# Patient Record
Sex: FEMALE | Race: WHITE | NOT HISPANIC OR LATINO | Employment: OTHER | URBAN - METROPOLITAN AREA
[De-identification: names, ages, dates, MRNs, and addresses within clinical notes are randomized per-mention and may not be internally consistent; named-entity substitution may affect disease eponyms.]

---

## 2023-06-04 DIAGNOSIS — M51.34 OTHER INTERVERTEBRAL DISC DEGENERATION, THORACIC REGION: ICD-10-CM

## 2023-06-06 RX ORDER — PREGABALIN 50 MG/1
CAPSULE ORAL
Qty: 60 CAPSULE | Refills: 0 | Status: SHIPPED | OUTPATIENT
Start: 2023-06-06 | End: 2023-07-10

## 2023-07-09 DIAGNOSIS — M51.34 OTHER INTERVERTEBRAL DISC DEGENERATION, THORACIC REGION: ICD-10-CM

## 2023-07-10 RX ORDER — PREGABALIN 50 MG/1
CAPSULE ORAL
Qty: 60 CAPSULE | Refills: 0 | Status: SHIPPED | OUTPATIENT
Start: 2023-07-10 | End: 2023-08-22 | Stop reason: ALTCHOICE

## 2023-08-14 ENCOUNTER — TELEPHONE (OUTPATIENT)
Dept: PRIMARY CARE | Facility: CLINIC | Age: 88
End: 2023-08-14

## 2023-08-14 DIAGNOSIS — M51.34 OTHER INTERVERTEBRAL DISC DEGENERATION, THORACIC REGION: ICD-10-CM

## 2023-08-15 RX ORDER — PREGABALIN 50 MG/1
CAPSULE ORAL
Qty: 60 CAPSULE | Refills: 0 | OUTPATIENT
Start: 2023-08-15

## 2023-08-22 ENCOUNTER — LAB (OUTPATIENT)
Dept: LAB | Facility: LAB | Age: 88
End: 2023-08-22
Payer: MEDICARE

## 2023-08-22 ENCOUNTER — OFFICE VISIT (OUTPATIENT)
Dept: PRIMARY CARE | Facility: CLINIC | Age: 88
End: 2023-08-22
Payer: MEDICARE

## 2023-08-22 VITALS
HEIGHT: 61 IN | WEIGHT: 107.4 LBS | DIASTOLIC BLOOD PRESSURE: 78 MMHG | TEMPERATURE: 96.7 F | BODY MASS INDEX: 20.28 KG/M2 | SYSTOLIC BLOOD PRESSURE: 150 MMHG

## 2023-08-22 DIAGNOSIS — E78.5 DYSLIPIDEMIA: ICD-10-CM

## 2023-08-22 DIAGNOSIS — H90.0 CONDUCTIVE HEARING LOSS, BILATERAL: ICD-10-CM

## 2023-08-22 DIAGNOSIS — I10 BENIGN ESSENTIAL HYPERTENSION: ICD-10-CM

## 2023-08-22 DIAGNOSIS — G20.A1 PARKINSONS DISEASE (MULTI): ICD-10-CM

## 2023-08-22 DIAGNOSIS — E11.51 TYPE 2 DIABETES MELLITUS WITH DIABETIC PERIPHERAL ANGIOPATHY WITHOUT GANGRENE, WITHOUT LONG-TERM CURRENT USE OF INSULIN (MULTI): ICD-10-CM

## 2023-08-22 DIAGNOSIS — E46 PROTEIN-CALORIE MALNUTRITION, UNSPECIFIED SEVERITY (MULTI): ICD-10-CM

## 2023-08-22 DIAGNOSIS — M46.07: Primary | ICD-10-CM

## 2023-08-22 DIAGNOSIS — F33.0 MAJOR DEPRESSIVE DISORDER, RECURRENT, MILD (CMS-HCC): ICD-10-CM

## 2023-08-22 DIAGNOSIS — Z00.00 MEDICARE ANNUAL WELLNESS VISIT, SUBSEQUENT: ICD-10-CM

## 2023-08-22 PROBLEM — M54.6 THORACIC SPINE PAIN: Status: ACTIVE | Noted: 2023-08-22

## 2023-08-22 PROBLEM — K21.9 GASTROESOPHAGEAL REFLUX DISEASE: Status: ACTIVE | Noted: 2023-08-22

## 2023-08-22 PROBLEM — M15.9 GENERALIZED OSTEOARTHRITIS OF MULTIPLE SITES: Status: ACTIVE | Noted: 2023-08-22

## 2023-08-22 PROBLEM — M51.34 DEGENERATIVE DISC DISEASE, THORACIC: Status: ACTIVE | Noted: 2023-08-22

## 2023-08-22 PROBLEM — E11.9 DIABETES MELLITUS (MULTI): Status: ACTIVE | Noted: 2023-08-22

## 2023-08-22 PROBLEM — M54.9 CHRONIC BACK PAIN: Status: ACTIVE | Noted: 2023-08-22

## 2023-08-22 PROBLEM — Z96.651 STATUS POST RIGHT KNEE REPLACEMENT: Status: ACTIVE | Noted: 2023-08-22

## 2023-08-22 PROBLEM — H52.10 MYOPIA: Status: ACTIVE | Noted: 2023-08-22

## 2023-08-22 PROBLEM — M54.16 LUMBAR RADICULOPATHY: Status: ACTIVE | Noted: 2023-08-22

## 2023-08-22 PROBLEM — M17.10 OSTEOARTHRITIS, LOWER LEG, LOCALIZED: Status: ACTIVE | Noted: 2023-08-22

## 2023-08-22 PROBLEM — M81.0 POST-MENOPAUSAL OSTEOPOROSIS: Status: ACTIVE | Noted: 2023-08-22

## 2023-08-22 PROBLEM — M62.830 LUMBAR PARASPINAL MUSCLE SPASM: Status: ACTIVE | Noted: 2023-08-22

## 2023-08-22 PROBLEM — H40.1190 CHRONIC OPEN ANGLE GLAUCOMA: Status: ACTIVE | Noted: 2023-08-22

## 2023-08-22 PROBLEM — H90.3 BILATERAL SENSORINEURAL HEARING LOSS: Status: ACTIVE | Noted: 2023-08-22

## 2023-08-22 PROBLEM — G89.29 CHRONIC BACK PAIN: Status: ACTIVE | Noted: 2023-08-22

## 2023-08-22 PROBLEM — K22.70 BARRETT'S ESOPHAGUS WITHOUT DYSPLASIA: Status: ACTIVE | Noted: 2023-08-22

## 2023-08-22 PROBLEM — M62.81 MUSCLE WEAKNESS: Status: ACTIVE | Noted: 2023-08-22

## 2023-08-22 PROBLEM — H52.209 ASTIGMATISM: Status: ACTIVE | Noted: 2023-08-22

## 2023-08-22 PROBLEM — E11.9 DIABETES MELLITUS (MULTI): Status: RESOLVED | Noted: 2023-08-22 | Resolved: 2023-08-22

## 2023-08-22 PROBLEM — E03.9 HYPOTHYROID: Status: ACTIVE | Noted: 2023-08-22

## 2023-08-22 LAB
ERYTHROCYTE DISTRIBUTION WIDTH (RATIO) BY AUTOMATED COUNT: 12 % (ref 11.5–14.5)
ERYTHROCYTE MEAN CORPUSCULAR HEMOGLOBIN CONCENTRATION (G/DL) BY AUTOMATED: 31.6 G/DL (ref 32–36)
ERYTHROCYTE MEAN CORPUSCULAR VOLUME (FL) BY AUTOMATED COUNT: 97 FL (ref 80–100)
ERYTHROCYTES (10*6/UL) IN BLOOD BY AUTOMATED COUNT: 4.62 X10E12/L (ref 4–5.2)
HEMATOCRIT (%) IN BLOOD BY AUTOMATED COUNT: 44.6 % (ref 36–46)
HEMOGLOBIN (G/DL) IN BLOOD: 14.1 G/DL (ref 12–16)
LEUKOCYTES (10*3/UL) IN BLOOD BY AUTOMATED COUNT: 6 X10E9/L (ref 4.4–11.3)
NRBC (PER 100 WBCS) BY AUTOMATED COUNT: 0 /100 WBC (ref 0–0)
PLATELETS (10*3/UL) IN BLOOD AUTOMATED COUNT: 316 X10E9/L (ref 150–450)

## 2023-08-22 PROCEDURE — 99213 OFFICE O/P EST LOW 20 MIN: CPT | Performed by: INTERNAL MEDICINE

## 2023-08-22 PROCEDURE — 1159F MED LIST DOCD IN RCRD: CPT | Performed by: INTERNAL MEDICINE

## 2023-08-22 PROCEDURE — 1170F FXNL STATUS ASSESSED: CPT | Performed by: INTERNAL MEDICINE

## 2023-08-22 PROCEDURE — 80053 COMPREHEN METABOLIC PANEL: CPT

## 2023-08-22 PROCEDURE — 84443 ASSAY THYROID STIM HORMONE: CPT

## 2023-08-22 PROCEDURE — 1125F AMNT PAIN NOTED PAIN PRSNT: CPT | Performed by: INTERNAL MEDICINE

## 2023-08-22 PROCEDURE — 36415 COLL VENOUS BLD VENIPUNCTURE: CPT

## 2023-08-22 PROCEDURE — 85027 COMPLETE CBC AUTOMATED: CPT

## 2023-08-22 PROCEDURE — 1036F TOBACCO NON-USER: CPT | Performed by: INTERNAL MEDICINE

## 2023-08-22 PROCEDURE — 1160F RVW MEDS BY RX/DR IN RCRD: CPT | Performed by: INTERNAL MEDICINE

## 2023-08-22 PROCEDURE — G0439 PPPS, SUBSEQ VISIT: HCPCS | Performed by: INTERNAL MEDICINE

## 2023-08-22 PROCEDURE — 3077F SYST BP >= 140 MM HG: CPT | Performed by: INTERNAL MEDICINE

## 2023-08-22 PROCEDURE — 3078F DIAST BP <80 MM HG: CPT | Performed by: INTERNAL MEDICINE

## 2023-08-22 RX ORDER — PREGABALIN 150 MG/1
1 CAPSULE ORAL 2 TIMES DAILY
COMMUNITY
Start: 2018-03-07 | End: 2023-08-22 | Stop reason: ALTCHOICE

## 2023-08-22 RX ORDER — OMEPRAZOLE 20 MG/1
1 TABLET, DELAYED RELEASE ORAL DAILY
COMMUNITY
End: 2023-12-01 | Stop reason: WASHOUT

## 2023-08-22 RX ORDER — BRIMONIDINE TARTRATE 2 MG/ML
SOLUTION/ DROPS OPHTHALMIC
COMMUNITY
End: 2023-10-21 | Stop reason: SDUPTHER

## 2023-08-22 RX ORDER — TRIAMTERENE AND HYDROCHLOROTHIAZIDE 37.5; 25 MG/1; MG/1
CAPSULE ORAL
COMMUNITY
End: 2023-08-22 | Stop reason: ALTCHOICE

## 2023-08-22 RX ORDER — VALSARTAN 80 MG/1
80 TABLET ORAL DAILY
Qty: 30 TABLET | Refills: 5 | Status: SHIPPED | OUTPATIENT
Start: 2023-08-22 | End: 2023-09-18

## 2023-08-22 RX ORDER — CARBIDOPA AND LEVODOPA 25; 100 MG/1; MG/1
1 TABLET ORAL 3 TIMES DAILY
COMMUNITY

## 2023-08-22 RX ORDER — LEVOTHYROXINE SODIUM 75 UG/1
TABLET ORAL
COMMUNITY
End: 2023-10-18 | Stop reason: SDUPTHER

## 2023-08-22 RX ORDER — DULOXETIN HYDROCHLORIDE 30 MG/1
1 CAPSULE, DELAYED RELEASE ORAL DAILY
COMMUNITY
Start: 2022-12-27 | End: 2023-08-29 | Stop reason: SDUPTHER

## 2023-08-22 RX ORDER — ATORVASTATIN CALCIUM 40 MG/1
TABLET, FILM COATED ORAL
COMMUNITY
End: 2023-08-22 | Stop reason: ALTCHOICE

## 2023-08-22 RX ORDER — ROSUVASTATIN CALCIUM 40 MG/1
TABLET, COATED ORAL
COMMUNITY
Start: 2021-07-30 | End: 2023-12-01 | Stop reason: WASHOUT

## 2023-08-22 RX ORDER — CHOLECALCIFEROL (VITAMIN D3) 125 MCG
CAPSULE ORAL
COMMUNITY
End: 2023-12-01 | Stop reason: WASHOUT

## 2023-08-22 RX ORDER — DULOXETIN HYDROCHLORIDE 60 MG/1
60 CAPSULE, DELAYED RELEASE ORAL DAILY
COMMUNITY
End: 2023-08-22 | Stop reason: ALTCHOICE

## 2023-08-22 RX ORDER — PREGABALIN 50 MG/1
1 CAPSULE ORAL 2 TIMES DAILY
COMMUNITY
Start: 2022-12-27 | End: 2023-12-11 | Stop reason: ALTCHOICE

## 2023-08-22 RX ORDER — MAGNESIUM HYDROXIDE 400 MG/5ML
SUSPENSION, ORAL (FINAL DOSE FORM) ORAL
COMMUNITY
End: 2023-12-01 | Stop reason: WASHOUT

## 2023-08-22 RX ORDER — GABAPENTIN 100 MG/1
CAPSULE ORAL
COMMUNITY
Start: 2015-11-06 | End: 2023-08-22 | Stop reason: ALTCHOICE

## 2023-08-22 RX ORDER — TRIAMTERENE/HYDROCHLOROTHIAZID 37.5-25 MG
1 TABLET ORAL DAILY
COMMUNITY
End: 2023-08-22 | Stop reason: ALTCHOICE

## 2023-08-22 RX ORDER — HYDROCHLOROTHIAZIDE 25 MG/1
25 TABLET ORAL DAILY
Qty: 30 TABLET | Refills: 5 | Status: SHIPPED | OUTPATIENT
Start: 2023-08-22 | End: 2023-09-18

## 2023-08-22 ASSESSMENT — ENCOUNTER SYMPTOMS
LOSS OF SENSATION IN FEET: 0
SHORTNESS OF BREATH: 0
CHILLS: 0
BLOOD IN STOOL: 0
LIGHT-HEADEDNESS: 0
WEAKNESS: 0
ARTHRALGIAS: 1
FLANK PAIN: 0
NERVOUS/ANXIOUS: 0
APPETITE CHANGE: 0
ABDOMINAL PAIN: 0
DYSURIA: 0
HEADACHES: 0
DECREASED CONCENTRATION: 0
PALPITATIONS: 0
WHEEZING: 0
ACTIVITY CHANGE: 0
DIZZINESS: 0
BACK PAIN: 1
FREQUENCY: 0
SLEEP DISTURBANCE: 0
COUGH: 0
DEPRESSION: 0
UNEXPECTED WEIGHT CHANGE: 0
DIFFICULTY URINATING: 0
CHEST TIGHTNESS: 0
SORE THROAT: 0
OCCASIONAL FEELINGS OF UNSTEADINESS: 0
NECK PAIN: 0

## 2023-08-22 ASSESSMENT — ACTIVITIES OF DAILY LIVING (ADL)
GROCERY_SHOPPING: INDEPENDENT
BATHING: INDEPENDENT
DOING_HOUSEWORK: INDEPENDENT
TAKING_MEDICATION: INDEPENDENT
MANAGING_FINANCES: INDEPENDENT
DRESSING: INDEPENDENT

## 2023-08-22 ASSESSMENT — PATIENT HEALTH QUESTIONNAIRE - PHQ9
SUM OF ALL RESPONSES TO PHQ9 QUESTIONS 1 AND 2: 0
1. LITTLE INTEREST OR PLEASURE IN DOING THINGS: NOT AT ALL
2. FEELING DOWN, DEPRESSED OR HOPELESS: NOT AT ALL

## 2023-08-22 NOTE — PROGRESS NOTES
"Subjective   Patient ID: Lewis Rose is a 88 y.o. female.    HPI patient seen for Medicare wellness visit and follow-up.  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  She is currently seeing pain management and taking tramadol, Lyrica, duloxetine for pain.  She denies she would like to wean off of Lyrica as it is not helping with the pain.  She lives alone in independent living.  She does not use any assistive devices.  She denies any injuries or falls.  Review of Systems   Constitutional:  Negative for activity change, appetite change, chills and unexpected weight change.   HENT:  Negative for congestion, postnasal drip and sore throat.    Eyes:  Negative for visual disturbance.   Respiratory:  Negative for cough, chest tightness, shortness of breath and wheezing.    Cardiovascular:  Negative for chest pain, palpitations and leg swelling.   Gastrointestinal:  Negative for abdominal pain and blood in stool.   Endocrine: Negative for cold intolerance and heat intolerance.   Genitourinary:  Negative for difficulty urinating, dysuria, flank pain and frequency.   Musculoskeletal:  Positive for arthralgias and back pain. Negative for gait problem and neck pain.   Skin:  Negative for rash.   Allergic/Immunologic: Negative for environmental allergies and food allergies.   Neurological:  Negative for dizziness, weakness, light-headedness and headaches.   Psychiatric/Behavioral:  Negative for decreased concentration and sleep disturbance. The patient is not nervous/anxious.        Objective   Visit Vitals  /78 (BP Location: Right arm, Patient Position: Sitting)   Temp 35.9 °C (96.7 °F) (Temporal)   Ht 1.552 m (5' 1.1\")   Wt 48.7 kg (107 lb 6.4 oz)   BMI 20.23 kg/m²   Smoking Status Never   BSA 1.45 m²      Physical Exam  Vitals reviewed.   Constitutional:       General: She is not in acute distress.     Appearance: Normal " appearance.   HENT:      Head: Normocephalic and atraumatic.      Mouth/Throat:      Mouth: Mucous membranes are moist.   Cardiovascular:      Rate and Rhythm: Normal rate and regular rhythm.      Pulses: Normal pulses.   Pulmonary:      Effort: Pulmonary effort is normal. No respiratory distress.      Breath sounds: Normal breath sounds.   Abdominal:      General: Bowel sounds are normal. There is no distension.      Tenderness: There is no abdominal tenderness.   Musculoskeletal:         General: No swelling or tenderness. Normal range of motion.      Cervical back: Normal range of motion.   Skin:     General: Skin is warm.   Neurological:      General: No focal deficit present.      Mental Status: She is alert.      Coordination: Coordination normal.      Gait: Gait normal.   Psychiatric:         Mood and Affect: Mood normal.         Behavior: Behavior normal.         Assessment/Plan   Diagnoses and all orders for this visit:  Spinal enthesopathy, lumbosacral region (CMS/HCC)  Comments:  Chronic low back pain  Continue with tramadol as prescribed by pain management  Continue with duloxetine  Major depressive disorder, recurrent, mild (CMS/HCC)  Comments:  Stable with Cymbalta  Type 2 diabetes mellitus with diabetic peripheral angiopathy without gangrene, without long-term current use of insulin (CMS/[facility])  Comments:  Stable without medications  Benign essential hypertension  Comments:  Blood pressure is elevated  Discontinue triamterene/HCTZ  Start HCTZ and valsartan daily  Orders:  -     CBC; Future  -     Comprehensive Metabolic Panel; Future  -     TSH with reflex to Free T4 if abnormal; Future  -     hydroCHLOROthiazide (HYDRODiuril) 25 mg tablet; Take 1 tablet (25 mg) by mouth once daily.  -     valsartan (Diovan) 80 mg tablet; Take 1 tablet (80 mg) by mouth once daily.  Dyslipidemia  Comments:  Restart rosuvastatin 20 mg once a day  Conductive hearing loss, bilateral  Comments:  Referral to audiology  provided  Orders:  -     Referral to Audiology; Future  Medicare annual wellness visit, subsequent  Comments:  Wellness visit completed  No care gaps found  Parkinsons disease (CMS/[facility])  Comments:  Recently seen by neurology  Protein-calorie malnutrition, unspecified severity (CMS/[facility])  Comments:  Monitor BMI  Follow-up in 2 months for blood pressure check

## 2023-08-22 NOTE — PATIENT INSTRUCTIONS
Wean lyrica off slowly  Blood pressure is high today  STOP taking triamterene/HCTZ  Start hydrochlorothiazide and valsartan 1 tablet each daily  Follow-up in 2 months for blood pressure check

## 2023-08-23 LAB
ALANINE AMINOTRANSFERASE (SGPT) (U/L) IN SER/PLAS: 9 U/L (ref 7–45)
ALBUMIN (G/DL) IN SER/PLAS: 4.4 G/DL (ref 3.4–5)
ALKALINE PHOSPHATASE (U/L) IN SER/PLAS: 95 U/L (ref 33–136)
ANION GAP IN SER/PLAS: 14 MMOL/L (ref 10–20)
ASPARTATE AMINOTRANSFERASE (SGOT) (U/L) IN SER/PLAS: 16 U/L (ref 9–39)
BILIRUBIN TOTAL (MG/DL) IN SER/PLAS: 0.5 MG/DL (ref 0–1.2)
CALCIUM (MG/DL) IN SER/PLAS: 10 MG/DL (ref 8.6–10.6)
CARBON DIOXIDE, TOTAL (MMOL/L) IN SER/PLAS: 28 MMOL/L (ref 21–32)
CHLORIDE (MMOL/L) IN SER/PLAS: 97 MMOL/L (ref 98–107)
CREATININE (MG/DL) IN SER/PLAS: 0.73 MG/DL (ref 0.5–1.05)
GFR FEMALE: 80 ML/MIN/1.73M2
GLUCOSE (MG/DL) IN SER/PLAS: 150 MG/DL (ref 74–99)
POTASSIUM (MMOL/L) IN SER/PLAS: 4.2 MMOL/L (ref 3.5–5.3)
PROTEIN TOTAL: 7.1 G/DL (ref 6.4–8.2)
SODIUM (MMOL/L) IN SER/PLAS: 135 MMOL/L (ref 136–145)
THYROTROPIN (MIU/L) IN SER/PLAS BY DETECTION LIMIT <= 0.05 MIU/L: 1.35 MIU/L (ref 0.44–3.98)
UREA NITROGEN (MG/DL) IN SER/PLAS: 18 MG/DL (ref 6–23)

## 2023-08-29 DIAGNOSIS — F33.0 MAJOR DEPRESSIVE DISORDER, RECURRENT, MILD (CMS-HCC): Primary | ICD-10-CM

## 2023-08-29 RX ORDER — DULOXETIN HYDROCHLORIDE 30 MG/1
30 CAPSULE, DELAYED RELEASE ORAL DAILY
Qty: 90 CAPSULE | Refills: 1 | Status: SHIPPED | OUTPATIENT
Start: 2023-08-29 | End: 2023-10-26 | Stop reason: ALTCHOICE

## 2023-09-07 LAB
6-ACETYLMORPHINE: <25 NG/ML
7-AMINOCLONAZEPAM: <25 NG/ML
ALPHA-HYDROXYALPRAZOLAM: <25 NG/ML
ALPHA-HYDROXYMIDAZOLAM: <25 NG/ML
ALPRAZOLAM: <25 NG/ML
AMPHETAMINE (PRESENCE) IN URINE BY SCREEN METHOD: ABNORMAL
BARBITURATES PRESENCE IN URINE BY SCREEN METHOD: ABNORMAL
CANNABINOIDS IN URINE BY SCREEN METHOD: ABNORMAL
CHLORDIAZEPOXIDE: <25 NG/ML
CLONAZEPAM: <25 NG/ML
COCAINE (PRESENCE) IN URINE BY SCREEN METHOD: ABNORMAL
CODEINE: <50 NG/ML
CREATINE, URINE FOR DRUG: 143.3 MG/DL
DIAZEPAM: <25 NG/ML
DRUG SCREEN COMMENT URINE: ABNORMAL
EDDP: <25 NG/ML
FENTANYL CONFIRMATION, URINE: <2.5 NG/ML
HYDROCODONE: <25 NG/ML
HYDROMORPHONE: <25 NG/ML
LORAZEPAM: <25 NG/ML
METHADONE CONFIRMATION,URINE: <25 NG/ML
MIDAZOLAM: <25 NG/ML
MORPHINE URINE: <50 NG/ML
NORDIAZEPAM: <25 NG/ML
NORFENTANYL: <2.5 NG/ML
NORHYDROCODONE: <25 NG/ML
NOROXYCODONE: <25 NG/ML
O-DESMETHYLTRAMADOL: >1000 NG/ML
OXAZEPAM: <25 NG/ML
OXYCODONE: <25 NG/ML
OXYMORPHONE: <25 NG/ML
PHENCYCLIDINE (PRESENCE) IN URINE BY SCREEN METHOD: ABNORMAL
TEMAZEPAM: <25 NG/ML
TRAMADOL: >1000 NG/ML
ZOLPIDEM METABOLITE (ZCA): <25 NG/ML
ZOLPIDEM: <25 NG/ML

## 2023-09-17 DIAGNOSIS — I10 BENIGN ESSENTIAL HYPERTENSION: ICD-10-CM

## 2023-09-18 RX ORDER — VALSARTAN 80 MG/1
80 TABLET ORAL DAILY
Qty: 30 TABLET | Refills: 5 | Status: SHIPPED | OUTPATIENT
Start: 2023-09-18 | End: 2023-12-22

## 2023-09-18 RX ORDER — HYDROCHLOROTHIAZIDE 25 MG/1
25 TABLET ORAL DAILY
Qty: 90 TABLET | Refills: 1 | Status: SHIPPED | OUTPATIENT
Start: 2023-09-18 | End: 2023-12-22

## 2023-10-18 DIAGNOSIS — E03.8 OTHER SPECIFIED HYPOTHYROIDISM: Primary | ICD-10-CM

## 2023-10-19 PROBLEM — E55.9 VITAMIN D DEFICIENCY, UNSPECIFIED: Status: ACTIVE | Noted: 2018-05-30

## 2023-10-19 PROBLEM — F41.9 ANXIETY DISORDER, UNSPECIFIED: Status: ACTIVE | Noted: 2018-05-30

## 2023-10-19 PROBLEM — Z78.9 DIFFICULTY TAKING MEDICATION: Status: ACTIVE | Noted: 2020-11-12

## 2023-10-19 PROBLEM — Z86.010 HX OF ADENOMATOUS COLONIC POLYPS: Status: ACTIVE | Noted: 2023-10-19

## 2023-10-19 PROBLEM — Z96.652 PRESENCE OF LEFT ARTIFICIAL KNEE JOINT: Status: ACTIVE | Noted: 2018-06-28

## 2023-10-19 PROBLEM — M25.572 ARTHRALGIA OF LEFT FOOT: Status: ACTIVE | Noted: 2022-02-03

## 2023-10-19 PROBLEM — H11.442 CONJUNCTIVAL CYSTS, LEFT EYE: Status: ACTIVE | Noted: 2020-10-07

## 2023-10-19 PROBLEM — H02.883 MEIBOMIAN GLAND DYSFUNCTION (MGD) OF BOTH EYES: Status: ACTIVE | Noted: 2020-10-07

## 2023-10-19 PROBLEM — H04.123 DRY EYES: Status: ACTIVE | Noted: 2020-04-04

## 2023-10-19 PROBLEM — F41.0 PANIC DISORDER WITHOUT AGORAPHOBIA: Status: ACTIVE | Noted: 2019-05-06

## 2023-10-19 PROBLEM — R19.4 CHANGE IN BOWEL HABIT: Status: ACTIVE | Noted: 2018-11-06

## 2023-10-19 PROBLEM — E78.2 MIXED HYPERLIPIDEMIA: Status: ACTIVE | Noted: 2018-06-08

## 2023-10-19 PROBLEM — M71.9 DISORDER OF BURSAE OF SHOULDER REGION: Status: ACTIVE | Noted: 2019-05-06

## 2023-10-19 PROBLEM — G47.30 SLEEP APNEA: Status: ACTIVE | Noted: 2018-05-30

## 2023-10-19 PROBLEM — Z91.148 DIFFICULTY TAKING MEDICATION: Status: ACTIVE | Noted: 2020-11-12

## 2023-10-19 PROBLEM — M54.2 CERVICALGIA: Status: ACTIVE | Noted: 2018-05-31

## 2023-10-19 PROBLEM — R63.4 WEIGHT LOSS, UNINTENTIONAL: Status: ACTIVE | Noted: 2020-11-12

## 2023-10-19 PROBLEM — F32.2 AGITATED DEPRESSION (MULTI): Status: ACTIVE | Noted: 2020-11-05

## 2023-10-19 PROBLEM — Z86.0101 HX OF ADENOMATOUS COLONIC POLYPS: Status: ACTIVE | Noted: 2023-10-19

## 2023-10-19 PROBLEM — E11.9 TYPE 2 DIABETES MELLITUS WITHOUT COMPLICATIONS (MULTI): Status: ACTIVE | Noted: 2018-06-18

## 2023-10-19 PROBLEM — M20.42 ACQUIRED HAMMER TOE OF LEFT FOOT: Status: ACTIVE | Noted: 2022-02-03

## 2023-10-19 PROBLEM — H40.1121 PRIMARY OPEN ANGLE GLAUCOMA OF LEFT EYE, MILD STAGE: Status: ACTIVE | Noted: 2023-10-19

## 2023-10-19 PROBLEM — H02.889 MGD (MEIBOMIAN GLAND DISEASE): Status: ACTIVE | Noted: 2018-05-30

## 2023-10-19 PROBLEM — M47.812 CERVICAL SPONDYLOSIS: Status: ACTIVE | Noted: 2018-05-30

## 2023-10-19 PROBLEM — M54.30 SCIATICA: Status: ACTIVE | Noted: 2018-06-28

## 2023-10-19 PROBLEM — I25.10 CORONARY ARTERY DISEASE: Status: ACTIVE | Noted: 2018-06-28

## 2023-10-19 PROBLEM — H02.886 MEIBOMIAN GLAND DYSFUNCTION (MGD) OF BOTH EYES: Status: ACTIVE | Noted: 2020-10-07

## 2023-10-19 PROBLEM — F32.9 MAJOR DEPRESSIVE DISORDER, SINGLE EPISODE, UNSPECIFIED: Status: ACTIVE | Noted: 2018-06-28

## 2023-10-19 PROBLEM — E74.10 DIETARY FRUCTOSE INTOLERANCE: Status: ACTIVE | Noted: 2018-11-01

## 2023-10-19 PROBLEM — T84.9XXA: Status: ACTIVE | Noted: 2022-02-03

## 2023-10-19 PROBLEM — K62.5 HEMORRHAGE OF RECTUM AND ANUS: Status: ACTIVE | Noted: 2019-05-06

## 2023-10-19 PROBLEM — M17.11 UNILATERAL PRIMARY OSTEOARTHRITIS, RIGHT KNEE: Status: ACTIVE | Noted: 2018-06-18

## 2023-10-19 PROBLEM — K21.00 GASTROESOPHAGEAL REFLUX DISEASE WITH ESOPHAGITIS: Status: ACTIVE | Noted: 2018-10-19

## 2023-10-19 PROBLEM — C43.9 MALIGNANT MELANOMA OF SKIN, UNSPECIFIED (MULTI): Status: ACTIVE | Noted: 2018-06-18

## 2023-10-19 PROBLEM — N18.9 CHRONIC KIDNEY DISEASE: Status: ACTIVE | Noted: 2018-06-18

## 2023-10-19 RX ORDER — UREA 40 %
CREAM (GRAM) TOPICAL 2 TIMES DAILY
COMMUNITY
Start: 2020-06-04 | End: 2023-12-01 | Stop reason: WASHOUT

## 2023-10-19 RX ORDER — TRIAMTERENE/HYDROCHLOROTHIAZID 37.5-25 MG
1 TABLET ORAL DAILY
COMMUNITY
End: 2023-10-26 | Stop reason: ALTCHOICE

## 2023-10-19 RX ORDER — ASPIRIN 81 MG/1
81 TABLET ORAL DAILY
COMMUNITY
Start: 2017-05-03

## 2023-10-19 RX ORDER — LEVOTHYROXINE SODIUM 75 UG/1
TABLET ORAL
Qty: 90 TABLET | Refills: 1 | Status: SHIPPED | OUTPATIENT
Start: 2023-10-19 | End: 2024-03-07 | Stop reason: SDUPTHER

## 2023-10-19 RX ORDER — TRIAMCINOLONE ACETONIDE 1 MG/G
CREAM TOPICAL NIGHTLY
COMMUNITY
Start: 2023-10-02 | End: 2023-12-01 | Stop reason: WASHOUT

## 2023-10-19 RX ORDER — PREGABALIN 150 MG/1
150 CAPSULE ORAL 2 TIMES DAILY
COMMUNITY
End: 2023-12-11 | Stop reason: ALTCHOICE

## 2023-10-19 RX ORDER — TRAMADOL HYDROCHLORIDE 50 MG/1
50 TABLET ORAL 3 TIMES DAILY PRN
COMMUNITY
Start: 2023-10-11 | End: 2023-12-11 | Stop reason: SDUPTHER

## 2023-10-19 RX ORDER — TIMOLOL MALEATE 2.5 MG/ML
1 SOLUTION/ DROPS OPHTHALMIC 2 TIMES DAILY
COMMUNITY
Start: 2023-09-30 | End: 2023-10-26 | Stop reason: ALTCHOICE

## 2023-10-19 RX ORDER — METFORMIN HYDROCHLORIDE 500 MG/1
500 TABLET ORAL
COMMUNITY
Start: 2020-12-06 | End: 2023-10-26 | Stop reason: ALTCHOICE

## 2023-10-19 RX ORDER — CEPHALEXIN 500 MG/1
500 CAPSULE ORAL EVERY 6 HOURS
COMMUNITY
End: 2023-10-26 | Stop reason: ALTCHOICE

## 2023-10-19 RX ORDER — LANCETS
EACH MISCELLANEOUS DAILY
COMMUNITY
End: 2023-12-01 | Stop reason: WASHOUT

## 2023-10-19 RX ORDER — AMMONIUM LACTATE 12 G/100G
CREAM TOPICAL AS NEEDED
COMMUNITY

## 2023-10-20 ENCOUNTER — CLINICAL SUPPORT (OUTPATIENT)
Dept: AUDIOLOGY | Facility: CLINIC | Age: 88
End: 2023-10-20
Payer: MEDICARE

## 2023-10-20 DIAGNOSIS — H90.3 SENSORINEURAL HEARING LOSS, BILATERAL: Primary | ICD-10-CM

## 2023-10-20 PROCEDURE — 92557 COMPREHENSIVE HEARING TEST: CPT

## 2023-10-20 PROCEDURE — 92550 TYMPANOMETRY & REFLEX THRESH: CPT

## 2023-10-20 NOTE — PROGRESS NOTES
ADULT AUDIOLOGY AUDIOMETRIC EVALUATION    Name:  Lewis Rose  :  1935  Age:  88 y.o.  Date of Evaluation:  10/20/2023    HISTORY  Lewis Sarmiento, age 88 years old, was seen today for a hearing assessment and hearing aid check. She arrives today reporting her hearing aids will not stay charged for more than 5 hours.     RECALL  At her appointment on 8/3/2021, she reported that the left hearing aid would not charge. She was encouraged to put the left device in the right device's  slot when she got home and see if it will charge. Today, she reported that she completed this troubleshooting step at home but the left device still would not charge. She elected to have the left device sent in for an in-warranty repair. A listening check on the right device revealed that it was in good working condition. The wax trap was changed on the right hearing aid. The patient's  and right hearing aid were returned to her. Once the left device is back from repair, we will call the patient for her to pick it up at the front window.    Right ear: Audéo M70-RSN: [id number]  Left ear: Audéo M70-RSN: [id number]  M receivers Size 1 with medium vented domes.   Warranty expires 2022      AUDIOLOGIC EVALUATION    OTOSCOPY  Otoscopic inspection revealed clear canals and visualization of the eardrum bilaterally.    IMMITTANCE  Normal tympanograms were obtained bilaterally, consistent with a normal moving eardrums and the likely absence of fluid.    Ipsilateral acoustic reflexes were tested and absent at 500Hz, 1000Hz, 2000Hz, and 4000Hz bilaterally.    AUDIOMETRIC TESTING  Pure tone audiometry conducted via insert headphones from 125 Hz - 8000 Hz with good reliability was consistent with:  Right ear: Mild sloping to moderately severe sensorineural hearing loss   Left ear: normal sloping to moderately-severe sensorineural hearing loss    *Stable since last hearing assessment     SPEECH RECOGNITION TESTING (SRT)  SRT was in  agreement with pure tone averages bilaterally ( Right: 45  dB HL, Left: 55 dB HL).    WORD RECOGNITION SCORE (WRS)  WRS was (96%) in the right ear and (100%) in the left ear using recorded ordered by difficulty NU6 word list.    IMPRESSIONS:  The results of today's assessment after consistent with normal tympanograms, absent acoustic reflexes, and stable hearing bilaterally. The patient was counseled with regard to the findings. She would like to purchase new [employer] hearing aids at a level 50. An order has been started in the blonde color.     RECOMMENDATIONS:  Treatment Plan:.  Follow up with ENT/PCP as recommended.  Annual hearing assessments as recommended. Follow up sooner if patient feels hearing or symptoms have changed.  Contact the clinic with questions or concerns at [phone].     PATIENT EDUCATION:   Discussed results and recommendations with patient.  Questions were addressed and the patient was encouraged to contact our department should concerns arise.      Saul Rojas, CCC-A, [facility]  Licensed Audiologist

## 2023-10-21 DIAGNOSIS — H40.1121 PRIMARY OPEN ANGLE GLAUCOMA OF LEFT EYE, MILD STAGE: ICD-10-CM

## 2023-10-21 DIAGNOSIS — H40.1112 PRIMARY OPEN ANGLE GLAUCOMA OF RIGHT EYE, MODERATE STAGE: ICD-10-CM

## 2023-10-21 DIAGNOSIS — H40.1112 PRIMARY OPEN ANGLE GLAUCOMA OF RIGHT EYE, MODERATE STAGE: Primary | ICD-10-CM

## 2023-10-21 RX ORDER — BRIMONIDINE TARTRATE 2 MG/ML
1 SOLUTION/ DROPS OPHTHALMIC 2 TIMES DAILY
Qty: 30 ML | Refills: 0 | Status: SHIPPED | OUTPATIENT
Start: 2023-10-21 | End: 2024-03-13 | Stop reason: SDUPTHER

## 2023-10-21 RX ORDER — BRIMONIDINE TARTRATE 2 MG/ML
SOLUTION/ DROPS OPHTHALMIC
Qty: 20 ML | Refills: 2 | OUTPATIENT
Start: 2023-10-21

## 2023-10-26 ENCOUNTER — OFFICE VISIT (OUTPATIENT)
Dept: PRIMARY CARE | Facility: CLINIC | Age: 88
End: 2023-10-26
Payer: MEDICARE

## 2023-10-26 VITALS
BODY MASS INDEX: 19.47 KG/M2 | WEIGHT: 103.4 LBS | HEART RATE: 82 BPM | DIASTOLIC BLOOD PRESSURE: 88 MMHG | SYSTOLIC BLOOD PRESSURE: 147 MMHG

## 2023-10-26 DIAGNOSIS — F01.B0 VASCULAR DEMENTIA, MODERATE, WITHOUT BEHAVIORAL DISTURBANCE, PSYCHOTIC DISTURBANCE, MOOD DISTURBANCE, AND ANXIETY (MULTI): Primary | ICD-10-CM

## 2023-10-26 DIAGNOSIS — E78.5 DYSLIPIDEMIA: ICD-10-CM

## 2023-10-26 DIAGNOSIS — E11.51 TYPE 2 DIABETES MELLITUS WITH DIABETIC PERIPHERAL ANGIOPATHY WITHOUT GANGRENE, WITHOUT LONG-TERM CURRENT USE OF INSULIN (MULTI): ICD-10-CM

## 2023-10-26 DIAGNOSIS — M54.40 CHRONIC LOW BACK PAIN WITH SCIATICA, SCIATICA LATERALITY UNSPECIFIED, UNSPECIFIED BACK PAIN LATERALITY: ICD-10-CM

## 2023-10-26 DIAGNOSIS — I10 BENIGN ESSENTIAL HYPERTENSION: ICD-10-CM

## 2023-10-26 DIAGNOSIS — C43.9 MALIGNANT MELANOMA OF SKIN, UNSPECIFIED (MULTI): ICD-10-CM

## 2023-10-26 DIAGNOSIS — G89.29 CHRONIC LOW BACK PAIN WITH SCIATICA, SCIATICA LATERALITY UNSPECIFIED, UNSPECIFIED BACK PAIN LATERALITY: ICD-10-CM

## 2023-10-26 PROCEDURE — 99213 OFFICE O/P EST LOW 20 MIN: CPT | Performed by: INTERNAL MEDICINE

## 2023-10-26 PROCEDURE — 1159F MED LIST DOCD IN RCRD: CPT | Performed by: INTERNAL MEDICINE

## 2023-10-26 PROCEDURE — 3077F SYST BP >= 140 MM HG: CPT | Performed by: INTERNAL MEDICINE

## 2023-10-26 PROCEDURE — 3079F DIAST BP 80-89 MM HG: CPT | Performed by: INTERNAL MEDICINE

## 2023-10-26 PROCEDURE — 1160F RVW MEDS BY RX/DR IN RCRD: CPT | Performed by: INTERNAL MEDICINE

## 2023-10-26 PROCEDURE — 1036F TOBACCO NON-USER: CPT | Performed by: INTERNAL MEDICINE

## 2023-10-26 PROCEDURE — 1126F AMNT PAIN NOTED NONE PRSNT: CPT | Performed by: INTERNAL MEDICINE

## 2023-10-26 ASSESSMENT — ENCOUNTER SYMPTOMS
BACK PAIN: 1
WEAKNESS: 1
FATIGUE: 0
SHORTNESS OF BREATH: 0
CONSTIPATION: 0
CHILLS: 0
ABDOMINAL PAIN: 0
NECK PAIN: 0
FEVER: 0
BLOOD IN STOOL: 0
HEADACHES: 0
MYALGIAS: 0
PALPITATIONS: 0
DIARRHEA: 0
ARTHRALGIAS: 1
COUGH: 0
DIZZINESS: 0

## 2023-10-26 ASSESSMENT — PAIN SCALES - GENERAL: PAINLEVEL: 0-NO PAIN

## 2023-10-26 NOTE — PROGRESS NOTES
Subjective   Patient ID: Lewis Rose is a 88 y.o. female.    HPI patient is seen today for routine follow-up.  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  She is currently seeing pain management and is taking tramadol.  She exercises regularly-walks her dog 3-4 times a day.  She denies any health concerns.  No falls or injury.    Review of Systems   Constitutional:  Negative for chills, fatigue and fever.   Respiratory:  Negative for cough and shortness of breath.    Cardiovascular:  Negative for chest pain, palpitations and leg swelling.   Gastrointestinal:  Negative for abdominal pain, blood in stool, constipation and diarrhea.   Endocrine: Negative for cold intolerance and heat intolerance.   Musculoskeletal:  Positive for arthralgias and back pain. Negative for myalgias and neck pain.   Neurological:  Positive for weakness. Negative for dizziness and headaches.       Objective   Physical Exam  Vitals reviewed.   Constitutional:       General: She is not in acute distress.     Appearance: Normal appearance.   HENT:      Head: Normocephalic and atraumatic.   Cardiovascular:      Rate and Rhythm: Normal rate and regular rhythm.      Pulses: Normal pulses.   Pulmonary:      Effort: Pulmonary effort is normal. No respiratory distress.      Breath sounds: Normal breath sounds.   Abdominal:      General: Bowel sounds are normal. There is no distension.      Tenderness: There is no abdominal tenderness.   Musculoskeletal:         General: No swelling or tenderness. Normal range of motion.      Cervical back: Normal range of motion.   Skin:     General: Skin is warm.   Neurological:      General: No focal deficit present.      Mental Status: She is alert.      Coordination: Coordination normal.      Gait: Gait normal.   Psychiatric:         Mood and Affect: Mood normal.         Behavior: Behavior normal.         Assessment/Plan    Diagnoses and all orders for this visit:  Vascular dementia, moderate, without behavioral disturbance, psychotic disturbance, mood disturbance, and anxiety (CMS/[facility])  Comments:  Stable  Malignant melanoma of skin, unspecified (CMS/[facility])  Comments:  Followed by dermatology  Type 2 diabetes mellitus with diabetic peripheral angiopathy without gangrene, without long-term current use of insulin (CMS/[facility])  Comments:  Stable without medication  Benign essential hypertension  Comments:  Stable  Continue valsartan and hydrochlorothiazide daily  Dyslipidemia  Comments:  Continue with rosuvastatin  Chronic low back pain with sciatica, sciatica laterality unspecified, unspecified back pain laterality  Comments:  Follow-up with pain management  Continue with tramadol as prescribed    Influenza vaccine, pneumonia vaccine and COVID-vaccine-up-to-date  Follow-up in 6  months

## 2023-12-01 ENCOUNTER — APPOINTMENT (OUTPATIENT)
Dept: GERIATRIC MEDICINE | Facility: CLINIC | Age: 88
End: 2023-12-01
Payer: MEDICARE

## 2023-12-01 ENCOUNTER — CLINICAL SUPPORT (OUTPATIENT)
Dept: GERIATRIC MEDICINE | Facility: CLINIC | Age: 88
End: 2023-12-01
Payer: MEDICARE

## 2023-12-01 ENCOUNTER — OFFICE VISIT (OUTPATIENT)
Dept: GERIATRIC MEDICINE | Facility: CLINIC | Age: 88
End: 2023-12-01
Payer: MEDICARE

## 2023-12-01 VITALS
WEIGHT: 104.2 LBS | DIASTOLIC BLOOD PRESSURE: 90 MMHG | SYSTOLIC BLOOD PRESSURE: 170 MMHG | BODY MASS INDEX: 19.62 KG/M2 | HEART RATE: 74 BPM

## 2023-12-01 DIAGNOSIS — I10 PRIMARY HYPERTENSION: Primary | ICD-10-CM

## 2023-12-01 DIAGNOSIS — Z63.6 CAREGIVER BURDEN: ICD-10-CM

## 2023-12-01 DIAGNOSIS — G20.A2 PARKINSON'S DISEASE WITH FLUCTUATING MANIFESTATIONS, UNSPECIFIED WHETHER DYSKINESIA PRESENT (MULTI): ICD-10-CM

## 2023-12-01 DIAGNOSIS — F01.B0 VASCULAR DEMENTIA, MODERATE, WITHOUT BEHAVIORAL DISTURBANCE, PSYCHOTIC DISTURBANCE, MOOD DISTURBANCE, AND ANXIETY (MULTI): ICD-10-CM

## 2023-12-01 PROCEDURE — 1126F AMNT PAIN NOTED NONE PRSNT: CPT | Performed by: NURSE PRACTITIONER

## 2023-12-01 PROCEDURE — 1170F FXNL STATUS ASSESSED: CPT | Performed by: NURSE PRACTITIONER

## 2023-12-01 PROCEDURE — 99205 OFFICE O/P NEW HI 60 MIN: CPT | Performed by: NURSE PRACTITIONER

## 2023-12-01 PROCEDURE — 1159F MED LIST DOCD IN RCRD: CPT | Performed by: NURSE PRACTITIONER

## 2023-12-01 PROCEDURE — G2212 PROLONG OUTPT/OFFICE VIS: HCPCS | Performed by: NURSE PRACTITIONER

## 2023-12-01 PROCEDURE — 3080F DIAST BP >= 90 MM HG: CPT | Performed by: NURSE PRACTITIONER

## 2023-12-01 PROCEDURE — 1160F RVW MEDS BY RX/DR IN RCRD: CPT | Performed by: NURSE PRACTITIONER

## 2023-12-01 PROCEDURE — 1036F TOBACCO NON-USER: CPT | Performed by: NURSE PRACTITIONER

## 2023-12-01 PROCEDURE — 99215 OFFICE O/P EST HI 40 MIN: CPT | Mod: PO | Performed by: NURSE PRACTITIONER

## 2023-12-01 PROCEDURE — 3077F SYST BP >= 140 MM HG: CPT | Performed by: NURSE PRACTITIONER

## 2023-12-01 RX ORDER — ACETAMINOPHEN 500 MG
500 TABLET ORAL EVERY 6 HOURS PRN
COMMUNITY

## 2023-12-01 RX ORDER — DULOXETIN HYDROCHLORIDE 30 MG/1
30 CAPSULE, DELAYED RELEASE ORAL DAILY
COMMUNITY
End: 2023-12-22 | Stop reason: SDUPTHER

## 2023-12-01 RX ORDER — OMEPRAZOLE 20 MG/1
20 TABLET, DELAYED RELEASE ORAL
COMMUNITY

## 2023-12-01 SDOH — SOCIAL STABILITY - SOCIAL INSECURITY: DEPENDENT RELATIVE NEEDING CARE AT HOME: Z63.6

## 2023-12-01 ASSESSMENT — ACTIVITIES OF DAILY LIVING (ADL)
DOING_HOUSEWORK: NEEDS ASSISTANCE
NEEDS_ASSISTANCE_WITH_FOOD: INDEPENDENT
HEARING - LEFT EAR: HEARING AID
FEEDING YOURSELF: INDEPENDENT
PILL_BOX_USED: YES
STILL_DRIVING: YES
TAKING_MEDICATION: NEEDS ASSISTANCE
USING_TRANSPORTATION: INDEPENDENT
PREPARING_MEALS: INDEPENDENT
PATIENT'S MEMORY ADEQUATE TO SAFELY COMPLETE DAILY ACTIVITIES?: YES
TOILETING: INDEPENDENT
USING_TELEPHONE: INDEPENDENT
HEARING - RIGHT EAR: HEARING AID
MANAGING_FINANCES: NEEDS ASSISTANCE
ADEQUATE_TO_COMPLETE_ADL: YES
EATING: INDEPENDENT
GROOMING: INDEPENDENT
GROCERY_SHOPPING: INDEPENDENT
JUDGMENT_ADEQUATE_SAFELY_COMPLETE_DAILY_ACTIVITIES: YES
BATHING: INDEPENDENT
DRESSING YOURSELF: INDEPENDENT
WALKS IN HOME: INDEPENDENT

## 2023-12-01 ASSESSMENT — GERIATRIC DEPRESSION SCALE SHORT VERSION (GDS-SV)
ARE YOU AFRAID THAT SOMETHING BAD IS GOING TO HAPPEN TO YOU: NO
DO YOU OFTEN GET BORED: YES
HAVE YOU DROPPED MANY OF YOUR ACTIVITIES AND INTERESTS?: YES
DO YOU PREFER TO STAY AT HOME, RATHER THAN GOING OUT AND DOING NEW THINGS: NO
DO YOU FEEL THAT YOUR LIFE IS EMPTY: NO
DO YOU FEEL FULL OF ENERGY: NO
DO YOU THINK IT IS WONDERFUL TO BE ALIVE NOW: YES
ARE YOU IN GOOD SPIRITS MOST OF THE TIME: YES
DO YOU FEEL PRETTY WORTHLESS THE WAY YOU ARE NOW: NO
ARE YOU BASICALLY SATISFIED WITH YOUR LIFE: YES
DO YOU THINK THAT MOST PEOPLE ARE BETTER OFF THAN YOU ARE: NO
GDS TOTAL SCORE: 4
DO YOU FEEL HAPPY MOST OF THE TIME: YES
DO YOU FEEL THAT YOUR SITUATION IS HOPELESS: NO
DO YOU OFTEN FEEL HELPLESS: YES
DO YOU FEEL YOU HAVE MORE PROBLEMS WITH MEMORY THAN MOST: NO

## 2023-12-01 ASSESSMENT — MONTREAL COGNITIVE ASSESSMENT (MOCA)
9. REPEAT EACH SENTENCE: 1
4. NAME EACH OF THE THREE ANIMALS SHOWN: 2
5. MEMORY TRIALS: 0
12. MEMORY INDEX SCORE: 3
10. [FLUENCY] NAME WORDS STARTING WITH DESIGNATED LETTER: 0
13. ORIENTATION SUBSCORE: 5
VISUOSPATIAL/EXECUTIVE SUBSCORE: 1
7. [VIGILENCE] TAP WHEN HEARING DESIGNATED LETTER: 1
6. READ LIST OF DIGITS [FORWARD/BACKWARD]: 1
WHAT IS THE TOTAL SCORE (OUT OF 30): 15
8. SERIAL SUBTRACTION OF 7S: 1
11. FOR EACH PAIR OF WORDS, WHAT CATEGORY DO THEY BELONG TO (OUT OF 2): 0
WHAT LEVEL OF EDUCATION WAS ATTAINED: 0

## 2023-12-01 ASSESSMENT — ENCOUNTER SYMPTOMS
BRUISES/BLEEDS EASILY: 1
DEPRESSION: 1
FREQUENCY: 1
DYSPHORIC MOOD: 1
OCCASIONAL FEELINGS OF UNSTEADINESS: 0
MYALGIAS: 1
SLEEP DISTURBANCE: 1
WEAKNESS: 1
APPETITE CHANGE: 1
ARTHRALGIAS: 1
NERVOUS/ANXIOUS: 1
LOSS OF SENSATION IN FEET: 0
TROUBLE SWALLOWING: 1

## 2023-12-01 ASSESSMENT — PAIN SCALES - GENERAL: PAINLEVEL: 0-NO PAIN

## 2023-12-01 NOTE — PROGRESS NOTES
Subjective   Patient ID: Lewis Rose is a 88 y.o. female who presents for .    Identifying Information                              : 1935           Assessment date: 2023    Objective       Social History                           Social History     Socioeconomic History    Marital status:      Spouse name: None    Number of children: None    Years of education: None    Highest education level: None   Occupational History    None   Tobacco Use    Smoking status: Never    Smokeless tobacco: Never   Substance and Sexual Activity    Alcohol use: Not Currently    Drug use: Never    Sexual activity: None   Other Topics Concern    None   Social History Narrative    None     Social Determinants of Health     Financial Resource Strain: Not on file   Food Insecurity: Not on file   Transportation Needs: Not on file   Physical Activity: Not on file   Stress: Not on file   Social Connections: Not on file   Intimate Partner Violence: Not on file   Housing Stability: Not on file        ENCOUNTER SCREENING RESULTS  MOCA Total Score: 15  Geriatric Depression Scale (Short Version) Total: 4    NURSING ASSESSMENT    ADL Screening  Patient's Vision Adequate to Safely Complete Daily Activities: Yes  Patient's Judgment Adequate to Safely Complete Daily Activities: Yes  Patient's Memory Adequate to Safely Complete Daily Activities: Yes  Patient Able to Express Needs/Desires: Yes  Which is your dominant hand?: Right  Dressing: Independent  Grooming: Independent  Feeding: Independent  Bathing: Independent  Toileting: Independent  In/Out Bed: Independent  Walks in Home: Independent  Weakness of Legs: None (hx if bilater knee replacement)  Weakness of Arms/Hands: Right  Hearing - Right Ear: Hearing aid  Hearing - Left Ear: Hearing aid     IADL's  Using Telephone: Independent  Grocery Shopping: Independent  Preparing Meals: Independent  Doing Housework: Needs assistance  Laundry: Independent  Taking Medication: Needs  assistance  Pill Box Used: Yes (but doesn't always use)  Managing Finances: Needs assistance  Using Transportation: Independent  Still Driving: Yes  Eating: Independent  Needs Assistance With Food: Independent (but more difficult to cut food)  Difficulty Chewing or Swallowing: No                 SOCIAL WORK ASSESSMENT    Advance Directives/Legal/Financial     DPOA for healthcare:  DPOA for finances:   Legal guardian:       Living Will:      On any form of disability?  If so, explain:     ?   If so, explain:     Significant financial stressors:     Family History      Parent or sibling with neurodegenerative diagnosis:      Extended family members with relevant health history:     Living Situation      Type of residence:  #floors:     People living in the home:     Does patient feel safe living in their home?      Supportive Relationships (Informal Support)     Spouse/partner information:     Children Information:       Other social supports:    Formal Supports     Engaged community services (Emergency Alert, HHA, MOW, Case Management, Etc.):     Mental Health     Sleep: how many hours at night , sleep aides:       Energy:      Mood:      Affect:      Notable Loss/Grief:      Self-harm/suicidal thoughts, plan:      Interests/Hobbies/Activities/Daily Routine:      History of outpatient psychiatric services:      History of inpatient psychiatric services (hospitalization):      Medications for mental health past or present:      History of addiction services:     Assessment/Plan   Impression:      Plan:

## 2023-12-01 NOTE — PATIENT INSTRUCTIONS
Thank you for allowing me to care for you today. Below is what we discussed and additional information you should be aware of before our next visit. If you have any questions, please don't hesitate to call the office.     High Blood Pressure  Omron arm blood pressure cuff  Order BP cuff through [employer]  Take BP every morning after using the bathroom and before eating and taking medications. Nurse will call a week from today for readings.    Parkinson's Disease  - Follow-up with Dr. Ken Florian, neurologist  - Discuss side effects of taking carbidopa/levodopa and your not being able to take the full dose prescribed to treat your condition.    Recommendation:  Driving evaluation for safety   - Order submitted for Occupational Therapy Driving Evaluation, order included with summary  - Not covered by Medicare  - Cost vary by facility, we suggest you call to ascertain the current fee.     Rehabilitation- Occupational therapy Locations    [facility]  [address] [place], [place] [id number] [phone]    [facility]  [address] [place], [place] [id number] [phone]    [facility]  [address] [place] [address]   [phone]    [facility]  P: [phone], F: [phone]  [address] [place] [address]  Hermann Vazquez, OTR/L, CDRS, Certified  Rehabilitation Specialist    [facility]  [phone] for appointments  Locations:  - [facility] [place] [facility]     [address] [address], [place] [address]  - [facility]     [address] [place] [address]  - [facility]      [address], [place] [address]  - [facility], [place]    [address] [place], [place] [id number] [place] [address] Urgent & Outpatient Care,  [facility]  [address] [place] [address]      General brain health guidelines:  - make sure your medical conditions are well controlled (e.g., high blood pressure, high cholesterol, diabetes, sleep apnea etc)  - do not smoke or chew tobacco  - limit alcohol use to no more than 1 alcoholic beverage per day   - address any sensory deficits (e.g., proper glasses for poor eyesight, hearing aids for hearing loss)  - use a weekly pill box  - eat a heart healthy diet (fruits, vegetables, lean meats, fatty fish, whole grains. Limit processed foods)  - exercise or walk. Gradually increase to the goal of 5 days per week, 30 minutes at a time  - try to get at least 7 hours of quality sleep per night  - keep yourself mentally active daily by reading, playing cards, doing word searches, puzzles, etc.  - challenge your brain with new cognitive tasks (new hobby, crafts, take a class, learn a language)  - stay socially active by being part of a group or organization     brain exercise apps and websites:  - [email or web address]  - [email or web address]  - [email or web address]  - [email or web address]  -[email or web address]

## 2023-12-01 NOTE — PROGRESS NOTES
Subjective   Ms. Rose 88 y.o. year old female is accompanied by: DTR, Karishma Juarez  Consult Requested By:  Patient and family  Reason for consultation or primary concern: New Patient Visit, Establish Care, and Hypertension    OARRS Report: Reviewed: The patient's OARRS report was reviewed and is consistent with the reported medication use.  Kori JOAQUIN Cuba, APRN-CNP  12/01/23 11:30 PM      Lewis Rose is a 88-yo White female who presents today to establish care with daughter, Karishma, present. On intake, pt with elevated BP, 170/90. BP taken again by this provider, 200/94. Patient reports taking medications as prescribed, Valsartan and hydrochlorothiazide. [place] lives [place] at [facility], where she has lived 1 yr this month. She was dx w/ Parkinson's Disease 2/2023, prescribed Sinemet at that time, but reports N/V when taking whole tablet. Reports taking 1/2 tablet BID with food. Patient has noticeable tremor of BUE. Witnessed shuffling gait when she went to restroom. However, she does not use any assistive device. Reports she does not regularly eat foods high in salt, but eats a liberal diet. Does not monitor BP at home. DTR completed intake forms and indicated patient with repetitive dialog, sometimes seems confused, no longer able to use iphone or ipad, now needs help paying bills, definitely having trouble with memory and word retrieval. Scored 7/8 on AD8 Dementia Screening. MoCA score was 15/30.      Hypertension  This is a chronic problem. Episode onset: Unsure. Progression since onset: Unsure. The problem is uncontrolled. Agents associated with hypertension include thyroid hormones. Risk factors for coronary artery disease include dyslipidemia, post-menopausal state and sedentary lifestyle. Past treatments include angiotensin blockers and diuretics. The current treatment provides mild improvement. Compliance problems include psychosocial issues.  Identifiable causes of hypertension include a  thyroid problem.        What matters most: Establishing care with geriatric focused provider. Chronic back pain, Parkinson's diagnosis  Health Goals:    Goals        Blood Pressure < 140/90      Record your blood pressure once per day             Assistive Devices  Visual: glasses Yes   Hearing: hearing Yes Last exam: 10/2023  Dental: Yes Last exam: 9/2023    Sleep: how many hours at night 5 hours before waking to use bathroom at 2:30 am and again at 6 am, sleep aides: no. Usually up for the day at 9 am.    Advanced Directives on file: <no information>    Review of Systems   Constitutional:  Positive for appetite change.   HENT:  Positive for trouble swallowing.    Genitourinary:  Positive for frequency.   Musculoskeletal:  Positive for arthralgias and myalgias.   Neurological:  Positive for weakness.   Hematological:  Bruises/bleeds easily.   Psychiatric/Behavioral:  Positive for dysphoric mood and sleep disturbance. The patient is nervous/anxious.        Objective   /90   Pulse 74 Comment: 97 OX  Wt 47.3 kg (104 lb 3.2 oz)   BMI 19.62 kg/m²     Physical Exam      MoCA: 15/30  GDS:  4 /15    AD8 Dementia Screening Interview (8 points): 7/8  Problems with judgement (e.g, problems making decisions, bad financial decisions, problems with thinking)  Yes, a change- Sometimes seems confused, maybe due to hearing loss  Less interest in hobbies/activities  Yes, a change - Does not participate in any activities @ [facility]  Repeats the same things over and over (questions, stories, or statements)  Yes, a change - Does repeat stories/statements  Trouble learning how to use a tool, appliance, or gadget (e.g., VCR, computer, microwave, remote control)  Yes, a change - Has trouble with using iPhone and iPad  Forgets correct month or year  No, no change  Trouble handling complicate financial affairs (e.g., balancing checkbook, income taxes, paying bills)  Yes, a change - Needs assistance with paying bills, has  taxes done by a professional  Trouble remembering appointments  Yes, a change - Often gets dates wrong or times for appointments  Daily problems with thinking and/or memory  Yes, a change - Definitely has trouble with memory and word retrieval      Shortened Seven-Item Screen for Caregiver [place] (28 points) 12/28  Did not occur=0, Occurred but did not cause distress=1, Mild distress=2, Moderate distress=3, Severe distress=4  I feel so alone - as if I have the world on my shoulders. 0  I have little control over my relative's behavior. 3  I have to do too many jobs/chores (feeding, shopping) that my relative used to do. 2  I am upset that I cannot communicate with my relative. 3  My relative is constantly asking the same questions over and over. 2  I have little control over my relative's illness. 2  I am totally responsible for keeping our household in order. 0    TSH   Date/Time Value Ref Range Status   08/22/2023 04:23 PM 1.35 0.44 - 3.98 mIU/L Final     Comment:      TSH testing is performed using different testing    methodology at [facility] than at other    [facility]. Direct result comparisons should    only be made within the same method.     Vitamin D, 25-Hydroxy   Date/Time Value Ref Range Status   07/01/2022 09:51 AM 36 ng/mL Final     Comment:     .  DEFICIENCY:         < 20   NG/ML  INSUFFICIENCY:      20-29  NG/ML  SUFFICIENCY:         NG/ML    THIS ASSAY ACCURATELY QUANTIFIES THE SUM OF  VITAMIN D3, 25-HYDROXY AND VIT D2,25-HYDROXY.         Chemistry    Lab Results   Component Value Date/Time     (L) 08/22/2023 1623    K 4.2 08/22/2023 1623    CL 97 (L) 08/22/2023 1623    CO2 28 08/22/2023 1623    BUN 18 08/22/2023 1623    CREATININE 0.73 08/22/2023 1623    Lab Results   Component Value Date/Time    CALCIUM 10.0 08/22/2023 1623    ALKPHOS 95 08/22/2023 1623    AST 16 08/22/2023 1623    ALT 9 08/22/2023 1623    BILITOT 0.5 08/22/2023 1623         Lab Results   Component  Value Date    WBC 6.0 08/22/2023    HGB 14.1 08/22/2023    HCT 44.6 08/22/2023    MCV 97 08/22/2023     08/22/2023        Assessment/Plan      Problem List Items Addressed This Visit             ICD-10-CM    Primary hypertension - Primary I10     Omron arm blood pressure cuff  Order BP cuff through [employer]  Take BP every morning after using the bathroom and before eating and taking medications. Nurse will call a week from today for readings.         Parkinsons disease G20.A1     Patient reporting N/V when taking whole tablet. Only taking 1/2 tablet twice a day rather than prescribed 1 tablet TID.  Reiterated the importance of taking medication as prescribed. Patient will not benefit from treatment if she is not receiving the full dose.   Reviewed neurology visit notes with patient and DTR, Karishma  Followed by Ken Florian MD  Last seen 2/1/2023, dx w/ Parkinson's Disease at that time, prescribed carbidopa/levodopa and given directions on how to ramp up dosing.   At that time, instructed to follow-up in 4-6 months. DTR stated family will make sure patient is scheduled a follow-up           Relevant Orders    Referral to Occupational Therapy    Vascular dementia, moderate, without behavioral disturbance, psychotic disturbance, mood disturbance, and anxiety (CMS/HCC) F01.B0     Discussed with DTR having patient switched to a higher acuity level of care at [facility]. [place] individuals must be able to enter facility [place] before being raised in acuity. DTR believes individuals must be residents at least 6 months prior to changing acuity. Patient will be there 1 yr this month.         Relevant Orders    Follow Up In Geriatrics    Referral to Occupational Therapy    Caregiver burden Z63.6     DTR scored 12/28 on Shortened Seven-Item Screen for Caregiver [place].   - Will continue to share care responsibilities with 2 other sisters who all live within 30 minutes of  patient.

## 2023-12-01 NOTE — PROGRESS NOTES
Subjective   Patient ID: Lewis Rose is a 88 y.o. female who presents for geriatric assessment.    Identifying Information                              : 1935           Assessment date: 2023    Objective     Patient accompanied by:  daughterKarishma         History provided by: patient and daughter    CONCERNS IDENTIFIED BY NURSING AND SOCIAL WORK     1. dementia     2. Hx depression/anxiety. Now with some apathy and paranoia     3. Medication complaince      Social History                           Social History     Socioeconomic History    Marital status:      Spouse name: None    Number of children: 4    Years of education: None    Highest education level: Bachelor's degree (e.g., BA, AB, BS)   Occupational History    Occupation:    Tobacco Use    Smoking status: Never    Smokeless tobacco: Never   Substance and Sexual Activity    Alcohol use: Not Currently    Drug use: Never    Sexual activity: None   Other Topics Concern    None   Social History Narrative    None     Social Determinants of Health     Financial Resource Strain: Not on file   Food Insecurity: Not on file   Transportation Needs: Not on file   Physical Activity: Not on file   Stress: Not on file   Social Connections: Not on file   Intimate Partner Violence: Not on file   Housing Stability: Not on file        ENCOUNTER SCREENING RESULTS  MOCA Total Score: 15  Geriatric Depression Scale (Short Version) Total: 4    NURSING ASSESSMENT    ADL Screening  Patient's Vision Adequate to Safely Complete Daily Activities: Yes  Patient's Judgment Adequate to Safely Complete Daily Activities: Yes  Patient's Memory Adequate to Safely Complete Daily Activities: Yes  Patient Able to Express Needs/Desires: Yes  Which is your dominant hand?: Right  Dressing: Independent  Grooming: Independent  Feeding: Independent  Bathing: Independent  Toileting: Independent  In/Out Bed: Independent  Walks in Home: Independent  Weakness of Legs:  None (hx if bilater knee replacement)  Weakness of Arms/Hands: Right  Hearing - Right Ear: Hearing aid  Hearing - Left Ear: Hearing aid     IADL's  Using Telephone: Independent  Grocery Shopping: Independent  Preparing Meals: Independent  Doing Housework: Needs assistance  Laundry: Independent  Taking Medication: Needs assistance  Pill Box Used: Yes (but doesn't always use)  Managing Finances: Needs assistance  Using Transportation: Independent  Still Driving: Yes  Eating: Independent  Needs Assistance With Food: Independent (but more difficult to cut food)  Difficulty Chewing or Swallowing: No                 SOCIAL WORK ASSESSMENT    Advance Directives/Legal/Financial     DPOA for healthcare:  Karishma ellsworth  DPOA for finances:  daughterAllison, is assisting with writing checks.   Legal guardian:  ROSA     Living Will: yes     On any form of disability? no If so, explain:     ? no  If so, explain:     Significant financial stressors: none    Family History      Parent or sibling with neurodegenerative diagnosis: NA     Extended family members with relevant health history:  lost son at age 31, to Hodgkin's disease    Living Situation      Type of residence: Assisted Living #floors: 1     People living in the home: alone with her little dog     Does patient feel safe living in their home? yes     Supportive Relationships (Informal Support)     Spouse/partner information:   of lung CA 20 yrs ago     Children Information:  Lost son to Hodgkin's disease. Has 3 remaining children: Karishma in [place], Allison in [place] and Amber in [place].     Other social supports: 4 grandchildren    Formal Supports     Engaged community services (Emergency Alert, HHA, MOW, Case Management, Etc.):  Moved into [facility] almost a year ago. Gets lunches and dinners and they offer cleaning and activities. Pt still driving, but does not go far and doesn't drive at night. Does not really participate much in  the offered activities and cites her back issues. Also has her own cleaning woman who comes in.    Mental Health     Sleep: how many hours at : about 9 hrs/night. sleep aides: unknown      Energy: rather diminished due to back pain     Mood: within normal limits, but acknowledges some depression around past losses of her , son, father. Daughter cites some suspiciousness and also, rather apathetic.     Affect: depressed     Notable Loss/Grief:  earlier loss of son due to Hodgkin's     Self-harm/suicidal thoughts, plan: None Reported     Interests/Hobbies/Activities/Daily Routine: likes to walk in the halls. Fixes her own breakfast. Reads magazines. Doesn't really participate much in offered activities.     History of outpatient psychiatric services:  Says she and her  had counseling after their son .     History of inpatient psychiatric services (hospitalization): NA     Medications for mental health past or present:  does have meds for mood     History of addiction services: NA    Assessment/Plan   Impression:  Review of testing and offer recommendations and impressions. Pt has significant impairment and daughter indicated that she may not be taking her meds correctly. Is still driving. Likes living at [facility], but is in independent living at present. Daughters are all attentive and involved.    Plan: Review of testing. Recommend 's evaluation, due to glaucoma and cognitive decline. Perhaps pre-packaged meds could improve compliance or increased help at home, to ensure that medications are being taken consistently. May need more assisted living/memory care at some point, to make sure she is doing alright.

## 2023-12-01 NOTE — ASSESSMENT & PLAN NOTE
Omron arm blood pressure cuff  Order BP cuff through [employer]  Take BP every morning after using the bathroom and before eating and taking medications. Nurse will call a week from today for readings.

## 2023-12-02 ASSESSMENT — ENCOUNTER SYMPTOMS: HYPERTENSION: 1

## 2023-12-02 NOTE — ASSESSMENT & PLAN NOTE
Discussed with DTR having patient switched to a higher acuity level of care at [facility]. [place] DTR [place] individuals must be able to enter facility at [place] level before being raised in acuity. DTR believes individuals must be residents at least 6 months prior to changing acuity. Patient will be there 1 yr this month.

## 2023-12-02 NOTE — ASSESSMENT & PLAN NOTE
DTR scored 12/28 on Shortened Seven-Item Screen for Caregiver [place].   - Will continue to share care responsibilities with 2 other sisters who all live within 30 minutes of patient.

## 2023-12-02 NOTE — ASSESSMENT & PLAN NOTE
Patient reporting N/V when taking whole tablet. Only taking 1/2 tablet twice a day rather than prescribed 1 tablet TID.  Reiterated the importance of taking medication as prescribed. Patient will not benefit from treatment if she is not receiving the full dose.   Reviewed neurology visit notes with patient and DTRKarishma  Followed by Ken Florian MD  Last seen 2/1/2023, dx w/ Parkinson's Disease at that time, prescribed carbidopa/levodopa and given directions on how to ramp up dosing.   At that time, instructed to follow-up in 4-6 months. DTR stated family will make sure patient is scheduled a follow-up

## 2023-12-06 DIAGNOSIS — H40.1121 PRIMARY OPEN ANGLE GLAUCOMA OF LEFT EYE, MILD STAGE: Primary | ICD-10-CM

## 2023-12-06 RX ORDER — TIMOLOL MALEATE 2.5 MG/ML
SOLUTION/ DROPS OPHTHALMIC
Qty: 10 ML | Refills: 0 | Status: SHIPPED | OUTPATIENT
Start: 2023-12-06 | End: 2023-12-15

## 2023-12-11 ENCOUNTER — OFFICE VISIT (OUTPATIENT)
Dept: RHEUMATOLOGY | Facility: CLINIC | Age: 88
End: 2023-12-11
Payer: MEDICARE

## 2023-12-11 ENCOUNTER — APPOINTMENT (OUTPATIENT)
Dept: OCCUPATIONAL THERAPY | Facility: CLINIC | Age: 88
End: 2023-12-11
Payer: MEDICARE

## 2023-12-11 VITALS
BODY MASS INDEX: 19.4 KG/M2 | WEIGHT: 103 LBS | DIASTOLIC BLOOD PRESSURE: 86 MMHG | SYSTOLIC BLOOD PRESSURE: 150 MMHG | HEART RATE: 97 BPM

## 2023-12-11 DIAGNOSIS — M15.9 GENERALIZED OSTEOARTHRITIS OF MULTIPLE SITES: Primary | ICD-10-CM

## 2023-12-11 DIAGNOSIS — M25.50 ARTHRALGIA, UNSPECIFIED JOINT: ICD-10-CM

## 2023-12-11 PROCEDURE — 3079F DIAST BP 80-89 MM HG: CPT | Performed by: INTERNAL MEDICINE

## 2023-12-11 PROCEDURE — 1036F TOBACCO NON-USER: CPT | Performed by: INTERNAL MEDICINE

## 2023-12-11 PROCEDURE — 99214 OFFICE O/P EST MOD 30 MIN: CPT | Performed by: INTERNAL MEDICINE

## 2023-12-11 PROCEDURE — 1157F ADVNC CARE PLAN IN RCRD: CPT | Performed by: INTERNAL MEDICINE

## 2023-12-11 PROCEDURE — 1160F RVW MEDS BY RX/DR IN RCRD: CPT | Performed by: INTERNAL MEDICINE

## 2023-12-11 PROCEDURE — 3077F SYST BP >= 140 MM HG: CPT | Performed by: INTERNAL MEDICINE

## 2023-12-11 PROCEDURE — 1159F MED LIST DOCD IN RCRD: CPT | Performed by: INTERNAL MEDICINE

## 2023-12-11 PROCEDURE — 1126F AMNT PAIN NOTED NONE PRSNT: CPT | Performed by: INTERNAL MEDICINE

## 2023-12-11 RX ORDER — TRAMADOL HYDROCHLORIDE 50 MG/1
50 TABLET ORAL 3 TIMES DAILY PRN
Qty: 90 TABLET | Refills: 1 | Status: SHIPPED | OUTPATIENT
Start: 2024-01-11 | End: 2024-02-08 | Stop reason: SDUPTHER

## 2023-12-11 RX ORDER — TRIAMCINOLONE ACETONIDE 1 MG/G
CREAM TOPICAL
COMMUNITY
Start: 2023-12-06

## 2023-12-11 RX ORDER — PREGABALIN 150 MG/1
150 CAPSULE ORAL DAILY
Qty: 90 CAPSULE | Refills: 1 | Status: SHIPPED | OUTPATIENT
Start: 2023-12-11 | End: 2024-03-13 | Stop reason: SDUPTHER

## 2023-12-11 NOTE — PROGRESS NOTES
Subjective   Patient ID: Lewis Rose is a 88 y.o. female who presents for Osteoarthritis and Pain (FUV-Taking Tramadol 50 PRN and Extra Strength Tylenol PRN. Tox screen up to date. CSA due today. Also, would like to discuss Lyrica.).    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      ammonium lactate (Amlactin) 12 % cream Apply topically if needed.      aspirin 81 mg EC tablet Take 1 tablet (81 mg) by mouth once daily.      brimonidine (AlphaGAN) 0.2 % ophthalmic solution Administer 1 drop into both eyes 2 times a day. 30 mL 0    carbidopa-levodopa (Sinemet)  mg tablet Take 1 tablet by mouth 3 times a day.      DULoxetine (Cymbalta) 30 mg DR capsule Take 1 capsule (30 mg) by mouth once daily. Do not crush or chew.      hydroCHLOROthiazide (HYDRODiuril) 25 mg tablet TAKE 1 TABLET BY MOUTH EVERY DAY 90 tablet 1    levothyroxine (Synthroid, Levoxyl) 75 mcg tablet TAKE 1 TABLET BY MOUTH EVERY DAY TAKE 6 DAYS PER WEEK ONLY 90 tablet 1    omeprazole OTC (PriLOSEC OTC) 20 mg EC tablet Take 1 tablet (20 mg) by mouth once daily in the morning. Take before meals. Do not crush, chew, or split.      timolol (Timoptic) 0.25 % ophthalmic solution INSTILL 1 DROP IN BOTH EYES TWICE DAILY 10 mL 0    valsartan (Diovan) 80 mg tablet TAKE 1 TABLET BY MOUTH ONCE DAILY. 30 tablet 5    pregabalin (Lyrica) 150 mg capsule Take 1 capsule (150 mg) by mouth  once daily. 90 capsule 1    [START ON 1/11/2024] traMADol (Ultram) 50 mg tablet Take 1 tablet (50 mg) by mouth 3 times a day as needed for moderate pain (4 - 6). Do not start before January 11, 2024. 90 tablet 1    triamcinolone (Kenalog) 0.1 % cream        No current facility-administered medications for this visit.         has a past medical history of Age-related nuclear cataract, left eye (04/04/2016), Age-related nuclear cataract, right eye (04/04/2016), Anxiety, Arthritis, Conjunctival cysts, left eye (10/02/2015), Cortical age-related cataract, left eye (10/02/2015), Cortical age-related cataract, right eye (10/08/2015), Diabetes mellitus (CMS/[facility]), Disease of thyroid gland, Dry eye syndrome of bilateral lacrimal glands, Encounter for general adult medical examination without abnormal findings (09/26/2017), Encounter for other procedures for purposes other than remedying health state (10/13/2015), Glaucoma, Meibomian gland dysfunction right eye, upper and lower eyelids (08/30/2022), Other conditions influencing health status, Other specified anxiety disorders (05/20/2021), Pain in unspecified hip (07/10/2020), Pain in unspecified hip (07/02/2019), Pain in unspecified knee (06/09/2021), Parkinson's disease, Personal history of malignant melanoma of skin (04/04/2016), Personal history of other diseases of the musculoskeletal system and connective tissue (06/03/2018), Personal history of other diseases of the nervous system and sense organs, Personal history of other diseases of urinary system, Personal history of other drug therapy (08/19/2021), Personal history of other endocrine, nutritional and metabolic disease (05/20/2021), Personal history of other endocrine, nutritional and metabolic disease (04/04/2016), Personal history of other infectious and parasitic diseases (05/05/2017), Personal history of other mental and behavioral disorders (04/04/2016), Persons encountering health services in other specified  circumstances (05/20/2021), Preglaucoma, unspecified, 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   Past Surgical History:   Procedure Laterality Date    APPENDECTOMY  04/04/2016    Appendectomy    BELT ABDOMINOPLASTY  04/04/2016    Abdominoplasty    CATARACT EXTRACTION  04/04/2016    Cataract Surgery    OTHER SURGICAL HISTORY  07/01/2022    Knee replacement    TOTAL THYROIDECTOMY  04/04/2016    Thyroid Surgery Total Thyroidectomy      Social History     Tobacco Use    Smoking status: Never    Smokeless tobacco: Never   Substance Use Topics    Alcohol use: Not Currently    Drug use: Never      family history includes Accidental death in her father; Breast cancer in her mother; CVA in her brother.  Pain Management Panel  More data exists         Latest Ref Rng & Units 8/31/2023 9/7/2022   Pain Management Panel   Amphetamine Screen, Urine NEGATIVE PRESUMPTIVE NEGATIVE  PRESUMPTIVE NEGATIVE    Barbiturate Screen, Urine NEGATIVE PRESUMPTIVE NEGATIVE  PRESUMPTIVE NEGATIVE    Codeine IgE Cutoff <50 ng/mL <50  <50    Hydromorphone Urine Cutoff <25 ng/mL <25  <25    Morphine  Cutoff <50 ng/mL <50  <50         Vitals:    12/11/23 1342   BP: 150/86   Pulse: 97     Lab Results   Component Value Date    TSH 1.35 08/22/2023       PHYSICAL EXAM      NODES   HEART  LUNGS  ABDOMEN   VASCULAR  NEURO   SKIN  JOINTS     PLAN  Diagnoses and all orders for this visit:  Generalized osteoarthritis of multiple sites  -     Drug Screen, Urine With Reflex to Confirmation; Future  -     pregabalin (Lyrica) 150 mg capsule; Take 1 capsule (150 mg) by mouth once daily.  -     traMADol (Ultram) 50 mg tablet; Take 1 tablet (50 mg) by mouth 3 times a day as needed for moderate pain (4 - 6).  Do not start before January 11, 2024.  Arthralgia, unspecified joint  -     Drug Screen, Urine With Reflex to Confirmation; Future    Routine follow-up for her chronic pain which includes generalized osteoarthritis and degenerative disc disease  Her Parkinson's is stable and her meds have been decreased because of potential side effect  Her PCP is no longer able to give her Lyrica so I will prescribe that for her.  She takes 150 mg/day I gave her a 3-month supply and she filled out a pain contract  She will continue with her tramadol her urine tox screen is up-to-date  Her OARRS was reviewed and is okay  I will see her back in 3 months  I wished her all the best with her son-in-law who was in the hospital with advanced colon cancer

## 2023-12-18 ENCOUNTER — APPOINTMENT (OUTPATIENT)
Dept: RHEUMATOLOGY | Facility: CLINIC | Age: 88
End: 2023-12-18
Payer: MEDICARE

## 2023-12-21 ENCOUNTER — DOCUMENTATION (OUTPATIENT)
Dept: OCCUPATIONAL THERAPY | Facility: CLINIC | Age: 88
End: 2023-12-21
Payer: MEDICARE

## 2023-12-21 NOTE — PROGRESS NOTES
Occupational Therapy                 Therapy Communication Note    Patient Name: Lewis Rose  MRN: [id number]  Today's Date: 12/21/2023     Discipline: Occupational Therapy    Missed Visit Reason:      No Show to OT evaluation

## 2023-12-22 DIAGNOSIS — M54.40 CHRONIC LOW BACK PAIN WITH SCIATICA, SCIATICA LATERALITY UNSPECIFIED, UNSPECIFIED BACK PAIN LATERALITY: Primary | ICD-10-CM

## 2023-12-22 DIAGNOSIS — I10 BENIGN ESSENTIAL HYPERTENSION: ICD-10-CM

## 2023-12-22 DIAGNOSIS — G89.29 CHRONIC LOW BACK PAIN WITH SCIATICA, SCIATICA LATERALITY UNSPECIFIED, UNSPECIFIED BACK PAIN LATERALITY: Primary | ICD-10-CM

## 2023-12-22 RX ORDER — DULOXETIN HYDROCHLORIDE 30 MG/1
30 CAPSULE, DELAYED RELEASE ORAL DAILY
Qty: 30 CAPSULE | Refills: 1 | Status: SHIPPED | OUTPATIENT
Start: 2023-12-22 | End: 2024-01-22

## 2023-12-22 RX ORDER — VALSARTAN 80 MG/1
80 TABLET ORAL DAILY
Qty: 90 TABLET | Refills: 1 | Status: SHIPPED | OUTPATIENT
Start: 2023-12-22 | End: 2024-03-07 | Stop reason: SDUPTHER

## 2023-12-22 RX ORDER — HYDROCHLOROTHIAZIDE 25 MG/1
25 TABLET ORAL DAILY
Qty: 90 TABLET | Refills: 1 | Status: SHIPPED | OUTPATIENT
Start: 2023-12-22 | End: 2024-03-07 | Stop reason: SDUPTHER

## 2024-01-18 ENCOUNTER — CLINICAL SUPPORT (OUTPATIENT)
Dept: AUDIOLOGY | Facility: CLINIC | Age: 89
End: 2024-01-18

## 2024-01-18 DIAGNOSIS — H90.3 SENSORINEURAL HEARING LOSS (SNHL) OF BOTH EARS: Primary | ICD-10-CM

## 2024-01-18 PROCEDURE — V5110 HEARING AID DISPENSING FEE: HCPCS | Mod: AUDSP | Performed by: AUDIOLOGIST

## 2024-01-18 PROCEDURE — V5261 HEARING AID, DIGIT, BIN, BTE: HCPCS | Mod: AUDSP

## 2024-01-18 NOTE — PROGRESS NOTES
HEARING AID FITTING  Lewis Sarmiento, age 88 years old, was seen today for a hearing aid fitting.    RECALL  At her appointment on 8/3/2021, she reported that the left hearing aid would not charge. She was encouraged to put the left device in the right device's  slot when she got home and see if it will charge. Today, she reported that she completed this troubleshooting step at home but the left device still would not charge. She elected to have the left device sent in for an in-warranty repair. A listening check on the right device revealed that it was in good working condition. The wax trap was changed on the right hearing aid. The patient's  and right hearing aid were returned to her. Once the left device is back from repair, we will call the patient for her to pick it up at the front window.    Right ear: Audéo M70-RSN: [id number]  Left ear: Audéo M70-RSN: [id number]  M receivers Size 1 with medium vented domes.   Warranty expires 11/12/2022    HEARING AIDS  Phonak Audeo L50-RT   Right: [id number] / 0s  with small power dome and cerustop  Left: [id number] / 0s  with small power dome and cerustop  Fit date: 1/18/2024  Warranty: 3/19/2027    Collected payment in full today.    Otoscopic inspection indicated clear ear canals and visualization of the tympanic membrane bilaterally.   Aids were set to a target gain of 90 %  Fitting formula: NAL NL2  Feedback manager wasn't run today.     SUMMARY OF PATIENT EDUCATION  The hearing aids are a good physical fit  Instructed on care and use of the rechargeable battery system   Instructed on care and use of the hearing aids   Written manual and user guide provided  Practiced insertion and removal of hearing aids  Warranty information, loss and damage protocol, and the trial period were explained  Out-of-pocket costs for ongoing maintenance and programming of the aids after the initial 90 day period was reviewed  Purchase agreement was signed and dated  by: Lewis  Paired hearing aids to phone   Follow up appointment scheduled on: 2/22/2024      Completed by:  Saul Corral, CCC-A, [facility]  Licensed Audiologist

## 2024-01-19 DIAGNOSIS — M54.40 CHRONIC LOW BACK PAIN WITH SCIATICA, SCIATICA LATERALITY UNSPECIFIED, UNSPECIFIED BACK PAIN LATERALITY: ICD-10-CM

## 2024-01-19 DIAGNOSIS — G89.29 CHRONIC LOW BACK PAIN WITH SCIATICA, SCIATICA LATERALITY UNSPECIFIED, UNSPECIFIED BACK PAIN LATERALITY: ICD-10-CM

## 2024-01-22 DIAGNOSIS — H40.1121 PRIMARY OPEN ANGLE GLAUCOMA OF LEFT EYE, MILD STAGE: ICD-10-CM

## 2024-01-22 DIAGNOSIS — H40.1112 PRIMARY OPEN ANGLE GLAUCOMA OF RIGHT EYE, MODERATE STAGE: ICD-10-CM

## 2024-01-22 RX ORDER — DULOXETIN HYDROCHLORIDE 30 MG/1
30 CAPSULE, DELAYED RELEASE ORAL DAILY
Qty: 90 CAPSULE | Refills: 1 | Status: SHIPPED | OUTPATIENT
Start: 2024-01-22

## 2024-01-22 RX ORDER — BRIMONIDINE TARTRATE 2 MG/ML
SOLUTION/ DROPS OPHTHALMIC
Qty: 30 ML | Refills: 1 | OUTPATIENT
Start: 2024-01-22

## 2024-01-29 ASSESSMENT — CUP TO DISC RATIO
OD_RATIO: .65
OS_RATIO: .5

## 2024-01-29 ASSESSMENT — SLIT LAMP EXAM - LIDS
COMMENTS: GOOD POSITION, MILD MGD
COMMENTS: GOOD POSITION, MILD MGD

## 2024-01-29 ASSESSMENT — EXTERNAL EXAM - LEFT EYE: OS_EXAM: NORMAL

## 2024-01-29 ASSESSMENT — EXTERNAL EXAM - RIGHT EYE: OD_EXAM: NORMAL

## 2024-01-29 NOTE — PROGRESS NOTES
Primary open angle glaucoma of right eye, moderate [id number]  Primary open angle glaucoma of left eye, mild [id number]  -(+)FH glaucoma - brother  -Pachymetry - 500/497  -OCT RNFL (1/30/24) - OD: Thin S/I. OS: Thin ST. 62/79. Mild progression compared to 8/30/22.   -HVF 24-2 (3/21/23) - OD: 5/16FL 24%FP 37%FN. Peripheral depression, but WNL. OS: 3/15FL 6%FP 10%FN. Peripheral depression, but WNL. Unreliable OU. Similar to 9/19/18.   -Plan: Disc appearance stable. Reports good compliance with drops. Patient with mild increase in IOP s/p steroid injection in back on 3/20/21 (dexamethasone, last injection was methylprednisolone 2/20/23). Per patient, she is no longer getting steroid injections.  -Patient was on Timolol 0.25% OU BID. Added Brimonidine 0.2% OU BID (3/21/23). IOP higher than ideal with mild progression on OCT RNFL. Would recommend continuing Brimonidine OU BID. STOP Timolol (1/30/24), and start dorzolamide-timolol (1/30/24) OU BID. Discussed eye drop administration device to help with squeezing bottle due to arthritis. Patient had been putting brimonidine bottle in hot water to make it easier to squeeze, recommended not doing this due to risk of altering medication/contamination, etc. F/u 6-8 weeks for IOP check.     Diabetes veclvshbA16.9  -HbA1c= 5.9 (7/1/22). No diabetic retinopathy seen on last dilated exam. Continue close monitoring of blood glucose, blood pressure, and cholesterol. Plan for annual dilated eye exam.    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 Monitor.     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 Declines distance Rx at this time.

## 2024-01-30 ENCOUNTER — OFFICE VISIT (OUTPATIENT)
Dept: OPHTHALMOLOGY | Facility: CLINIC | Age: 89
End: 2024-01-30
Payer: MEDICARE

## 2024-01-30 DIAGNOSIS — H40.1121 PRIMARY OPEN ANGLE GLAUCOMA OF LEFT EYE, MILD STAGE: ICD-10-CM

## 2024-01-30 DIAGNOSIS — H52.13 MYOPIA OF BOTH EYES: ICD-10-CM

## 2024-01-30 DIAGNOSIS — H40.1112 PRIMARY OPEN ANGLE GLAUCOMA OF RIGHT EYE, MODERATE STAGE: Primary | ICD-10-CM

## 2024-01-30 DIAGNOSIS — H02.889 MEIBOMIAN GLAND DYSFUNCTION: ICD-10-CM

## 2024-01-30 DIAGNOSIS — E11.9 DIABETES MELLITUS WITHOUT COMPLICATION (MULTI): ICD-10-CM

## 2024-01-30 DIAGNOSIS — H52.4 PRESBYOPIA: ICD-10-CM

## 2024-01-30 DIAGNOSIS — Z96.1 PSEUDOPHAKIA: ICD-10-CM

## 2024-01-30 DIAGNOSIS — H52.201 ASTIGMATISM OF RIGHT EYE, UNSPECIFIED TYPE: ICD-10-CM

## 2024-01-30 PROCEDURE — 92133 CPTRZD OPH DX IMG PST SGM ON: CPT | Performed by: OPHTHALMOLOGY

## 2024-01-30 PROCEDURE — 92014 COMPRE OPH EXAM EST PT 1/>: CPT | Performed by: OPHTHALMOLOGY

## 2024-01-30 RX ORDER — DORZOLAMIDE HYDROCHLORIDE AND TIMOLOL MALEATE 20; 5 MG/ML; MG/ML
1 SOLUTION/ DROPS OPHTHALMIC 2 TIMES DAILY
Qty: 30 ML | Refills: 0 | Status: SHIPPED | OUTPATIENT
Start: 2024-01-30 | End: 2024-03-13 | Stop reason: SDUPTHER

## 2024-01-30 ASSESSMENT — REFRACTION_MANIFEST
OS_AXIS: 0.00
OD_SPHERE: -0.75
OS_ADD: +2.50
OS_SPHERE: -0.50
OD_AXIS: 075
OD_CYLINDER: -0.75
OD_ADD: +2.50
OS_CYLINDER: +0.00

## 2024-01-30 ASSESSMENT — TONOMETRY
IOP_METHOD: GOLDMANN APPLANATION
OD_IOP_MMHG: 18
OS_IOP_MMHG: 19
IOP_METHOD: GOLDMANN APPLANATION
OD_IOP_MMHG: 21
OS_IOP_MMHG: 19

## 2024-01-30 ASSESSMENT — REFRACTION_WEARINGRX: SPECS_TYPE: OTC

## 2024-01-30 ASSESSMENT — VISUAL ACUITY
OS_SC: 20/25
OD_SC: 20/40
METHOD: SNELLEN - LINEAR

## 2024-01-30 ASSESSMENT — ENCOUNTER SYMPTOMS: EYES NEGATIVE: 1

## 2024-01-30 NOTE — PATIENT INSTRUCTIONS
STOP Timolol    Start Dorzolamide-Timolol (blue cap) - 1  drop both eyes 2 times a day    Continue Brimonidine (purple cap) - 1 drop both eyes 2 times a day

## 2024-02-08 DIAGNOSIS — M15.9 GENERALIZED OSTEOARTHRITIS OF MULTIPLE SITES: ICD-10-CM

## 2024-02-09 RX ORDER — TRAMADOL HYDROCHLORIDE 50 MG/1
50 TABLET ORAL 3 TIMES DAILY PRN
Qty: 90 TABLET | Refills: 1 | Status: SHIPPED | OUTPATIENT
Start: 2024-02-09

## 2024-02-22 ENCOUNTER — CLINICAL SUPPORT (OUTPATIENT)
Dept: AUDIOLOGY | Facility: CLINIC | Age: 89
End: 2024-02-22

## 2024-02-22 DIAGNOSIS — H90.3 SENSORINEURAL HEARING LOSS, BILATERAL: Primary | ICD-10-CM

## 2024-02-22 NOTE — PROGRESS NOTES
HEARING AID FITTING  Lewis Sarmiento, age 88 years old, was seen today for a one month check of her hearing aids.     RECALL  At her appointment on 8/3/2021, she reported that the left hearing aid would not charge. She was encouraged to put the left device in the right device's  slot when she got home and see if it will charge. Today, she reported that she completed this troubleshooting step at home but the left device still would not charge. She elected to have the left device sent in for an in-warranty repair. A listening check on the right device revealed that it was in good working condition. The wax trap was changed on the right hearing aid. The patient's  and right hearing aid were returned to her. Once the left device is back from repair, we will call the patient for her to pick it up at the front window.    Right ear: Audéo M70-RSN: [id number]  Left ear: Audéo M70-RSN: [id number]  M receivers Size 1 with medium vented domes.   Warranty expires 11/12/2022    HEARING AIDS  Phonak Audeo L50-RT   Right: [id number] / 0s  with small power dome and cerustop  Left: [id number] / 0s  with small power dome and cerustop  Fit date: 1/18/2024  Warranty: 3/19/2027    No charge within 90 days of fitting.     Otoscopic inspection indicated clear ear canals and visualization of the tympanic membrane bilaterally.   Aids were set to a target gain of 90 %  Fitting formula: NAL NL2  Feedback manager wasn't run today.     Function testing using warble tones, SRT, CNC words and [employer] sentences was completed today.     SUMMARY OF PATIENT EDUCATION  The hearing aids are a good physical fit  Instructed on care and use of the rechargeable battery system   Instructed on care and use of the hearing aids   Written manual and user guide provided  Practiced insertion and removal of hearing aids  Warranty information, loss and damage protocol, and the trial period were explained  Out-of-pocket costs for ongoing  maintenance and programming of the aids after the initial 90 day period was reviewed  Purchase agreement was signed and dated by: Lewis Galvan hearing aids to phone   Follow up appointment scheduled on: 2/22/2024      Completed by:  Saul Corral, CCC-A, [facility]  Licensed Audiologist

## 2024-03-07 ENCOUNTER — OFFICE VISIT (OUTPATIENT)
Dept: GERIATRIC MEDICINE | Facility: CLINIC | Age: 89
End: 2024-03-07
Payer: MEDICARE

## 2024-03-07 VITALS
BODY MASS INDEX: 21.13 KG/M2 | WEIGHT: 112.2 LBS | SYSTOLIC BLOOD PRESSURE: 152 MMHG | TEMPERATURE: 96.2 F | RESPIRATION RATE: 16 BRPM | DIASTOLIC BLOOD PRESSURE: 82 MMHG | HEART RATE: 76 BPM

## 2024-03-07 DIAGNOSIS — E03.8 OTHER SPECIFIED HYPOTHYROIDISM: ICD-10-CM

## 2024-03-07 DIAGNOSIS — K22.70 BARRETT'S ESOPHAGUS WITHOUT DYSPLASIA: ICD-10-CM

## 2024-03-07 DIAGNOSIS — E55.9 VITAMIN D DEFICIENCY, UNSPECIFIED: ICD-10-CM

## 2024-03-07 DIAGNOSIS — I10 PRIMARY HYPERTENSION: ICD-10-CM

## 2024-03-07 DIAGNOSIS — Z13.21 ENCOUNTER FOR VITAMIN DEFICIENCY SCREENING: ICD-10-CM

## 2024-03-07 DIAGNOSIS — Z79.899 MEDICATION MANAGEMENT: ICD-10-CM

## 2024-03-07 DIAGNOSIS — I10 BENIGN ESSENTIAL HYPERTENSION: ICD-10-CM

## 2024-03-07 DIAGNOSIS — M15.9 GENERALIZED OSTEOARTHRITIS OF MULTIPLE SITES: ICD-10-CM

## 2024-03-07 DIAGNOSIS — Z01.89 ENCOUNTER FOR GERIATRIC ASSESSMENT: ICD-10-CM

## 2024-03-07 LAB
BASOPHILS # BLD AUTO: 0.03 X10*3/UL (ref 0–0.1)
BASOPHILS NFR BLD AUTO: 0.5 %
EOSINOPHIL # BLD AUTO: 0.06 X10*3/UL (ref 0–0.4)
EOSINOPHIL NFR BLD AUTO: 1 %
ERYTHROCYTE [DISTWIDTH] IN BLOOD BY AUTOMATED COUNT: 11.9 % (ref 11.5–14.5)
HCT VFR BLD AUTO: 38.9 % (ref 36–46)
HGB BLD-MCNC: 12.7 G/DL (ref 12–16)
IMM GRANULOCYTES # BLD AUTO: 0.03 X10*3/UL (ref 0–0.5)
IMM GRANULOCYTES NFR BLD AUTO: 0.5 % (ref 0–0.9)
LYMPHOCYTES # BLD AUTO: 2.1 X10*3/UL (ref 0.8–3)
LYMPHOCYTES NFR BLD AUTO: 35.3 %
MCH RBC QN AUTO: 30.9 PG (ref 26–34)
MCHC RBC AUTO-ENTMCNC: 32.6 G/DL (ref 32–36)
MCV RBC AUTO: 95 FL (ref 80–100)
MONOCYTES # BLD AUTO: 0.45 X10*3/UL (ref 0.05–0.8)
MONOCYTES NFR BLD AUTO: 7.6 %
NEUTROPHILS # BLD AUTO: 3.28 X10*3/UL (ref 1.6–5.5)
NEUTROPHILS NFR BLD AUTO: 55.1 %
NRBC BLD-RTO: 0 /100 WBCS (ref 0–0)
PLATELET # BLD AUTO: 284 X10*3/UL (ref 150–450)
RBC # BLD AUTO: 4.11 X10*6/UL (ref 4–5.2)
WBC # BLD AUTO: 6 X10*3/UL (ref 4.4–11.3)

## 2024-03-07 PROCEDURE — 85025 COMPLETE CBC W/AUTO DIFF WBC: CPT | Performed by: NURSE PRACTITIONER

## 2024-03-07 PROCEDURE — 1036F TOBACCO NON-USER: CPT | Performed by: NURSE PRACTITIONER

## 2024-03-07 PROCEDURE — 99213 OFFICE O/P EST LOW 20 MIN: CPT | Performed by: NURSE PRACTITIONER

## 2024-03-07 PROCEDURE — 36415 COLL VENOUS BLD VENIPUNCTURE: CPT | Performed by: NURSE PRACTITIONER

## 2024-03-07 PROCEDURE — 1160F RVW MEDS BY RX/DR IN RCRD: CPT | Performed by: NURSE PRACTITIONER

## 2024-03-07 PROCEDURE — 1157F ADVNC CARE PLAN IN RCRD: CPT | Performed by: NURSE PRACTITIONER

## 2024-03-07 PROCEDURE — 3079F DIAST BP 80-89 MM HG: CPT | Performed by: NURSE PRACTITIONER

## 2024-03-07 PROCEDURE — 1126F AMNT PAIN NOTED NONE PRSNT: CPT | Performed by: NURSE PRACTITIONER

## 2024-03-07 PROCEDURE — 3077F SYST BP >= 140 MM HG: CPT | Performed by: NURSE PRACTITIONER

## 2024-03-07 PROCEDURE — 1123F ACP DISCUSS/DSCN MKR DOCD: CPT | Performed by: NURSE PRACTITIONER

## 2024-03-07 PROCEDURE — 1159F MED LIST DOCD IN RCRD: CPT | Performed by: NURSE PRACTITIONER

## 2024-03-07 RX ORDER — HYDROCHLOROTHIAZIDE 25 MG/1
25 TABLET ORAL DAILY
Qty: 90 TABLET | Refills: 1 | Status: SHIPPED | OUTPATIENT
Start: 2024-03-07 | End: 2024-05-11 | Stop reason: HOSPADM

## 2024-03-07 RX ORDER — LEVOTHYROXINE SODIUM 75 UG/1
TABLET ORAL
Qty: 90 TABLET | Refills: 1 | Status: SHIPPED | OUTPATIENT
Start: 2024-03-07 | End: 2024-03-18

## 2024-03-07 RX ORDER — VALSARTAN 80 MG/1
80 TABLET ORAL DAILY
Qty: 90 TABLET | Refills: 1 | Status: SHIPPED | OUTPATIENT
Start: 2024-03-07 | End: 2024-05-11 | Stop reason: HOSPADM

## 2024-03-07 ASSESSMENT — ENCOUNTER SYMPTOMS
HYPERTENSION: 1
MYALGIAS: 1
FREQUENCY: 1
SLEEP DISTURBANCE: 1
ARTHRALGIAS: 1
DYSPHORIC MOOD: 1
LOSS OF SENSATION IN FEET: 0
APPETITE CHANGE: 1
BRUISES/BLEEDS EASILY: 1
DEPRESSION: 0
WEAKNESS: 1
NERVOUS/ANXIOUS: 1
TROUBLE SWALLOWING: 1
OCCASIONAL FEELINGS OF UNSTEADINESS: 0

## 2024-03-07 ASSESSMENT — PAIN SCALES - GENERAL: PAINLEVEL: 0-NO PAIN

## 2024-03-07 ASSESSMENT — PATIENT HEALTH QUESTIONNAIRE - PHQ9
2. FEELING DOWN, DEPRESSED OR HOPELESS: NOT AT ALL
SUM OF ALL RESPONSES TO PHQ9 QUESTIONS 1 AND 2: 0
1. LITTLE INTEREST OR PLEASURE IN DOING THINGS: NOT AT ALL

## 2024-03-07 NOTE — ASSESSMENT & PLAN NOTE
"Patient with questions regarding what condition is treated by each medication   Mentioned that \"green pill\" she took previously made her urinate more than current pink pill, believes it may have been furosemide.  Reviewed all medications including brand and generic names, dose, directions, conditions treated.  Refilled   "

## 2024-03-07 NOTE — PROGRESS NOTES
"Subjective   Ms. Rose 89 y.o. year old female is accompanied by: Self  Consult Requested By:  Patient and family  Reason for consultation or primary concern: No chief complaint on file.    OARRS Report: Reviewed: The patient's OARRS report was reviewed and is consistent with the reported medication use.  Kori Cuba, KEMAR-SHANI  03/07/24 12:53 PM      Lewis Rose is an 89 y.o. White female presents today for follow-up. Patient wants to review medications, especially those for treating hypertension and depression. She shared she once suddenly stopped taking pregabalin not realizing she would experience withdrawal systems including hallucinations. She believes she is able to continue manage her medications on her own. She does not believe she is ready for a higher acuity of care. Today she is at visit alone. Drove herself here, admits she \"got a little lost\" but was eventually able to find her way to the office.       Previous Note:  Lewis Rose is a 88-yo White female who presents today to establish care with daughter, Karishma, chan. On intake, pt with elevated BP, 170/90. BP taken again by this provider, 200/94. Patient reports taking medications as prescribed, Valsartan and hydrochlorothiazide. She lives alone in [place] at [facility], where she has lived 1 yr this month. She was dx w/ Parkinson's Disease 2/2023, prescribed Sinemet at that time, but reports N/V when taking whole tablet. Reports taking 1/2 tablet BID with food. Patient has noticeable tremor of BUE. Witnessed shuffling gait when she went to restroom. However, she does not use any assistive device. Reports she does not regularly eat foods high in salt, but eats a liberal diet. Does not monitor BP at home. DTR completed intake forms and indicated patient with repetitive dialog, sometimes seems confused, no longer able to use iphone or ipad, now needs help paying bills, definitely having trouble with memory and word retrieval. Scored 7/8 on " AD8 Dementia Screening. MoCA score was 15/30.      Hypertension  This is a chronic problem. Episode onset: Unsure. Progression since onset: Unsure. The problem is uncontrolled. Agents associated with hypertension include thyroid hormones. Risk factors for coronary artery disease include dyslipidemia, post-menopausal state and sedentary lifestyle. Past treatments include angiotensin blockers and diuretics. The current treatment provides mild improvement. Compliance problems include psychosocial issues.  Identifiable causes of hypertension include a thyroid problem.     What matters most: Establishing care with geriatric focused provider. Chronic back pain, Parkinson's diagnosis    Health Goals:    Goals        Blood Pressure < 140/90      Record your blood pressure once per day           Assistive Devices  Visual: glasses Yes   Hearing: hearing Yes Last exam: 10/2023  Dental: Yes Last exam: 9/2023    Sleep: how many hours at night 5 hours before waking to use bathroom at 2:30 am and again at 6 am, sleep aides: no. Usually up for the day at 9 am.      Review of Systems   Constitutional:  Positive for appetite change.   HENT:  Positive for trouble swallowing.    Genitourinary:  Positive for frequency.   Musculoskeletal:  Positive for arthralgias and myalgias.   Neurological:  Positive for weakness.   Hematological:  Bruises/bleeds easily.   Psychiatric/Behavioral:  Positive for dysphoric mood and sleep disturbance. The patient is nervous/anxious.        Objective   /82   Pulse 76   Temp 35.7 °C (96.2 °F) (Tympanic)   Resp 16   Wt 50.9 kg (112 lb 3.2 oz)   BMI 21.13 kg/m²     Physical Exam  Vitals reviewed.   Constitutional:       General: She is not in acute distress.     Appearance: Normal appearance. She is normal weight.   HENT:      Head: Normocephalic and atraumatic.      Mouth/Throat:      Mouth: Mucous membranes are moist.   Eyes:      Extraocular Movements: Extraocular movements intact.       Conjunctiva/sclera: Conjunctivae normal.   Cardiovascular:      Rate and Rhythm: Regular rhythm.      Heart sounds: Normal heart sounds.   Pulmonary:      Effort: Pulmonary effort is normal.      Breath sounds: Normal breath sounds.   Abdominal:      General: Bowel sounds are normal.      Palpations: Abdomen is soft.   Musculoskeletal:      Cervical back: Normal range of motion.   Skin:     General: Skin is warm and dry.   Neurological:      Mental Status: She is alert. Mental status is at baseline.   Psychiatric:         Behavior: Behavior normal.           AD8 Dementia Screening Interview (8 points): 7/8  Problems with judgement (e.g, problems making decisions, bad financial decisions, problems with thinking)  Yes, a change- Sometimes seems confused, maybe due to hearing loss  Less interest in hobbies/activities  Yes, a change - Does not participate in any activities @ [facility]  Repeats the same things over and over (questions, stories, or statements)  Yes, a change - Does repeat stories/statements  Trouble learning how to use a tool, appliance, or gadget (e.g., VCR, computer, microwave, remote control)  Yes, a change - Has trouble with using iPhone and iPad  Forgets correct month or year  No, no change  Trouble handling complicate financial affairs (e.g., balancing checkbook, income taxes, paying bills)  Yes, a change - Needs assistance with paying bills, has taxes done by a professional  Trouble remembering appointments  Yes, a change - Often gets dates wrong or times for appointments  Daily problems with thinking and/or memory  Yes, a change - Definitely has trouble with memory and word retrieval      Shortened Seven-Item Screen for Caregiver [place] (28 points) 12/28  Did not occur=0, Occurred but did not cause distress=1, Mild distress=2, Moderate distress=3, Severe distress=4  I feel so alone - as if I have the world on my shoulders. 0  I have little control over my relative's behavior. 3  I have to do  too many jobs/chores (feeding, shopping) that my relative used to do. 2  I am upset that I cannot communicate with my relative. 3  My relative is constantly asking the same questions over and over. 2  I have little control over my relative's illness. 2  I am totally responsible for keeping our household in order. 0    TSH   Date/Time Value Ref Range Status   08/22/2023 04:23 PM 1.35 0.44 - 3.98 mIU/L Final     Comment:      TSH testing is performed using different testing    methodology at [facility] than at other    [facility]. Direct result comparisons should    only be made within the same method.     Vitamin D, 25-Hydroxy   Date/Time Value Ref Range Status   07/01/2022 09:51 AM 36 ng/mL Final     Comment:     .  DEFICIENCY:         < 20   NG/ML  INSUFFICIENCY:      20-29  NG/ML  SUFFICIENCY:         NG/ML    THIS ASSAY ACCURATELY QUANTIFIES THE SUM OF  VITAMIN D3, 25-HYDROXY AND VIT D2,25-HYDROXY.         Chemistry    Lab Results   Component Value Date/Time     (L) 08/22/2023 1623    K 4.2 08/22/2023 1623    CL 97 (L) 08/22/2023 1623    CO2 28 08/22/2023 1623    BUN 18 08/22/2023 1623    CREATININE 0.73 08/22/2023 1623    Lab Results   Component Value Date/Time    CALCIUM 10.0 08/22/2023 1623    ALKPHOS 95 08/22/2023 1623    AST 16 08/22/2023 1623    ALT 9 08/22/2023 1623    BILITOT 0.5 08/22/2023 1623         Lab Results   Component Value Date    WBC 6.0 03/07/2024    HGB 12.7 03/07/2024    HCT 38.9 03/07/2024    MCV 95 03/07/2024     03/07/2024        Assessment/Plan      Problem List Items Addressed This Visit             ICD-10-CM    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    Relevant Orders    EGD    Benign essential hypertension I10    Relevant Medications    hydroCHLOROthiazide (HYDRODiuril) 25 mg tablet    valsartan (Diovan) 80 mg tablet    Generalized osteoarthritis of multiple sites M15.9    Relevant Orders    CBC and Auto Differential (Completed)     "Hypothyroid E03.9    Relevant Medications    levothyroxine (Synthroid, Levoxyl) 75 mcg tablet    Vitamin D deficiency, unspecified E55.9    Medication management Z79.899     Patient with questions regarding what condition is treated by each medication   Mentioned that \"green pill\" she took previously made her urinate more than current pink pill, believes it may have been furosemide.  Reviewed all medications including brand and generic names, dose, directions, conditions treated.  Refilled          Encounter for geriatric assessment Z01.89    Relevant Orders    Follow Up In Geriatrics     Other Visit Diagnoses         Codes    Encounter for vitamin deficiency screening     Z13.21          Kori Cuba, APRN-CNP    "

## 2024-03-08 ASSESSMENT — SLIT LAMP EXAM - LIDS
COMMENTS: GOOD POSITION, MILD MGD
COMMENTS: GOOD POSITION, MILD MGD

## 2024-03-08 ASSESSMENT — CUP TO DISC RATIO
OD_RATIO: .65
OS_RATIO: .5

## 2024-03-08 ASSESSMENT — EXTERNAL EXAM - RIGHT EYE: OD_EXAM: NORMAL

## 2024-03-08 ASSESSMENT — EXTERNAL EXAM - LEFT EYE: OS_EXAM: NORMAL

## 2024-03-09 DIAGNOSIS — H40.1121 PRIMARY OPEN ANGLE GLAUCOMA OF LEFT EYE, MILD STAGE: ICD-10-CM

## 2024-03-09 RX ORDER — TIMOLOL MALEATE 2.5 MG/ML
1 SOLUTION/ DROPS OPHTHALMIC 2 TIMES DAILY
Qty: 10 ML | Refills: 0 | OUTPATIENT
Start: 2024-03-09

## 2024-03-10 NOTE — PROGRESS NOTES
Subjective   Patient ID: Lewis Rose is a 88 y.o. female who presents for Osteoarthritis and Pain (FUV-Taking Tramadol 50 PRN and Extra Strength Tylenol PRN. Tox screen up to date. CSA due today. Also, would like to discuss Lyrica.).    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      ammonium lactate (Amlactin) 12 % cream Apply topically if needed.      aspirin 81 mg EC tablet Take 1 tablet (81 mg) by mouth once daily.      brimonidine (AlphaGAN) 0.2 % ophthalmic solution Administer 1 drop into both eyes 2 times a day. 30 mL 0    carbidopa-levodopa (Sinemet)  mg tablet Take 1 tablet by mouth 3 times a day.      DULoxetine (Cymbalta) 30 mg DR capsule Take 1 capsule (30 mg) by mouth once daily. Do not crush or chew.      hydroCHLOROthiazide (HYDRODiuril) 25 mg tablet TAKE 1 TABLET BY MOUTH EVERY DAY 90 tablet 1    levothyroxine (Synthroid, Levoxyl) 75 mcg tablet TAKE 1 TABLET BY MOUTH EVERY DAY TAKE 6 DAYS PER WEEK ONLY 90 tablet 1    omeprazole OTC (PriLOSEC OTC) 20 mg EC tablet Take 1 tablet (20 mg) by mouth once daily in the morning. Take before meals. Do not crush, chew, or split.      timolol (Timoptic) 0.25 % ophthalmic solution INSTILL 1 DROP IN BOTH EYES TWICE DAILY 10 mL 0    valsartan (Diovan) 80 mg tablet TAKE 1 TABLET BY MOUTH ONCE DAILY. 30  tablet 5    pregabalin (Lyrica) 150 mg capsule Take 1 capsule (150 mg) by mouth once daily. 90 capsule 1    [START ON 1/11/2024] traMADol (Ultram) 50 mg tablet Take 1 tablet (50 mg) by mouth 3 times a day as needed for moderate pain (4 - 6). Do not start before January 11, 2024. 90 tablet 1    triamcinolone (Kenalog) 0.1 % cream        No current facility-administered medications for this visit.         has a past medical history of Age-related nuclear cataract, left eye (04/04/2016), Age-related nuclear cataract, right eye (04/04/2016), Anxiety, Arthritis, Conjunctival cysts, left eye (10/02/2015), Cortical age-related cataract, left eye (10/02/2015), Cortical age-related cataract, right eye (10/08/2015), Diabetes mellitus (CMS/[facility]), Disease of thyroid gland, Dry eye syndrome of bilateral lacrimal glands, Encounter for general adult medical examination without abnormal findings (09/26/2017), Encounter for other procedures for purposes other than remedying health state (10/13/2015), Glaucoma, Meibomian gland dysfunction right eye, upper and lower eyelids (08/30/2022), Other conditions influencing health status, Other specified anxiety disorders (05/20/2021), Pain in unspecified hip (07/10/2020), Pain in unspecified hip (07/02/2019), Pain in unspecified knee (06/09/2021), Parkinson's disease, Personal history of malignant melanoma of skin (04/04/2016), Personal history of other diseases of the musculoskeletal system and connective tissue (06/03/2018), Personal history of other diseases of the nervous system and sense organs, Personal history of other diseases of urinary system, Personal history of other drug therapy (08/19/2021), Personal history of other endocrine, nutritional and metabolic disease (05/20/2021), Personal history of other endocrine, nutritional and metabolic disease (04/04/2016), Personal history of other infectious and parasitic diseases (05/05/2017), Personal history of other mental and behavioral  disorders (04/04/2016), Persons encountering health services in other specified circumstances (05/20/2021), Preglaucoma, unspecified, 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   Past Surgical History:   Procedure Laterality Date    APPENDECTOMY  04/04/2016    Appendectomy    BELT ABDOMINOPLASTY  04/04/2016    Abdominoplasty    CATARACT EXTRACTION  04/04/2016    Cataract Surgery    OTHER SURGICAL HISTORY  07/01/2022    Knee replacement    TOTAL THYROIDECTOMY  04/04/2016    Thyroid Surgery Total Thyroidectomy      Social History     Tobacco Use    Smoking status: Never    Smokeless tobacco: Never   Substance Use Topics    Alcohol use: Not Currently    Drug use: Never      family history includes Accidental death in her father; Breast cancer in her mother; CVA in her brother.  Pain Management Panel  More data exists         Latest Ref Rng & Units 8/31/2023 9/7/2022   Pain Management Panel   Amphetamine Screen, Urine NEGATIVE PRESUMPTIVE NEGATIVE  PRESUMPTIVE NEGATIVE    Barbiturate Screen, Urine NEGATIVE PRESUMPTIVE NEGATIVE  PRESUMPTIVE NEGATIVE    Codeine IgE Cutoff <50 ng/mL <50  <50    Hydromorphone Urine Cutoff <25 ng/mL <25  <25    Morphine  Cutoff <50 ng/mL <50  <50         Vitals:    12/11/23 1342   BP: 150/86   Pulse: 97     Lab Results   Component Value Date    TSH 1.35 08/22/2023       PHYSICAL EXAM      NODES   HEART  LUNGS  ABDOMEN   VASCULAR  NEURO   SKIN  JOINTS     PLAN  Diagnoses and all orders for this visit:  Generalized osteoarthritis of multiple sites  -     Drug Screen, Urine With Reflex to Confirmation; Future  -     pregabalin (Lyrica) 150 mg capsule; Take 1 capsule (150 mg) by mouth once daily.  -     traMADol (Ultram) 50 mg tablet;  Take 1 tablet (50 mg) by mouth 3 times a day as needed for moderate pain (4 - 6). Do not start before January 11, 2024.  Arthralgia, unspecified joint  -     Drug Screen, Urine With Reflex to Confirmation; Future    Routine follow-up for her chronic pain which includes generalized osteoarthritis and degenerative disc disease  Her Parkinson's is stable and her meds have been decreased because of potential side effect  Her PCP is no longer able to give her Lyrica so I will prescribe that for her.  She takes 150 mg/day I gave her a 3-month supply and she filled out a pain contract  She will continue with her tramadol her urine tox screen is up-to-date  Her OARRS was reviewed and is okay  I will see her back in 3 months

## 2024-03-11 ENCOUNTER — OFFICE VISIT (OUTPATIENT)
Dept: RHEUMATOLOGY | Facility: CLINIC | Age: 89
End: 2024-03-11
Payer: MEDICARE

## 2024-03-11 VITALS
SYSTOLIC BLOOD PRESSURE: 167 MMHG | DIASTOLIC BLOOD PRESSURE: 95 MMHG | WEIGHT: 110.2 LBS | OXYGEN SATURATION: 97 % | HEART RATE: 78 BPM | BODY MASS INDEX: 20.75 KG/M2

## 2024-03-11 DIAGNOSIS — Z96.651 STATUS POST RIGHT KNEE REPLACEMENT: ICD-10-CM

## 2024-03-11 DIAGNOSIS — M15.9 GENERALIZED OSTEOARTHRITIS OF MULTIPLE SITES: Primary | ICD-10-CM

## 2024-03-11 DIAGNOSIS — G20.A2 PARKINSON'S DISEASE WITH FLUCTUATING MANIFESTATIONS, UNSPECIFIED WHETHER DYSKINESIA PRESENT (MULTI): ICD-10-CM

## 2024-03-11 PROCEDURE — 1126F AMNT PAIN NOTED NONE PRSNT: CPT | Performed by: INTERNAL MEDICINE

## 2024-03-11 PROCEDURE — 99214 OFFICE O/P EST MOD 30 MIN: CPT | Performed by: INTERNAL MEDICINE

## 2024-03-11 PROCEDURE — 3080F DIAST BP >= 90 MM HG: CPT | Performed by: INTERNAL MEDICINE

## 2024-03-11 PROCEDURE — 3077F SYST BP >= 140 MM HG: CPT | Performed by: INTERNAL MEDICINE

## 2024-03-11 PROCEDURE — 1123F ACP DISCUSS/DSCN MKR DOCD: CPT | Performed by: INTERNAL MEDICINE

## 2024-03-11 PROCEDURE — 1159F MED LIST DOCD IN RCRD: CPT | Performed by: INTERNAL MEDICINE

## 2024-03-11 PROCEDURE — 1157F ADVNC CARE PLAN IN RCRD: CPT | Performed by: INTERNAL MEDICINE

## 2024-03-11 PROCEDURE — 1160F RVW MEDS BY RX/DR IN RCRD: CPT | Performed by: INTERNAL MEDICINE

## 2024-03-11 PROCEDURE — 1036F TOBACCO NON-USER: CPT | Performed by: INTERNAL MEDICINE

## 2024-03-13 ENCOUNTER — OFFICE VISIT (OUTPATIENT)
Dept: OPHTHALMOLOGY | Facility: CLINIC | Age: 89
End: 2024-03-13
Payer: MEDICARE

## 2024-03-13 DIAGNOSIS — H40.1112 PRIMARY OPEN ANGLE GLAUCOMA OF RIGHT EYE, MODERATE STAGE: Primary | ICD-10-CM

## 2024-03-13 DIAGNOSIS — M15.9 GENERALIZED OSTEOARTHRITIS OF MULTIPLE SITES: ICD-10-CM

## 2024-03-13 DIAGNOSIS — E11.9 DIABETES MELLITUS WITHOUT COMPLICATION (MULTI): ICD-10-CM

## 2024-03-13 DIAGNOSIS — H52.13 MYOPIA OF BOTH EYES: ICD-10-CM

## 2024-03-13 DIAGNOSIS — Z96.1 PSEUDOPHAKIA: ICD-10-CM

## 2024-03-13 DIAGNOSIS — H52.4 PRESBYOPIA: ICD-10-CM

## 2024-03-13 DIAGNOSIS — H52.201 ASTIGMATISM OF RIGHT EYE, UNSPECIFIED TYPE: ICD-10-CM

## 2024-03-13 DIAGNOSIS — H40.1121 PRIMARY OPEN ANGLE GLAUCOMA OF LEFT EYE, MILD STAGE: ICD-10-CM

## 2024-03-13 DIAGNOSIS — H02.889 MEIBOMIAN GLAND DYSFUNCTION: ICD-10-CM

## 2024-03-13 PROCEDURE — 99213 OFFICE O/P EST LOW 20 MIN: CPT | Performed by: OPHTHALMOLOGY

## 2024-03-13 RX ORDER — BRIMONIDINE TARTRATE 2 MG/ML
1 SOLUTION/ DROPS OPHTHALMIC 2 TIMES DAILY
Qty: 30 ML | Refills: 1 | Status: SHIPPED | OUTPATIENT
Start: 2024-03-13 | End: 2024-03-15

## 2024-03-13 RX ORDER — DORZOLAMIDE HYDROCHLORIDE AND TIMOLOL MALEATE 20; 5 MG/ML; MG/ML
1 SOLUTION/ DROPS OPHTHALMIC 2 TIMES DAILY
Qty: 30 ML | Refills: 1 | Status: SHIPPED | OUTPATIENT
Start: 2024-03-13 | End: 2025-03-13

## 2024-03-13 RX ORDER — PREGABALIN 150 MG/1
150 CAPSULE ORAL DAILY
Qty: 90 CAPSULE | Refills: 1 | Status: SHIPPED | OUTPATIENT
Start: 2024-03-13 | End: 2024-05-11 | Stop reason: HOSPADM

## 2024-03-13 ASSESSMENT — ENCOUNTER SYMPTOMS
RESPIRATORY NEGATIVE: 0
HEMATOLOGIC/LYMPHATIC NEGATIVE: 0
ALLERGIC/IMMUNOLOGIC NEGATIVE: 0
ENDOCRINE NEGATIVE: 0
MUSCULOSKELETAL NEGATIVE: 0
CONSTITUTIONAL NEGATIVE: 0
PSYCHIATRIC NEGATIVE: 0
GASTROINTESTINAL NEGATIVE: 0
EYES NEGATIVE: 1
CARDIOVASCULAR NEGATIVE: 0
NEUROLOGICAL NEGATIVE: 0

## 2024-03-13 ASSESSMENT — TONOMETRY
IOP_METHOD: GOLDMANN APPLANATION
OD_IOP_MMHG: 16
OS_IOP_MMHG: 16

## 2024-03-13 ASSESSMENT — VISUAL ACUITY
METHOD: SNELLEN - LINEAR
OS_SC: 20/25
OD_SC: 20/30

## 2024-03-15 DIAGNOSIS — H40.1112 PRIMARY OPEN ANGLE GLAUCOMA OF RIGHT EYE, MODERATE STAGE: ICD-10-CM

## 2024-03-15 DIAGNOSIS — E03.8 OTHER SPECIFIED HYPOTHYROIDISM: ICD-10-CM

## 2024-03-15 DIAGNOSIS — H40.1121 PRIMARY OPEN ANGLE GLAUCOMA OF LEFT EYE, MILD STAGE: ICD-10-CM

## 2024-03-15 RX ORDER — BRIMONIDINE TARTRATE 2 MG/ML
SOLUTION/ DROPS OPHTHALMIC
Qty: 30 ML | Refills: 1 | Status: SHIPPED | OUTPATIENT
Start: 2024-03-15

## 2024-03-18 RX ORDER — LEVOTHYROXINE SODIUM 75 UG/1
TABLET ORAL
Qty: 90 TABLET | Refills: 1 | Status: SHIPPED | OUTPATIENT
Start: 2024-03-18

## 2024-04-08 ENCOUNTER — APPOINTMENT (OUTPATIENT)
Dept: GASTROENTEROLOGY | Facility: CLINIC | Age: 89
End: 2024-04-08
Payer: MEDICARE

## 2024-04-21 ENCOUNTER — HOSPITAL ENCOUNTER (EMERGENCY)
Facility: HOSPITAL | Age: 89
Discharge: HOME | End: 2024-04-21
Attending: EMERGENCY MEDICINE
Payer: MEDICARE

## 2024-04-21 ENCOUNTER — APPOINTMENT (OUTPATIENT)
Dept: RADIOLOGY | Facility: HOSPITAL | Age: 89
End: 2024-04-21
Payer: MEDICARE

## 2024-04-21 VITALS
HEIGHT: 62 IN | RESPIRATION RATE: 18 BRPM | WEIGHT: 115.3 LBS | TEMPERATURE: 97.4 F | OXYGEN SATURATION: 98 % | DIASTOLIC BLOOD PRESSURE: 98 MMHG | HEART RATE: 84 BPM | BODY MASS INDEX: 21.22 KG/M2 | SYSTOLIC BLOOD PRESSURE: 165 MMHG

## 2024-04-21 DIAGNOSIS — R09.89 HEMODYNAMIC INSTABILITY: ICD-10-CM

## 2024-04-21 DIAGNOSIS — G89.29 CHRONIC LOW BACK PAIN WITHOUT SCIATICA, UNSPECIFIED BACK PAIN LATERALITY: Primary | ICD-10-CM

## 2024-04-21 DIAGNOSIS — M54.50 CHRONIC LOW BACK PAIN WITHOUT SCIATICA, UNSPECIFIED BACK PAIN LATERALITY: Primary | ICD-10-CM

## 2024-04-21 DIAGNOSIS — K57.90 DIVERTICULOSIS: ICD-10-CM

## 2024-04-21 LAB
ALBUMIN SERPL BCP-MCNC: 4.3 G/DL (ref 3.4–5)
ALP SERPL-CCNC: 86 U/L (ref 33–136)
ALT SERPL W P-5'-P-CCNC: 14 U/L (ref 7–45)
ANION GAP SERPL CALC-SCNC: 20 MMOL/L (ref 10–20)
APAP SERPL-MCNC: <10 UG/ML
AST SERPL W P-5'-P-CCNC: 17 U/L (ref 9–39)
BASOPHILS # BLD AUTO: 0.04 X10*3/UL (ref 0–0.1)
BASOPHILS NFR BLD AUTO: 0.4 %
BILIRUB SERPL-MCNC: 0.9 MG/DL (ref 0–1.2)
BUN SERPL-MCNC: 22 MG/DL (ref 6–23)
CALCIUM SERPL-MCNC: 9.3 MG/DL (ref 8.6–10.3)
CARDIAC TROPONIN I PNL SERPL HS: 16 NG/L (ref 0–13)
CARDIAC TROPONIN I PNL SERPL HS: 22 NG/L (ref 0–13)
CHLORIDE SERPL-SCNC: 92 MMOL/L (ref 98–107)
CO2 SERPL-SCNC: 22 MMOL/L (ref 21–32)
CREAT SERPL-MCNC: 0.81 MG/DL (ref 0.5–1.05)
EGFRCR SERPLBLD CKD-EPI 2021: 70 ML/MIN/1.73M*2
EOSINOPHIL # BLD AUTO: 0.02 X10*3/UL (ref 0–0.4)
EOSINOPHIL NFR BLD AUTO: 0.2 %
ERYTHROCYTE [DISTWIDTH] IN BLOOD BY AUTOMATED COUNT: 11.9 % (ref 11.5–14.5)
ETHANOL SERPL-MCNC: <10 MG/DL
FLUAV RNA RESP QL NAA+PROBE: NOT DETECTED
FLUBV RNA RESP QL NAA+PROBE: NOT DETECTED
GLUCOSE SERPL-MCNC: 123 MG/DL (ref 74–99)
HCT VFR BLD AUTO: 41.3 % (ref 36–46)
HGB BLD-MCNC: 14.5 G/DL (ref 12–16)
IMM GRANULOCYTES # BLD AUTO: 0.03 X10*3/UL (ref 0–0.5)
IMM GRANULOCYTES NFR BLD AUTO: 0.3 % (ref 0–0.9)
LYMPHOCYTES # BLD AUTO: 1.58 X10*3/UL (ref 0.8–3)
LYMPHOCYTES NFR BLD AUTO: 16.5 %
MCH RBC QN AUTO: 30.9 PG (ref 26–34)
MCHC RBC AUTO-ENTMCNC: 35.1 G/DL (ref 32–36)
MCV RBC AUTO: 88 FL (ref 80–100)
MONOCYTES # BLD AUTO: 0.52 X10*3/UL (ref 0.05–0.8)
MONOCYTES NFR BLD AUTO: 5.4 %
NEUTROPHILS # BLD AUTO: 7.38 X10*3/UL (ref 1.6–5.5)
NEUTROPHILS NFR BLD AUTO: 77.2 %
PLATELET # BLD AUTO: 275 X10*3/UL (ref 150–450)
PMV BLD AUTO: 9 FL (ref 7.5–11.5)
POTASSIUM SERPL-SCNC: 3.6 MMOL/L (ref 3.5–5.3)
PROT SERPL-MCNC: 7 G/DL (ref 6.4–8.2)
RBC # BLD AUTO: 4.7 X10*6/UL (ref 4–5.2)
SALICYLATES SERPL-MCNC: <3 MG/DL
SARS-COV-2 RNA RESP QL NAA+PROBE: NOT DETECTED
SODIUM SERPL-SCNC: 130 MMOL/L (ref 136–145)
WBC # BLD AUTO: 9.6 X10*3/UL (ref 4.4–11.3)

## 2024-04-21 PROCEDURE — 80143 DRUG ASSAY ACETAMINOPHEN: CPT | Performed by: EMERGENCY MEDICINE

## 2024-04-21 PROCEDURE — 72131 CT LUMBAR SPINE W/O DYE: CPT

## 2024-04-21 PROCEDURE — 74177 CT ABD & PELVIS W/CONTRAST: CPT | Mod: FOREIGN READ | Performed by: RADIOLOGY

## 2024-04-21 PROCEDURE — 2500000001 HC RX 250 WO HCPCS SELF ADMINISTERED DRUGS (ALT 637 FOR MEDICARE OP): Performed by: EMERGENCY MEDICINE

## 2024-04-21 PROCEDURE — 74177 CT ABD & PELVIS W/CONTRAST: CPT

## 2024-04-21 PROCEDURE — 87636 SARSCOV2 & INF A&B AMP PRB: CPT | Performed by: EMERGENCY MEDICINE

## 2024-04-21 PROCEDURE — 36415 COLL VENOUS BLD VENIPUNCTURE: CPT | Performed by: EMERGENCY MEDICINE

## 2024-04-21 PROCEDURE — 72131 CT LUMBAR SPINE W/O DYE: CPT | Mod: FOREIGN READ | Performed by: RADIOLOGY

## 2024-04-21 PROCEDURE — 99285 EMERGENCY DEPT VISIT HI MDM: CPT | Mod: 25

## 2024-04-21 PROCEDURE — 85025 COMPLETE CBC W/AUTO DIFF WBC: CPT | Performed by: EMERGENCY MEDICINE

## 2024-04-21 PROCEDURE — 80053 COMPREHEN METABOLIC PANEL: CPT | Performed by: EMERGENCY MEDICINE

## 2024-04-21 PROCEDURE — 71260 CT THORAX DX C+: CPT | Mod: FOREIGN READ | Performed by: RADIOLOGY

## 2024-04-21 PROCEDURE — 84484 ASSAY OF TROPONIN QUANT: CPT | Performed by: EMERGENCY MEDICINE

## 2024-04-21 PROCEDURE — 2550000001 HC RX 255 CONTRASTS: Performed by: EMERGENCY MEDICINE

## 2024-04-21 RX ORDER — TRAMADOL HYDROCHLORIDE 50 MG/1
50 TABLET ORAL EVERY 8 HOURS PRN
Status: DISCONTINUED | OUTPATIENT
Start: 2024-04-21 | End: 2024-04-21 | Stop reason: HOSPADM

## 2024-04-21 RX ORDER — TRAMADOL HYDROCHLORIDE 50 MG/1
TABLET ORAL
Status: DISPENSED
Start: 2024-04-21 | End: 2024-04-21

## 2024-04-21 RX ADMIN — TRAMADOL HYDROCHLORIDE 50 MG: 50 TABLET, COATED ORAL at 04:05

## 2024-04-21 RX ADMIN — IOHEXOL 75 ML: 350 INJECTION, SOLUTION INTRAVENOUS at 06:32

## 2024-04-21 RX ADMIN — TRAMADOL HYDROCHLORIDE 50 MG: 50 TABLET, COATED ORAL at 11:47

## 2024-04-21 ASSESSMENT — PAIN - FUNCTIONAL ASSESSMENT
PAIN_FUNCTIONAL_ASSESSMENT: 0-10
PAIN_FUNCTIONAL_ASSESSMENT: 0-10

## 2024-04-21 ASSESSMENT — PAIN SCALES - GENERAL
PAINLEVEL_OUTOF10: 7
PAINLEVEL_OUTOF10: 0 - NO PAIN
PAINLEVEL_OUTOF10: 2
PAINLEVEL_OUTOF10: 0 - NO PAIN

## 2024-04-21 ASSESSMENT — COLUMBIA-SUICIDE SEVERITY RATING SCALE - C-SSRS
2. HAVE YOU ACTUALLY HAD ANY THOUGHTS OF KILLING YOURSELF?: NO
6. HAVE YOU EVER DONE ANYTHING, STARTED TO DO ANYTHING, OR PREPARED TO DO ANYTHING TO END YOUR LIFE?: NO
1. IN THE PAST MONTH, HAVE YOU WISHED YOU WERE DEAD OR WISHED YOU COULD GO TO SLEEP AND NOT WAKE UP?: NO

## 2024-04-21 ASSESSMENT — PAIN DESCRIPTION - ORIENTATION: ORIENTATION: LEFT;LOWER;UPPER

## 2024-04-21 ASSESSMENT — PAIN DESCRIPTION - PAIN TYPE: TYPE: ACUTE PAIN

## 2024-04-21 ASSESSMENT — PAIN DESCRIPTION - LOCATION: LOCATION: BACK

## 2024-04-21 ASSESSMENT — PAIN DESCRIPTION - DESCRIPTORS: DESCRIPTORS: THROBBING;STABBING

## 2024-04-21 ASSESSMENT — PAIN DESCRIPTION - FREQUENCY: FREQUENCY: CONSTANT/CONTINUOUS

## 2024-04-21 NOTE — ED NOTES
Spoke to pts daughter Allison who states pt is from [facility] living in [facility] [address] [place] [address] is the correct address.      Julisa Wu RN  04/21/24 1020       Julisa Wu RN  04/21/24 1021

## 2024-04-21 NOTE — ED PROVIDER NOTES
HPI   No chief complaint on file.      HPI  The patient has a history of acute on chronic back pain and her upper lumbar spine.  She denies any new fever, cough, chest pain, shortness of breath, vomiting or diarrhea.  She states previously she had treated herself at home with wine and tramadol.  She states she current lives in assisted living.  She states that her family is concerned that she overdoes it on the opiates and alcohol.  She states she does not get the shakes from alcohol.  She does a history of parkinsonism.                  [place] Coma Scale Score: 14                     Patient History   Past Medical History:   Diagnosis Date    Age-related nuclear cataract, left eye 04/04/2016    Age-related nuclear cataract of left eye    Age-related nuclear cataract, right eye 04/04/2016    Age-related nuclear cataract of right eye    Anxiety     Arthritis     Conjunctival cysts, left eye 10/02/2015    Conjunctival cyst of left eye    Cortical age-related cataract, left eye 10/02/2015    Cortical age-related cataract of left eye    Cortical age-related cataract, right eye 10/08/2015    Cortical age-related cataract of right eye    Diabetes mellitus (Multi)     Disease of thyroid gland     Dry eye syndrome of bilateral lacrimal glands     Dry eyes    Encounter for general adult medical examination without abnormal findings 09/26/2017    Preventative health care    Encounter for other procedures for purposes other than remedying health state 10/13/2015    Prophylactic measure    Glaucoma     Meibomian gland dysfunction right eye, upper and lower eyelids 08/30/2022    Meibomian gland dysfunction (MGD) of upper and lower lids of both eyes    Other conditions influencing health status     Denial Of Any Significant Medical History    Other specified anxiety disorders 05/20/2021    Depression with anxiety    Pain in unspecified hip 07/10/2020    Painful hip    Pain in unspecified hip 07/02/2019    Painful hip    Pain in  unspecified knee 06/09/2021    Knee pain    Parkinson's disease (Multi)     Personal history of malignant melanoma of skin 04/04/2016    History of malignant melanoma    Personal history of other diseases of the musculoskeletal system and connective tissue 06/03/2018    History of neck pain    Personal history of other diseases of the nervous system and sense organs     History of cataract    Personal history of other diseases of urinary system     History of hematuria    Personal history of other drug therapy 08/19/2021    History of drug therapy    Personal history of other endocrine, nutritional and metabolic disease 05/20/2021    History of elevated lipids    Personal history of other endocrine, nutritional and metabolic disease 04/04/2016    History of diabetes mellitus    Personal history of other infectious and parasitic diseases 05/05/2017    History of candidiasis of mouth    Personal history of other mental and behavioral disorders 04/04/2016    History of anxiety    Persons encountering health services in other specified circumstances 05/20/2021    Encounter to establish care with new doctor    Preglaucoma, unspecified, bilateral 07/30/2015    Glaucoma suspect of both eyes    Preglaucoma, unspecified, bilateral 04/04/2016    Glaucoma suspect of both eyes    Preglaucoma, unspecified, bilateral 08/03/2015    Glaucoma suspect of both eyes    Presence of intraocular lens 05/07/2018    Pseudophakia of left eye    Unspecified blepharitis left eye, unspecified eyelid 04/04/2016    Blepharitis of left eye    Unspecified blepharitis right eye, unspecified eyelid 04/04/2016    Blepharitis of right eye     Past Surgical History:   Procedure Laterality Date    APPENDECTOMY  04/04/2016    Appendectomy    BELT ABDOMINOPLASTY  04/04/2016    Abdominoplasty    CATARACT EXTRACTION  04/04/2016    Cataract Surgery    OTHER SURGICAL HISTORY  07/01/2022    Knee replacement    TOTAL THYROIDECTOMY  04/04/2016    Thyroid Surgery  Total Thyroidectomy     Family History   Problem Relation Name Age of Onset    Breast cancer Mother      Accidental death Father      Other (CVA) Brother       Social History     Tobacco Use    Smoking status: Never    Smokeless tobacco: Never   Vaping Use    Vaping status: Never Used   Substance Use Topics    Alcohol use: Not Currently    Drug use: Never       Physical Exam   ED Triage Vitals [04/21/24 0245]   Temperature Heart Rate Respirations BP   36.3 °C (97.4 °F) 94 18 (!) 184/81      Pulse Ox Temp Source Heart Rate Source Patient Position   96 % Oral Monitor Lying      BP Location FiO2 (%)     Right arm --       Physical Exam  Vitals and nursing note reviewed.   Constitutional:       General: She is not in acute distress.     Appearance: She is well-developed.   HENT:      Head: Normocephalic and atraumatic.   Eyes:      Conjunctiva/sclera: Conjunctivae normal.   Cardiovascular:      Rate and Rhythm: Normal rate and regular rhythm.      Heart sounds: No murmur heard.  Pulmonary:      Effort: Pulmonary effort is normal. No respiratory distress.      Breath sounds: Normal breath sounds.   Abdominal:      Palpations: Abdomen is soft.      Tenderness: There is no abdominal tenderness.   Musculoskeletal:         General: No swelling.      Cervical back: Neck supple.   Skin:     General: Skin is warm and dry.      Capillary Refill: Capillary refill takes less than 2 seconds.   Neurological:      Mental Status: She is alert.   Psychiatric:         Mood and Affect: Mood normal.         ED Course & MDM   Diagnoses as of 04/22/24 0600   Chronic low back pain without sciatica, unspecified back pain laterality   Diverticulosis       Medical Decision Making  The patient denies any trauma today.  She is oriented x 3 but appears to be confused somewhat on the timing of her symptoms and illness.  She does have history of hearing deficit.  Did review rheumatology note.  Patient is taking tramadol.  Will give her a dose of  this.  The patient is able to move both legs and sit up.  I have not walked the patient yet but in the absence of any red flags or cauda equina do not think she needs an MRI today.  Will repeat CT scan given she is in his worsening pain but feel this may be her typical chronic pain flaring up today.    Procedure  Procedures     Charles English MD  04/21/24 7919       Charles English MD  04/22/24 0631

## 2024-04-21 NOTE — PROCEDURES
Insert arterial line    Date/Time: 4/21/2024 11:41 AM    Performed by: Augie Alcocer MD  Authorized by: Augie Alcocer MD    Consent:     Consent obtained:  Written    Consent given by:  Patient    Risks, benefits, and alternatives were discussed: yes      Risks discussed:  Bleeding, ischemia, infection and pain  Universal protocol:     Procedure explained and questions answered to patient or proxy's satisfaction: yes      Relevant documents present and verified: yes      Patient identity confirmed:  Arm band and provided demographic data  Pre-procedure details:     Skin preparation:  Chlorhexidine    Preparation: Patient was prepped and draped in sterile fashion    Anesthesia:     Anesthesia method:  Local infiltration    Local anesthetic:  Lidocaine 1% w/o epi  Procedure details:     Location:  R femoral    Placement technique:  Ultrasound guided  Post-procedure details:     Post-procedure:  Sutured and sterile dressing applied    Procedure completion:  Tolerated well, no immediate complications

## 2024-04-21 NOTE — DISCHARGE INSTRUCTIONS
Please schedule follow-up appointment with your primary care physician. You may continue to take your pain medications at home as previously instructed.  Return immediately if concerning symptoms, as discussed.  Below are the results of your imaging today to discuss with your primary care physician.  CT imaging Impression:  IMPRESSION:  No definite acute thoracic, abdominal, or pelvic pathology identified.  Moderate pancreatic atrophy.  Distal colonic diverticulosis.  No acute lumbar spine fracture identified.  Multilevel age-related  osteoarthritic changes as described.  Additional chronic changes as described.

## 2024-04-21 NOTE — PROGRESS NOTES
Patient is an 89-year-old female with a history of chronic back pain who presented for low back pain.  She was initially seen and evaluated by Dr. English and signed out to me pending CT imaging.  Per his assessment if CT did not show any acute emergent findings patient could be discharged home to follow-up with rheumatology and continue take your medications as previously indicated.  Please see his initial physician note for details.  CT imaging of the lumbar spine showed no definite acute thoracic, abdominal or pelvic pathology.  She has moderate pancreatic atrophy and diverticulosis.  There was no acute lumbar spine fracture identified and she has multilevel age-related osteoarthritic changes per report.  Other chronic findings per report.  Patient remained in stable condition while in the ED.  She is able to move about without difficulty.  She will be discharged to follow-up with PCP per my conversation with Dr. English.  She is educated on signs and symptoms that should prompt immediate turn to emergency room.    Odette Glynn MD

## 2024-04-22 ENCOUNTER — TELEPHONE (OUTPATIENT)
Dept: GERIATRIC MEDICINE | Facility: CLINIC | Age: 89
End: 2024-04-22
Payer: MEDICARE

## 2024-04-23 DIAGNOSIS — R53.1 WEAKNESS GENERALIZED: ICD-10-CM
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
## 2024-05-09 ENCOUNTER — HOSPITAL ENCOUNTER (OUTPATIENT)
Facility: HOSPITAL | Age: 89
Setting detail: OBSERVATION
Discharge: HOME | End: 2024-05-11
Attending: EMERGENCY MEDICINE | Admitting: HOSPITALIST
Payer: MEDICARE

## 2024-05-09 ENCOUNTER — APPOINTMENT (OUTPATIENT)
Dept: CARDIOLOGY | Facility: HOSPITAL | Age: 89
End: 2024-05-09
Payer: MEDICARE

## 2024-05-09 ENCOUNTER — APPOINTMENT (OUTPATIENT)
Dept: RADIOLOGY | Facility: HOSPITAL | Age: 89
End: 2024-05-09
Payer: MEDICARE

## 2024-05-09 DIAGNOSIS — R41.82 ALTERED MENTAL STATUS, UNSPECIFIED ALTERED MENTAL STATUS TYPE: ICD-10-CM

## 2024-05-09 DIAGNOSIS — I10 BENIGN ESSENTIAL HYPERTENSION: Primary | ICD-10-CM

## 2024-05-09 LAB
ALBUMIN SERPL BCP-MCNC: 4.5 G/DL (ref 3.4–5)
ALP SERPL-CCNC: 89 U/L (ref 33–136)
ALT SERPL W P-5'-P-CCNC: 11 U/L (ref 7–45)
ANION GAP SERPL CALC-SCNC: 17 MMOL/L (ref 10–20)
APAP SERPL-MCNC: <10 UG/ML
AST SERPL W P-5'-P-CCNC: 16 U/L (ref 9–39)
BASOPHILS # BLD AUTO: 0.02 X10*3/UL (ref 0–0.1)
BASOPHILS NFR BLD AUTO: 0.3 %
BILIRUB SERPL-MCNC: 0.7 MG/DL (ref 0–1.2)
BUN SERPL-MCNC: 19 MG/DL (ref 6–23)
CALCIUM SERPL-MCNC: 9.6 MG/DL (ref 8.6–10.3)
CARDIAC TROPONIN I PNL SERPL HS: 6 NG/L (ref 0–13)
CHLORIDE SERPL-SCNC: 96 MMOL/L (ref 98–107)
CO2 SERPL-SCNC: 25 MMOL/L (ref 21–32)
CREAT SERPL-MCNC: 0.93 MG/DL (ref 0.5–1.05)
EGFRCR SERPLBLD CKD-EPI 2021: 60 ML/MIN/1.73M*2
EOSINOPHIL # BLD AUTO: 0.03 X10*3/UL (ref 0–0.4)
EOSINOPHIL NFR BLD AUTO: 0.4 %
ERYTHROCYTE [DISTWIDTH] IN BLOOD BY AUTOMATED COUNT: 12.7 % (ref 11.5–14.5)
ETHANOL SERPL-MCNC: <10 MG/DL
GLUCOSE SERPL-MCNC: 118 MG/DL (ref 74–99)
HCT VFR BLD AUTO: 42.8 % (ref 36–46)
HGB BLD-MCNC: 14.6 G/DL (ref 12–16)
IMM GRANULOCYTES # BLD AUTO: 0.02 X10*3/UL (ref 0–0.5)
IMM GRANULOCYTES NFR BLD AUTO: 0.3 % (ref 0–0.9)
LYMPHOCYTES # BLD AUTO: 1.37 X10*3/UL (ref 0.8–3)
LYMPHOCYTES NFR BLD AUTO: 17.6 %
MCH RBC QN AUTO: 31.6 PG (ref 26–34)
MCHC RBC AUTO-ENTMCNC: 34.1 G/DL (ref 32–36)
MCV RBC AUTO: 93 FL (ref 80–100)
MONOCYTES # BLD AUTO: 0.45 X10*3/UL (ref 0.05–0.8)
MONOCYTES NFR BLD AUTO: 5.8 %
NEUTROPHILS # BLD AUTO: 5.88 X10*3/UL (ref 1.6–5.5)
NEUTROPHILS NFR BLD AUTO: 75.6 %
NRBC BLD-RTO: 0 /100 WBCS (ref 0–0)
PLATELET # BLD AUTO: 325 X10*3/UL (ref 150–450)
POTASSIUM SERPL-SCNC: 3.9 MMOL/L (ref 3.5–5.3)
PROT SERPL-MCNC: 7.6 G/DL (ref 6.4–8.2)
RBC # BLD AUTO: 4.62 X10*6/UL (ref 4–5.2)
SALICYLATES SERPL-MCNC: <3 MG/DL
SARS-COV-2 RNA RESP QL NAA+PROBE: NOT DETECTED
SODIUM SERPL-SCNC: 134 MMOL/L (ref 136–145)
WBC # BLD AUTO: 7.8 X10*3/UL (ref 4.4–11.3)

## 2024-05-09 PROCEDURE — 87635 SARS-COV-2 COVID-19 AMP PRB: CPT | Performed by: EMERGENCY MEDICINE

## 2024-05-09 PROCEDURE — 87086 URINE CULTURE/COLONY COUNT: CPT | Mod: AHULAB | Performed by: EMERGENCY MEDICINE

## 2024-05-09 PROCEDURE — 80307 DRUG TEST PRSMV CHEM ANLYZR: CPT | Mod: MUEWO,MUE | Performed by: HOSPITALIST

## 2024-05-09 PROCEDURE — 81001 URINALYSIS AUTO W/SCOPE: CPT | Mod: 59 | Performed by: EMERGENCY MEDICINE

## 2024-05-09 PROCEDURE — 99285 EMERGENCY DEPT VISIT HI MDM: CPT | Mod: 25

## 2024-05-09 PROCEDURE — 84484 ASSAY OF TROPONIN QUANT: CPT | Mod: 91 | Performed by: EMERGENCY MEDICINE

## 2024-05-09 PROCEDURE — 80053 COMPREHEN METABOLIC PANEL: CPT | Performed by: EMERGENCY MEDICINE

## 2024-05-09 PROCEDURE — 80307 DRUG TEST PRSMV CHEM ANLYZR: CPT | Performed by: EMERGENCY MEDICINE

## 2024-05-09 PROCEDURE — 2500000004 HC RX 250 GENERAL PHARMACY W/ HCPCS (ALT 636 FOR OP/ED): Performed by: EMERGENCY MEDICINE

## 2024-05-09 PROCEDURE — 85025 COMPLETE CBC W/AUTO DIFF WBC: CPT | Performed by: EMERGENCY MEDICINE

## 2024-05-09 PROCEDURE — 93005 ELECTROCARDIOGRAM TRACING: CPT

## 2024-05-09 PROCEDURE — 36415 COLL VENOUS BLD VENIPUNCTURE: CPT | Performed by: EMERGENCY MEDICINE

## 2024-05-09 PROCEDURE — 96375 TX/PRO/DX INJ NEW DRUG ADDON: CPT | Mod: 59

## 2024-05-09 PROCEDURE — 82077 ASSAY SPEC XCP UR&BREATH IA: CPT | Mod: CCI | Performed by: HOSPITALIST

## 2024-05-09 PROCEDURE — 70450 CT HEAD/BRAIN W/O DYE: CPT

## 2024-05-09 PROCEDURE — 71045 X-RAY EXAM CHEST 1 VIEW: CPT

## 2024-05-09 PROCEDURE — 71045 X-RAY EXAM CHEST 1 VIEW: CPT | Performed by: RADIOLOGY

## 2024-05-09 PROCEDURE — 80179 DRUG ASSAY SALICYLATE: CPT | Performed by: EMERGENCY MEDICINE

## 2024-05-09 PROCEDURE — 70450 CT HEAD/BRAIN W/O DYE: CPT | Performed by: RADIOLOGY

## 2024-05-09 PROCEDURE — 84443 ASSAY THYROID STIM HORMONE: CPT | Performed by: HOSPITALIST

## 2024-05-09 RX ORDER — MIDAZOLAM HYDROCHLORIDE 1 MG/ML
2 INJECTION, SOLUTION INTRAMUSCULAR; INTRAVENOUS ONCE
Status: COMPLETED | OUTPATIENT
Start: 2024-05-09 | End: 2024-05-09

## 2024-05-09 RX ADMIN — SODIUM CHLORIDE 1000 ML: 9 INJECTION, SOLUTION INTRAVENOUS at 23:46

## 2024-05-09 RX ADMIN — MIDAZOLAM 2 MG: 1 INJECTION INTRAMUSCULAR; INTRAVENOUS at 23:47

## 2024-05-09 ASSESSMENT — COLUMBIA-SUICIDE SEVERITY RATING SCALE - C-SSRS
2. HAVE YOU ACTUALLY HAD ANY THOUGHTS OF KILLING YOURSELF?: NO
1. IN THE PAST MONTH, HAVE YOU WISHED YOU WERE DEAD OR WISHED YOU COULD GO TO SLEEP AND NOT WAKE UP?: NO
6. HAVE YOU EVER DONE ANYTHING, STARTED TO DO ANYTHING, OR PREPARED TO DO ANYTHING TO END YOUR LIFE?: NO

## 2024-05-10 PROBLEM — R41.82 ALTERED MENTAL STATUS: Status: ACTIVE | Noted: 2024-05-10

## 2024-05-10 LAB
AMPHETAMINES UR QL SCN: NORMAL
AMPHETAMINES UR QL SCN: NORMAL
ANION GAP SERPL CALC-SCNC: 15 MMOL/L (ref 10–20)
APPEARANCE UR: CLEAR
ATRIAL RATE: 93 BPM
BACTERIA #/AREA URNS AUTO: ABNORMAL /HPF
BARBITURATES UR QL SCN: NORMAL
BARBITURATES UR QL SCN: NORMAL
BASOPHILS # BLD AUTO: 0.05 X10*3/UL (ref 0–0.1)
BASOPHILS NFR BLD AUTO: 0.8 %
BENZODIAZ UR QL SCN: NORMAL
BENZODIAZ UR QL SCN: NORMAL
BILIRUB UR STRIP.AUTO-MCNC: NEGATIVE MG/DL
BUN SERPL-MCNC: 16 MG/DL (ref 6–23)
BZE UR QL SCN: NORMAL
BZE UR QL SCN: NORMAL
CALCIUM SERPL-MCNC: 9.1 MG/DL (ref 8.6–10.3)
CANNABINOIDS UR QL SCN: NORMAL
CANNABINOIDS UR QL SCN: NORMAL
CHLORIDE SERPL-SCNC: 101 MMOL/L (ref 98–107)
CO2 SERPL-SCNC: 24 MMOL/L (ref 21–32)
COLOR UR: ABNORMAL
CREAT SERPL-MCNC: 0.79 MG/DL (ref 0.5–1.05)
EGFRCR SERPLBLD CKD-EPI 2021: 73 ML/MIN/1.73M*2
EOSINOPHIL # BLD AUTO: 0.02 X10*3/UL (ref 0–0.4)
EOSINOPHIL NFR BLD AUTO: 0.3 %
ERYTHROCYTE [DISTWIDTH] IN BLOOD BY AUTOMATED COUNT: 12.8 % (ref 11.5–14.5)
EST. AVERAGE GLUCOSE BLD GHB EST-MCNC: 126 MG/DL
ETHANOL SERPL-MCNC: <10 MG/DL
FENTANYL+NORFENTANYL UR QL SCN: NORMAL
FENTANYL+NORFENTANYL UR QL SCN: NORMAL
GLUCOSE SERPL-MCNC: 85 MG/DL (ref 74–99)
GLUCOSE UR STRIP.AUTO-MCNC: NORMAL MG/DL
HBA1C MFR BLD: 6 %
HCT VFR BLD AUTO: 40.8 % (ref 36–46)
HGB BLD-MCNC: 13.3 G/DL (ref 12–16)
IMM GRANULOCYTES # BLD AUTO: 0.02 X10*3/UL (ref 0–0.5)
IMM GRANULOCYTES NFR BLD AUTO: 0.3 % (ref 0–0.9)
KETONES UR STRIP.AUTO-MCNC: ABNORMAL MG/DL
LEUKOCYTE ESTERASE UR QL STRIP.AUTO: ABNORMAL
LYMPHOCYTES # BLD AUTO: 2.32 X10*3/UL (ref 0.8–3)
LYMPHOCYTES NFR BLD AUTO: 36.8 %
MAGNESIUM SERPL-MCNC: 1.8 MG/DL (ref 1.6–2.4)
MCH RBC QN AUTO: 31 PG (ref 26–34)
MCHC RBC AUTO-ENTMCNC: 32.6 G/DL (ref 32–36)
MCV RBC AUTO: 95 FL (ref 80–100)
METHADONE UR QL SCN: NORMAL
METHADONE UR QL SCN: NORMAL
MONOCYTES # BLD AUTO: 0.54 X10*3/UL (ref 0.05–0.8)
MONOCYTES NFR BLD AUTO: 8.6 %
NEUTROPHILS # BLD AUTO: 3.35 X10*3/UL (ref 1.6–5.5)
NEUTROPHILS NFR BLD AUTO: 53.2 %
NITRITE UR QL STRIP.AUTO: NEGATIVE
NRBC BLD-RTO: 0 /100 WBCS (ref 0–0)
OPIATES UR QL SCN: NORMAL
OPIATES UR QL SCN: NORMAL
OXYCODONE+OXYMORPHONE UR QL SCN: NORMAL
OXYCODONE+OXYMORPHONE UR QL SCN: NORMAL
P AXIS: 65 DEGREES
P OFFSET: 192 MS
P ONSET: 136 MS
PCP UR QL SCN: NORMAL
PCP UR QL SCN: NORMAL
PH UR STRIP.AUTO: 6 [PH]
PLATELET # BLD AUTO: 303 X10*3/UL (ref 150–450)
POTASSIUM SERPL-SCNC: 3.8 MMOL/L (ref 3.5–5.3)
PR INTERVAL: 168 MS
PROT UR STRIP.AUTO-MCNC: NEGATIVE MG/DL
Q ONSET: 220 MS
QRS COUNT: 15 BEATS
QRS DURATION: 120 MS
QT INTERVAL: 400 MS
QTC CALCULATION(BAZETT): 497 MS
QTC FREDERICIA: 463 MS
R AXIS: 8 DEGREES
RBC # BLD AUTO: 4.29 X10*6/UL (ref 4–5.2)
RBC # UR STRIP.AUTO: NEGATIVE /UL
RBC #/AREA URNS AUTO: ABNORMAL /HPF
SODIUM SERPL-SCNC: 136 MMOL/L (ref 136–145)
SP GR UR STRIP.AUTO: 1.01
SQUAMOUS #/AREA URNS AUTO: ABNORMAL /HPF
T AXIS: 54 DEGREES
T OFFSET: 420 MS
TSH SERPL-ACNC: 1.61 MIU/L (ref 0.44–3.98)
UROBILINOGEN UR STRIP.AUTO-MCNC: NORMAL MG/DL
VENTRICULAR RATE: 93 BPM
WBC # BLD AUTO: 6.3 X10*3/UL (ref 4.4–11.3)
WBC #/AREA URNS AUTO: ABNORMAL /HPF

## 2024-05-10 PROCEDURE — G0378 HOSPITAL OBSERVATION PER HR: HCPCS

## 2024-05-10 PROCEDURE — 36415 COLL VENOUS BLD VENIPUNCTURE: CPT | Performed by: HOSPITALIST

## 2024-05-10 PROCEDURE — 85025 COMPLETE CBC W/AUTO DIFF WBC: CPT | Performed by: HOSPITALIST

## 2024-05-10 PROCEDURE — 2500000001 HC RX 250 WO HCPCS SELF ADMINISTERED DRUGS (ALT 637 FOR MEDICARE OP): Performed by: STUDENT IN AN ORGANIZED HEALTH CARE EDUCATION/TRAINING PROGRAM

## 2024-05-10 PROCEDURE — 2500000001 HC RX 250 WO HCPCS SELF ADMINISTERED DRUGS (ALT 637 FOR MEDICARE OP): Performed by: HOSPITALIST

## 2024-05-10 PROCEDURE — 2500000006 HC RX 250 W HCPCS SELF ADMINISTERED DRUGS (ALT 637 FOR ALL PAYERS): Performed by: HOSPITALIST

## 2024-05-10 PROCEDURE — 96361 HYDRATE IV INFUSION ADD-ON: CPT | Mod: 59

## 2024-05-10 PROCEDURE — 96365 THER/PROPH/DIAG IV INF INIT: CPT | Mod: 59

## 2024-05-10 PROCEDURE — 83036 HEMOGLOBIN GLYCOSYLATED A1C: CPT | Mod: AHULAB | Performed by: HOSPITALIST

## 2024-05-10 PROCEDURE — 99223 1ST HOSP IP/OBS HIGH 75: CPT | Performed by: HOSPITALIST

## 2024-05-10 PROCEDURE — 96372 THER/PROPH/DIAG INJ SC/IM: CPT | Mod: 59 | Performed by: HOSPITALIST

## 2024-05-10 PROCEDURE — 2500000004 HC RX 250 GENERAL PHARMACY W/ HCPCS (ALT 636 FOR OP/ED): Performed by: HOSPITALIST

## 2024-05-10 PROCEDURE — 82374 ASSAY BLOOD CARBON DIOXIDE: CPT | Performed by: HOSPITALIST

## 2024-05-10 PROCEDURE — 83735 ASSAY OF MAGNESIUM: CPT | Performed by: HOSPITALIST

## 2024-05-10 RX ORDER — DEXTROSE 50 % IN WATER (D50W) INTRAVENOUS SYRINGE
12.5
Status: DISCONTINUED | OUTPATIENT
Start: 2024-05-10 | End: 2024-05-11 | Stop reason: HOSPADM

## 2024-05-10 RX ORDER — DORZOLAMIDE HYDROCHLORIDE AND TIMOLOL MALEATE 20; 5 MG/ML; MG/ML
1 SOLUTION/ DROPS OPHTHALMIC 2 TIMES DAILY
Status: DISCONTINUED | OUTPATIENT
Start: 2024-05-10 | End: 2024-05-11 | Stop reason: HOSPADM

## 2024-05-10 RX ORDER — ENOXAPARIN SODIUM 100 MG/ML
40 INJECTION SUBCUTANEOUS EVERY 24 HOURS
Status: DISCONTINUED | OUTPATIENT
Start: 2024-05-10 | End: 2024-05-11 | Stop reason: HOSPADM

## 2024-05-10 RX ORDER — DEXTROSE 50 % IN WATER (D50W) INTRAVENOUS SYRINGE
25
Status: DISCONTINUED | OUTPATIENT
Start: 2024-05-10 | End: 2024-05-11 | Stop reason: HOSPADM

## 2024-05-10 RX ORDER — CEFTRIAXONE 1 G/50ML
1 INJECTION, SOLUTION INTRAVENOUS EVERY 24 HOURS
Status: DISCONTINUED | OUTPATIENT
Start: 2024-05-10 | End: 2024-05-11 | Stop reason: HOSPADM

## 2024-05-10 RX ORDER — ONDANSETRON 4 MG/1
4 TABLET, ORALLY DISINTEGRATING ORAL EVERY 8 HOURS PRN
Status: DISCONTINUED | OUTPATIENT
Start: 2024-05-10 | End: 2024-05-11 | Stop reason: HOSPADM

## 2024-05-10 RX ORDER — POLYETHYLENE GLYCOL 3350 17 G/17G
17 POWDER, FOR SOLUTION ORAL DAILY
Status: DISCONTINUED | OUTPATIENT
Start: 2024-05-10 | End: 2024-05-11 | Stop reason: HOSPADM

## 2024-05-10 RX ORDER — LEVOTHYROXINE SODIUM 75 UG/1
75 TABLET ORAL
Status: DISCONTINUED | OUTPATIENT
Start: 2024-05-10 | End: 2024-05-11 | Stop reason: HOSPADM

## 2024-05-10 RX ORDER — SODIUM CHLORIDE 9 MG/ML
75 INJECTION, SOLUTION INTRAVENOUS CONTINUOUS
Status: DISCONTINUED | OUTPATIENT
Start: 2024-05-10 | End: 2024-05-10

## 2024-05-10 RX ORDER — VALSARTAN 80 MG/1
80 TABLET ORAL DAILY
Status: DISCONTINUED | OUTPATIENT
Start: 2024-05-10 | End: 2024-05-10

## 2024-05-10 RX ORDER — ASPIRIN 81 MG/1
81 TABLET ORAL DAILY
Status: DISCONTINUED | OUTPATIENT
Start: 2024-05-10 | End: 2024-05-11 | Stop reason: HOSPADM

## 2024-05-10 RX ORDER — ACETAMINOPHEN 325 MG/1
650 TABLET ORAL EVERY 4 HOURS PRN
Status: DISCONTINUED | OUTPATIENT
Start: 2024-05-10 | End: 2024-05-11 | Stop reason: HOSPADM

## 2024-05-10 RX ORDER — DULOXETIN HYDROCHLORIDE 30 MG/1
30 CAPSULE, DELAYED RELEASE ORAL DAILY
Status: DISCONTINUED | OUTPATIENT
Start: 2024-05-10 | End: 2024-05-11 | Stop reason: HOSPADM

## 2024-05-10 RX ORDER — BRIMONIDINE TARTRATE 2 MG/ML
1 SOLUTION/ DROPS OPHTHALMIC 2 TIMES DAILY
Status: DISCONTINUED | OUTPATIENT
Start: 2024-05-10 | End: 2024-05-11 | Stop reason: HOSPADM

## 2024-05-10 RX ORDER — PANTOPRAZOLE SODIUM 40 MG/1
40 TABLET, DELAYED RELEASE ORAL
Status: DISCONTINUED | OUTPATIENT
Start: 2024-05-10 | End: 2024-05-11 | Stop reason: HOSPADM

## 2024-05-10 RX ORDER — ONDANSETRON HYDROCHLORIDE 2 MG/ML
4 INJECTION, SOLUTION INTRAVENOUS EVERY 8 HOURS PRN
Status: DISCONTINUED | OUTPATIENT
Start: 2024-05-10 | End: 2024-05-11 | Stop reason: HOSPADM

## 2024-05-10 RX ORDER — TALC
3 POWDER (GRAM) TOPICAL NIGHTLY PRN
Status: DISCONTINUED | OUTPATIENT
Start: 2024-05-10 | End: 2024-05-11 | Stop reason: HOSPADM

## 2024-05-10 RX ORDER — PREGABALIN 100 MG/1
100 CAPSULE ORAL DAILY
Status: DISCONTINUED | OUTPATIENT
Start: 2024-05-10 | End: 2024-05-11 | Stop reason: HOSPADM

## 2024-05-10 RX ORDER — TRAMADOL HYDROCHLORIDE 50 MG/1
50 TABLET ORAL 3 TIMES DAILY PRN
Status: DISCONTINUED | OUTPATIENT
Start: 2024-05-10 | End: 2024-05-11 | Stop reason: HOSPADM

## 2024-05-10 RX ORDER — VALSARTAN 80 MG/1
160 TABLET ORAL DAILY
Status: DISCONTINUED | OUTPATIENT
Start: 2024-05-11 | End: 2024-05-11 | Stop reason: HOSPADM

## 2024-05-10 RX ORDER — PREGABALIN 75 MG/1
150 CAPSULE ORAL DAILY
Status: DISCONTINUED | OUTPATIENT
Start: 2024-05-10 | End: 2024-05-10

## 2024-05-10 RX ORDER — CARBIDOPA AND LEVODOPA 25; 100 MG/1; MG/1
1 TABLET ORAL 3 TIMES DAILY
Status: DISCONTINUED | OUTPATIENT
Start: 2024-05-10 | End: 2024-05-11 | Stop reason: HOSPADM

## 2024-05-10 RX ADMIN — DULOXETINE HYDROCHLORIDE 30 MG: 30 CAPSULE, DELAYED RELEASE ORAL at 09:52

## 2024-05-10 RX ADMIN — ACETAMINOPHEN 650 MG: 325 TABLET ORAL at 07:41

## 2024-05-10 RX ADMIN — BRIMONIDINE TARTRATE 1 DROP: 2 SOLUTION/ DROPS OPHTHALMIC at 09:52

## 2024-05-10 RX ADMIN — CARBIDOPA AND LEVODOPA 1 TABLET: 25; 100 TABLET ORAL at 09:52

## 2024-05-10 RX ADMIN — LEVOTHYROXINE SODIUM 75 MCG: 75 TABLET ORAL at 06:36

## 2024-05-10 RX ADMIN — ACETAMINOPHEN 650 MG: 325 TABLET ORAL at 04:09

## 2024-05-10 RX ADMIN — CARBIDOPA AND LEVODOPA 1 TABLET: 25; 100 TABLET ORAL at 15:25

## 2024-05-10 RX ADMIN — CARBIDOPA AND LEVODOPA 1 TABLET: 25; 100 TABLET ORAL at 20:38

## 2024-05-10 RX ADMIN — PANTOPRAZOLE SODIUM 40 MG: 40 TABLET, DELAYED RELEASE ORAL at 06:36

## 2024-05-10 RX ADMIN — ACETAMINOPHEN 650 MG: 325 TABLET ORAL at 13:31

## 2024-05-10 RX ADMIN — TRAMADOL HYDROCHLORIDE 50 MG: 50 TABLET ORAL at 20:38

## 2024-05-10 RX ADMIN — SODIUM CHLORIDE 75 ML/HR: 9 INJECTION, SOLUTION INTRAVENOUS at 05:10

## 2024-05-10 RX ADMIN — PREGABALIN 100 MG: 100 CAPSULE ORAL at 17:41

## 2024-05-10 RX ADMIN — DORZOLAMIDE HYDROCHLORIDE AND TIMOLOL MALEATE 1 DROP: 22.3; 6.8 SOLUTION/ DROPS OPHTHALMIC at 20:38

## 2024-05-10 RX ADMIN — BRIMONIDINE TARTRATE 1 DROP: 2 SOLUTION/ DROPS OPHTHALMIC at 04:10

## 2024-05-10 RX ADMIN — DORZOLAMIDE HYDROCHLORIDE AND TIMOLOL MALEATE 1 DROP: 22.3; 6.8 SOLUTION/ DROPS OPHTHALMIC at 09:52

## 2024-05-10 RX ADMIN — ENOXAPARIN SODIUM 40 MG: 40 INJECTION SUBCUTANEOUS at 09:52

## 2024-05-10 RX ADMIN — CEFTRIAXONE SODIUM 1 G: 1 INJECTION, SOLUTION INTRAVENOUS at 04:09

## 2024-05-10 RX ADMIN — SODIUM CHLORIDE 75 ML/HR: 9 INJECTION, SOLUTION INTRAVENOUS at 15:25

## 2024-05-10 RX ADMIN — DORZOLAMIDE HYDROCHLORIDE AND TIMOLOL MALEATE 1 DROP: 22.3; 6.8 SOLUTION/ DROPS OPHTHALMIC at 04:10

## 2024-05-10 RX ADMIN — ASPIRIN 81 MG: 81 TABLET, COATED ORAL at 09:52

## 2024-05-10 RX ADMIN — VALSARTAN 80 MG: 80 TABLET, FILM COATED ORAL at 09:52

## 2024-05-10 RX ADMIN — BRIMONIDINE TARTRATE 1 DROP: 2 SOLUTION/ DROPS OPHTHALMIC at 20:38

## 2024-05-10 RX ADMIN — Medication 3 MG: at 20:38

## 2024-05-10 SDOH — HEALTH STABILITY: MENTAL HEALTH: HOW MANY STANDARD DRINKS CONTAINING ALCOHOL DO YOU HAVE ON A TYPICAL DAY?: 1 OR 2

## 2024-05-10 SDOH — ECONOMIC STABILITY: HOUSING INSECURITY
IN THE LAST 12 MONTHS, WAS THERE A TIME WHEN YOU DID NOT HAVE A STEADY PLACE TO SLEEP OR SLEPT IN A SHELTER (INCLUDING NOW)?: NO

## 2024-05-10 SDOH — SOCIAL STABILITY: SOCIAL INSECURITY: WERE YOU ABLE TO COMPLETE ALL THE BEHAVIORAL HEALTH SCREENINGS?: YES

## 2024-05-10 SDOH — SOCIAL STABILITY: SOCIAL INSECURITY: HAVE YOU HAD THOUGHTS OF HARMING ANYONE ELSE?: NO

## 2024-05-10 SDOH — ECONOMIC STABILITY: INCOME INSECURITY: HOW HARD IS IT FOR YOU TO PAY FOR THE VERY BASICS LIKE FOOD, HOUSING, MEDICAL CARE, AND HEATING?: NOT HARD AT ALL

## 2024-05-10 SDOH — HEALTH STABILITY: MENTAL HEALTH: HOW OFTEN DO YOU HAVE A DRINK CONTAINING ALCOHOL?: 2-3 TIMES A WEEK

## 2024-05-10 SDOH — ECONOMIC STABILITY: INCOME INSECURITY: IN THE LAST 12 MONTHS, WAS THERE A TIME WHEN YOU WERE NOT ABLE TO PAY THE MORTGAGE OR RENT ON TIME?: NO

## 2024-05-10 SDOH — SOCIAL STABILITY: SOCIAL INSECURITY: ARE THERE ANY APPARENT SIGNS OF INJURIES/BEHAVIORS THAT COULD BE RELATED TO ABUSE/NEGLECT?: NO

## 2024-05-10 SDOH — ECONOMIC STABILITY: TRANSPORTATION INSECURITY
IN THE PAST 12 MONTHS, HAS LACK OF TRANSPORTATION KEPT YOU FROM MEETINGS, WORK, OR FROM GETTING THINGS NEEDED FOR DAILY LIVING?: NO

## 2024-05-10 SDOH — SOCIAL STABILITY: SOCIAL INSECURITY: HAVE YOU HAD ANY THOUGHTS OF HARMING ANYONE ELSE?: NO

## 2024-05-10 SDOH — SOCIAL STABILITY: SOCIAL INSECURITY: DO YOU FEEL UNSAFE GOING BACK TO THE PLACE WHERE YOU ARE LIVING?: NO

## 2024-05-10 SDOH — SOCIAL STABILITY: SOCIAL INSECURITY: DOES ANYONE TRY TO KEEP YOU FROM HAVING/CONTACTING OTHER FRIENDS OR DOING THINGS OUTSIDE YOUR HOME?: NO

## 2024-05-10 SDOH — HEALTH STABILITY: MENTAL HEALTH: HOW OFTEN DO YOU HAVE 6 OR MORE DRINKS ON ONE OCCASION?: WEEKLY

## 2024-05-10 SDOH — SOCIAL STABILITY: SOCIAL INSECURITY: DO YOU FEEL ANYONE HAS EXPLOITED OR TAKEN ADVANTAGE OF YOU FINANCIALLY OR OF YOUR PERSONAL PROPERTY?: NO

## 2024-05-10 SDOH — ECONOMIC STABILITY: TRANSPORTATION INSECURITY
IN THE PAST 12 MONTHS, HAS THE LACK OF TRANSPORTATION KEPT YOU FROM MEDICAL APPOINTMENTS OR FROM GETTING MEDICATIONS?: NO

## 2024-05-10 SDOH — SOCIAL STABILITY: SOCIAL INSECURITY: HAS ANYONE EVER THREATENED TO HURT YOUR FAMILY OR YOUR PETS?: NO

## 2024-05-10 SDOH — SOCIAL STABILITY: SOCIAL INSECURITY: ARE YOU OR HAVE YOU BEEN THREATENED OR ABUSED PHYSICALLY, EMOTIONALLY, OR SEXUALLY BY ANYONE?: NO

## 2024-05-10 SDOH — SOCIAL STABILITY: SOCIAL INSECURITY: ABUSE: ADULT

## 2024-05-10 SDOH — HEALTH STABILITY: PHYSICAL HEALTH: ON AVERAGE, HOW MANY DAYS PER WEEK DO YOU ENGAGE IN MODERATE TO STRENUOUS EXERCISE (LIKE A BRISK WALK)?: 0 DAYS

## 2024-05-10 SDOH — ECONOMIC STABILITY: HOUSING INSECURITY: IN THE LAST 12 MONTHS, HOW MANY PLACES HAVE YOU LIVED?: 1

## 2024-05-10 ASSESSMENT — COGNITIVE AND FUNCTIONAL STATUS - GENERAL
TOILETING: A LITTLE
DRESSING REGULAR LOWER BODY CLOTHING: A LITTLE
DAILY ACTIVITIY SCORE: 21
DRESSING REGULAR LOWER BODY CLOTHING: A LITTLE
DAILY ACTIVITIY SCORE: 21
HELP NEEDED FOR BATHING: A LITTLE
PERSONAL GROOMING: A LITTLE
TOILETING: A LITTLE
DRESSING REGULAR UPPER BODY CLOTHING: A LITTLE
PERSONAL GROOMING: A LITTLE
MOBILITY SCORE: 23
MOBILITY SCORE: 22
CLIMB 3 TO 5 STEPS WITH RAILING: A LITTLE
DAILY ACTIVITIY SCORE: 21
DRESSING REGULAR LOWER BODY CLOTHING: A LITTLE
MOBILITY SCORE: 23
HELP NEEDED FOR BATHING: A LITTLE
CLIMB 3 TO 5 STEPS WITH RAILING: A LITTLE
WALKING IN HOSPITAL ROOM: A LITTLE
DAILY ACTIVITIY SCORE: 22
CLIMB 3 TO 5 STEPS WITH RAILING: A LITTLE
CLIMB 3 TO 5 STEPS WITH RAILING: A LITTLE
DRESSING REGULAR UPPER BODY CLOTHING: A LITTLE
PATIENT BASELINE BEDBOUND: YES
WALKING IN HOSPITAL ROOM: A LITTLE
MOBILITY SCORE: 22

## 2024-05-10 ASSESSMENT — ACTIVITIES OF DAILY LIVING (ADL)
LACK_OF_TRANSPORTATION: NO
HEARING - RIGHT EAR: HEARING AID
HEARING - LEFT EAR: HEARING AID
JUDGMENT_ADEQUATE_SAFELY_COMPLETE_DAILY_ACTIVITIES: YES
GROOMING: INDEPENDENT
WALKS IN HOME: INDEPENDENT
TOILETING: INDEPENDENT
ADEQUATE_TO_COMPLETE_ADL: YES
BATHING: INDEPENDENT
FEEDING YOURSELF: INDEPENDENT
PATIENT'S MEMORY ADEQUATE TO SAFELY COMPLETE DAILY ACTIVITIES?: YES
DRESSING YOURSELF: INDEPENDENT

## 2024-05-10 ASSESSMENT — PAIN - FUNCTIONAL ASSESSMENT
PAIN_FUNCTIONAL_ASSESSMENT: 0-10
PAIN_FUNCTIONAL_ASSESSMENT: 0-10
PAIN_FUNCTIONAL_ASSESSMENT: CRIES (CRYING REQUIRES OXYGEN INCREASED VITAL SIGNS EXPRESSION SLEEP)
PAIN_FUNCTIONAL_ASSESSMENT: 0-10
PAIN_FUNCTIONAL_ASSESSMENT: 0-10

## 2024-05-10 ASSESSMENT — PAIN SCALES - GENERAL
PAINLEVEL_OUTOF10: 4
PAINLEVEL_OUTOF10: 2
PAINLEVEL_OUTOF10: 4
PAINLEVEL_OUTOF10: 0 - NO PAIN
PAINLEVEL_OUTOF10: 0 - NO PAIN
PAINLEVEL_OUTOF10: 1
PAINLEVEL_OUTOF10: 3

## 2024-05-10 ASSESSMENT — LIFESTYLE VARIABLES
HOW MANY STANDARD DRINKS CONTAINING ALCOHOL DO YOU HAVE ON A TYPICAL DAY: 1 OR 2
AUDIT-C TOTAL SCORE: 5
HOW OFTEN DO YOU HAVE A DRINK CONTAINING ALCOHOL: 2-4 TIMES A MONTH
AUDIT-C TOTAL SCORE: 6
SKIP TO QUESTIONS 9-10: 0
AUDIT-C TOTAL SCORE: 5
SKIP TO QUESTIONS 9-10: 0
HOW OFTEN DO YOU HAVE 6 OR MORE DRINKS ON ONE OCCASION: WEEKLY

## 2024-05-10 ASSESSMENT — PATIENT HEALTH QUESTIONNAIRE - PHQ9
1. LITTLE INTEREST OR PLEASURE IN DOING THINGS: NOT AT ALL
SUM OF ALL RESPONSES TO PHQ9 QUESTIONS 1 & 2: 0
2. FEELING DOWN, DEPRESSED OR HOPELESS: NOT AT ALL

## 2024-05-10 ASSESSMENT — PAIN SCALES - WONG BAKER
WONGBAKER_NUMERICALRESPONSE: HURTS WHOLE LOT
WONGBAKER_NUMERICALRESPONSE: HURTS LITTLE BIT
WONGBAKER_NUMERICALRESPONSE: HURTS LITTLE MORE

## 2024-05-10 ASSESSMENT — PAIN DESCRIPTION - LOCATION
LOCATION: BACK
LOCATION: BACK

## 2024-05-10 ASSESSMENT — PAIN DESCRIPTION - ORIENTATION
ORIENTATION: MID
ORIENTATION: LEFT;LOWER;UPPER

## 2024-05-10 NOTE — PROGRESS NOTES
05/10/24 0950   [facility] Disability Status   Are you deaf or do you have serious difficulty hearing? N   Are you blind or do you have serious difficulty seeing, even when wearing glasses? N   Because of a physical, mental, or emotional condition, do you have serious difficulty concentrating, remembering, or making decisions? (5 years old or older) Y   Do you have serious difficulty walking or climbing stairs? Y   Do you have serious difficulty dressing or bathing? Y   Because of a physical, mental, or emotional condition, do you have serious difficulty doing errands alone such as visiting the doctor? Y

## 2024-05-10 NOTE — H&P
History Of Present Illness  Lewis Rose is a 89 y.o. female with a  PMH of Keyes's esophagus, HTN, HLD, GERD, hypothyroidism, Parkinson's disease, DM2, anxiety, major depressive disorder, vascular dementia, JIMBO, presenting with AMS from her assisted living facility.   Ms Rose is not able to provide much history. She is oriented to person only. When asked, states she is eating well, appetite ok, denies dysuria or increased urinary frequency.   Denies N/V/D.   Initially answered by questions, but then started talking about a dog, then a doctor of hers that developed something to keep pregnant females from strokes with a medication that makes their urine like oatmeal....  When asked about alcohol use she admits to drinking $20 bottles of wine that her daughter brings her. States she takes Tramadol with wine since the tramadol takes 4 hrs to work. Also mentions drinking wine in the early am if she wakes up and cannot get back to sleep.     Work up in the ED shows normal CBC, unremarkable CMP.   COVID neg.   UA positive for LE and bacteria.      Past Medical History  Past Medical History:   Diagnosis Date    Age-related nuclear cataract, left eye 04/04/2016    Age-related nuclear cataract of left eye    Age-related nuclear cataract, right eye 04/04/2016    Age-related nuclear cataract of right eye    Anxiety     Arthritis     Conjunctival cysts, left eye 10/02/2015    Conjunctival cyst of left eye    Cortical age-related cataract, left eye 10/02/2015    Cortical age-related cataract of left eye    Cortical age-related cataract, right eye 10/08/2015    Cortical age-related cataract of right eye    Diabetes mellitus (Multi)     Disease of thyroid gland     Dry eye syndrome of bilateral lacrimal glands     Dry eyes    Encounter for general adult medical examination without abnormal findings 09/26/2017    Preventative health care    Encounter for other procedures for purposes other than remedying health state  10/13/2015    Prophylactic measure    Glaucoma     Meibomian gland dysfunction right eye, upper and lower eyelids 08/30/2022    Meibomian gland dysfunction (MGD) of upper and lower lids of both eyes    Other conditions influencing health status     Denial Of Any Significant Medical History    Other specified anxiety disorders 05/20/2021    Depression with anxiety    Pain in unspecified hip 07/10/2020    Painful hip    Pain in unspecified hip 07/02/2019    Painful hip    Pain in unspecified knee 06/09/2021    Knee pain    Parkinson's disease (Multi)     Personal history of malignant melanoma of skin 04/04/2016    History of malignant melanoma    Personal history of other diseases of the musculoskeletal system and connective tissue 06/03/2018    History of neck pain    Personal history of other diseases of the nervous system and sense organs     History of cataract    Personal history of other diseases of urinary system     History of hematuria    Personal history of other drug therapy 08/19/2021    History of drug therapy    Personal history of other endocrine, nutritional and metabolic disease 05/20/2021    History of elevated lipids    Personal history of other endocrine, nutritional and metabolic disease 04/04/2016    History of diabetes mellitus    Personal history of other infectious and parasitic diseases 05/05/2017    History of candidiasis of mouth    Personal history of other mental and behavioral disorders 04/04/2016    History of anxiety    Persons encountering health services in other specified circumstances 05/20/2021    Encounter to establish care with new doctor    Preglaucoma, unspecified, bilateral 07/30/2015    Glaucoma suspect of both eyes    Preglaucoma, unspecified, bilateral 04/04/2016    Glaucoma suspect of both eyes    Preglaucoma, unspecified, bilateral 08/03/2015    Glaucoma suspect of both eyes    Presence of intraocular lens 05/07/2018    Pseudophakia of left eye    Unspecified blepharitis  left eye, unspecified eyelid 04/04/2016    Blepharitis of left eye    Unspecified blepharitis right eye, unspecified eyelid 04/04/2016    Blepharitis of right eye       Surgical History  Past Surgical History:   Procedure Laterality Date    APPENDECTOMY  04/04/2016    Appendectomy    BELT ABDOMINOPLASTY  04/04/2016    Abdominoplasty    CATARACT EXTRACTION  04/04/2016    Cataract Surgery    OTHER SURGICAL HISTORY  07/01/2022    Knee replacement    TOTAL THYROIDECTOMY  04/04/2016    Thyroid Surgery Total Thyroidectomy        Social History  She reports that she has never smoked. She has never used smokeless tobacco. She reports that she does not currently use alcohol. She reports that she does not use drugs.    Family History  Family History   Problem Relation Name Age of Onset    Breast cancer Mother      Accidental death Father      Other (CVA) Brother          Allergies  Adhesive tape-silicones and Sitagliptin    Review of Systems   Unable to perform ROS: Mental status change        Physical Exam  Vitals reviewed.   Constitutional:       Appearance: Normal appearance.      Comments: Sleeping, but easily awakened. Is oriented to person only. Does not appear acutely ill.      HENT:      Head: Normocephalic and atraumatic.      Mouth/Throat:      Mouth: Mucous membranes are moist.   Eyes:      Extraocular Movements: Extraocular movements intact.      Conjunctiva/sclera: Conjunctivae normal.      Pupils: Pupils are equal, round, and reactive to light.   Cardiovascular:      Rate and Rhythm: Normal rate and regular rhythm.      Pulses: Normal pulses.      Heart sounds: Normal heart sounds.   Pulmonary:      Effort: Pulmonary effort is normal.      Breath sounds: Normal breath sounds.   Abdominal:      General: Abdomen is flat. Bowel sounds are normal.      Palpations: Abdomen is soft.      Tenderness: There is abdominal tenderness.      Comments: Abdomen flat, BS normal, diffusely tender to palpation   Musculoskeletal:          General: Normal range of motion.   Skin:     General: Skin is warm and dry.   Neurological:      General: No focal deficit present.      Mental Status: She is alert.      Comments: Moves all extremities  Oriented to person only   Psychiatric:      Comments: Cannot assess          Last Recorded Vitals  Blood pressure 118/65, pulse 92, temperature 36.8 °C (98.2 °F), temperature source Temporal, resp. rate 20, height 1.524 m (5'), weight 46.7 kg (103 lb), SpO2 94%.    Relevant Results        Results for orders placed or performed during the hospital encounter of 05/09/24 (from the past 24 hour(s))   CBC and Auto Differential   Result Value Ref Range    WBC 7.8 4.4 - 11.3 x10*3/uL    nRBC 0.0 0.0 - 0.0 /100 WBCs    RBC 4.62 4.00 - 5.20 x10*6/uL    Hemoglobin 14.6 12.0 - 16.0 g/dL    Hematocrit 42.8 36.0 - 46.0 %    MCV 93 80 - 100 fL    MCH 31.6 26.0 - 34.0 pg    MCHC 34.1 32.0 - 36.0 g/dL    RDW 12.7 11.5 - 14.5 %    Platelets 325 150 - 450 x10*3/uL    Neutrophils % 75.6 40.0 - 80.0 %    Immature Granulocytes %, Automated 0.3 0.0 - 0.9 %    Lymphocytes % 17.6 13.0 - 44.0 %    Monocytes % 5.8 2.0 - 10.0 %    Eosinophils % 0.4 0.0 - 6.0 %    Basophils % 0.3 0.0 - 2.0 %    Neutrophils Absolute 5.88 (H) 1.60 - 5.50 x10*3/uL    Immature Granulocytes Absolute, Automated 0.02 0.00 - 0.50 x10*3/uL    Lymphocytes Absolute 1.37 0.80 - 3.00 x10*3/uL    Monocytes Absolute 0.45 0.05 - 0.80 x10*3/uL    Eosinophils Absolute 0.03 0.00 - 0.40 x10*3/uL    Basophils Absolute 0.02 0.00 - 0.10 x10*3/uL   Comprehensive metabolic panel   Result Value Ref Range    Glucose 118 (H) 74 - 99 mg/dL    Sodium 134 (L) 136 - 145 mmol/L    Potassium 3.9 3.5 - 5.3 mmol/L    Chloride 96 (L) 98 - 107 mmol/L    Bicarbonate 25 21 - 32 mmol/L    Anion Gap 17 10 - 20 mmol/L    Urea Nitrogen 19 6 - 23 mg/dL    Creatinine 0.93 0.50 - 1.05 mg/dL    eGFR 60 (L) >60 mL/min/1.73m*2    Calcium 9.6 8.6 - 10.3 mg/dL    Albumin 4.5 3.4 - 5.0 g/dL    Alkaline  Phosphatase 89 33 - 136 U/L    Total Protein 7.6 6.4 - 8.2 g/dL    AST 16 9 - 39 U/L    Bilirubin, Total 0.7 0.0 - 1.2 mg/dL    ALT 11 7 - 45 U/L   Sars-CoV-2 PCR   Result Value Ref Range    Coronavirus 2019, PCR Not Detected Not Detected   Troponin I, High Sensitivity   Result Value Ref Range    Troponin I, High Sensitivity 6 0 - 13 ng/L   Acute Toxicology Panel, Blood   Result Value Ref Range    Acetaminophen <10.0 10.0 - 30.0 ug/mL    Salicylate  <3 4 - 20 mg/dL    Alcohol <10 <=10 mg/dL   Urinalysis with Reflex Culture and Microscopic   Result Value Ref Range    Color, Urine Light-Yellow Light-Yellow, Yellow, Dark-Yellow    Appearance, Urine Clear Clear    Specific Gravity, Urine 1.011 1.005 - 1.035    pH, Urine 6.0 5.0, 5.5, 6.0, 6.5, 7.0, 7.5, 8.0    Protein, Urine NEGATIVE NEGATIVE, 10 (TRACE), 20 (TRACE) mg/dL    Glucose, Urine Normal Normal mg/dL    Blood, Urine NEGATIVE NEGATIVE    Ketones, Urine 20 (1+) (A) NEGATIVE mg/dL    Bilirubin, Urine NEGATIVE NEGATIVE    Urobilinogen, Urine Normal Normal mg/dL    Nitrite, Urine NEGATIVE NEGATIVE    Leukocyte Esterase, Urine 250 [place]/µL (A) NEGATIVE   Drug Screen, Urine   Result Value Ref Range    Amphetamine Screen, Urine Presumptive Negative Presumptive Negative    Barbiturate Screen, Urine Presumptive Negative Presumptive Negative    Benzodiazepines Screen, Urine Presumptive Negative Presumptive Negative    Cannabinoid Screen, Urine Presumptive Negative Presumptive Negative    Cocaine Metabolite Screen, Urine Presumptive Negative Presumptive Negative    Fentanyl Screen, Urine Presumptive Negative Presumptive Negative    Opiate Screen, Urine Presumptive Negative Presumptive Negative    Oxycodone Screen, Urine Presumptive Negative Presumptive Negative    PCP Screen, Urine Presumptive Negative Presumptive Negative    Methadone Screen, Urine Presumptive Negative Presumptive Negative   Microscopic Only, Urine   Result Value Ref Range    WBC, Urine 6-10 (A) 1-5, NONE  /HPF    RBC, Urine 1-2 NONE, 1-2, 3-5 /HPF    Squamous Epithelial Cells, Urine 1-9 (SPARSE) Reference range not established. /HPF    Bacteria, Urine 1+ (A) NONE SEEN /HPF     CT head wo IV contrast    Result Date: 5/9/2024  Interpreted By:  Branden Lea, STUDY: CT HEAD WO IV CONTRAST;  5/9/2024 8:58 pm   INDICATION: Signs/Symptoms:ams.   COMPARISON: None   ACCESSION NUMBER(S): [id number]   ORDERING CLINICIAN: ANGEL LUIS KANG   TECHNIQUE: Contiguous axial images of the head were obtained without intravenous contrast.   FINDINGS: The examination is limited secondary to patient motion.   BRAIN PARENCHYMA:  There is cerebral atrophy and chronic periventricular white matter small vessel ischemic change. Small hypodensity in posterior limb of left internal capsule compatible with old lacunar infarct. The gray white matter differentiation is preserved.  No mass effect or midline shift.   HEMORRHAGE:  No evidence of acute intracranial hemorrhage. VENTRICLES AND EXTRA-AXIAL SPACES:  The ventricles are within normal limits in size for brain volume.  No evidence of abnormal extraaxial fluid collection. EXTRACRANIAL SOFT TISSUES:  Within normal limits. PARANASAL SINUSES/MASTOIDS:  The visualized paranasal sinuses and mastoid air cells are clear and well pneumatized. CALVARIUM:  No evidence of depressed calvarial fracture.   OTHER FINDINGS:  None       Cerebral atrophy and chronic periventricular white matter small vessel ischemic change.   No evidence of acute intracranial hemorrhage.       MACRO: None   Signed by: Branden Lea 5/9/2024 9:10 PM Dictation workstation:   [id number]    XR chest 1 view    Result Date: 5/9/2024  Interpreted By:  Branden Lea, STUDY: XR CHEST 1 VIEW;  5/9/2024 8:44 pm   INDICATION: Signs/Symptoms:ams.   COMPARISON: 12/6/2022   ACCESSION NUMBER(S): [id number]   ORDERING CLINICIAN: ANGEL LUIS KANG   FINDINGS: The cardiac silhouette is normal in size. No focal airspace consolidation or pleural  "effusion. No pneumothorax.       No airspace consolidation or pleural effusion.   MACRO: None   Signed by: Branden Lea 5/9/2024 9:08 PM Dictation workstation:   [id number]       Assessment/Plan   Principal Problem:    Altered mental status  - most likely due to UTI  - However, pt mentions ETOH use combined with Tramadol, will check ETOH level  - Has a hx of \"vascular dementia\"; but also has Parkinson's, so may have dementia secondary to Parkinson's   - check TSH  - Expect it to improve with treatment of UTI if this is the cause.     UTI  - Ceftriaxone  - Urine culture    Parkinson's disease  - Continue Sinemet    Hypothyroidism  - continue synthroid  - check TSH    HTN  - continue Diovan  - Hold diuretic    GERD/Keyes's   - Continue PPI    DM2  - listed on problem list, however, not on medication for this  - ISS  - check HbA1c    Code status  - Uncertain       I spent 60 minutes in the professional and overall care of this patient.      Yessenia Sibley MD    "

## 2024-05-10 NOTE — ED PROVIDER NOTES
HPI   Chief Complaint   Patient presents with    Altered Mental Status       Patient presents with altered mental status when she is oriented x 4 per her report.  She is oriented x 2 here.  She is correct on the year but states that it is January.  She denies any pain today.  States she occasionally has back pain but currently denies this.  Denies any fall or injury.  Denies any fever, cough, vomiting but again history is limited mental status.      History limited by:  Mental status change                      No data recorded                   Patient History   Past Medical History:   Diagnosis Date    Age-related nuclear cataract, left eye 04/04/2016    Age-related nuclear cataract of left eye    Age-related nuclear cataract, right eye 04/04/2016    Age-related nuclear cataract of right eye    Anxiety     Arthritis     Conjunctival cysts, left eye 10/02/2015    Conjunctival cyst of left eye    Cortical age-related cataract, left eye 10/02/2015    Cortical age-related cataract of left eye    Cortical age-related cataract, right eye 10/08/2015    Cortical age-related cataract of right eye    Diabetes mellitus (Multi)     Disease of thyroid gland     Dry eye syndrome of bilateral lacrimal glands     Dry eyes    Encounter for general adult medical examination without abnormal findings 09/26/2017    Preventative health care    Encounter for other procedures for purposes other than remedying health state 10/13/2015    Prophylactic measure    Glaucoma     Meibomian gland dysfunction right eye, upper and lower eyelids 08/30/2022    Meibomian gland dysfunction (MGD) of upper and lower lids of both eyes    Other conditions influencing health status     Denial Of Any Significant Medical History    Other specified anxiety disorders 05/20/2021    Depression with anxiety    Pain in unspecified hip 07/10/2020    Painful hip    Pain in unspecified hip 07/02/2019    Painful hip    Pain in unspecified knee 06/09/2021    Knee pain     Parkinson's disease (Multi)     Personal history of malignant melanoma of skin 04/04/2016    History of malignant melanoma    Personal history of other diseases of the musculoskeletal system and connective tissue 06/03/2018    History of neck pain    Personal history of other diseases of the nervous system and sense organs     History of cataract    Personal history of other diseases of urinary system     History of hematuria    Personal history of other drug therapy 08/19/2021    History of drug therapy    Personal history of other endocrine, nutritional and metabolic disease 05/20/2021    History of elevated lipids    Personal history of other endocrine, nutritional and metabolic disease 04/04/2016    History of diabetes mellitus    Personal history of other infectious and parasitic diseases 05/05/2017    History of candidiasis of mouth    Personal history of other mental and behavioral disorders 04/04/2016    History of anxiety    Persons encountering health services in other specified circumstances 05/20/2021    Encounter to establish care with new doctor    Preglaucoma, unspecified, bilateral 07/30/2015    Glaucoma suspect of both eyes    Preglaucoma, unspecified, bilateral 04/04/2016    Glaucoma suspect of both eyes    Preglaucoma, unspecified, bilateral 08/03/2015    Glaucoma suspect of both eyes    Presence of intraocular lens 05/07/2018    Pseudophakia of left eye    Unspecified blepharitis left eye, unspecified eyelid 04/04/2016    Blepharitis of left eye    Unspecified blepharitis right eye, unspecified eyelid 04/04/2016    Blepharitis of right eye     Past Surgical History:   Procedure Laterality Date    APPENDECTOMY  04/04/2016    Appendectomy    BELT ABDOMINOPLASTY  04/04/2016    Abdominoplasty    CATARACT EXTRACTION  04/04/2016    Cataract Surgery    OTHER SURGICAL HISTORY  07/01/2022    Knee replacement    TOTAL THYROIDECTOMY  04/04/2016    Thyroid Surgery Total Thyroidectomy     Family History    Problem Relation Name Age of Onset    Breast cancer Mother      Accidental death Father      Other (CVA) Brother       Social History     Tobacco Use    Smoking status: Never    Smokeless tobacco: Never   Vaping Use    Vaping status: Never Used   Substance Use Topics    Alcohol use: Not Currently    Drug use: Never       Physical Exam   ED Triage Vitals [05/09/24 2000]   Temperature Heart Rate Respirations BP   35.8 °C (96.4 °F) 95 16 163/82      Pulse Ox Temp Source Heart Rate Source Patient Position   95 % Temporal Monitor Sitting      BP Location FiO2 (%)     Left arm --       Physical Exam  Vitals and nursing note reviewed.   Constitutional:       General: She is not in acute distress.     Appearance: She is well-developed.   HENT:      Head: Normocephalic and atraumatic.   Eyes:      General: No visual field deficit.     Conjunctiva/sclera: Conjunctivae normal.   Cardiovascular:      Rate and Rhythm: Normal rate and regular rhythm.      Heart sounds: No murmur heard.  Pulmonary:      Effort: Pulmonary effort is normal. No respiratory distress.      Breath sounds: Normal breath sounds.   Abdominal:      Palpations: Abdomen is soft.      Tenderness: There is no abdominal tenderness.   Musculoskeletal:         General: No swelling.      Cervical back: Neck supple.   Skin:     General: Skin is warm and dry.      Capillary Refill: Capillary refill takes less than 2 seconds.   Neurological:      Mental Status: She is alert.      GCS: GCS eye subscore is 4. GCS verbal subscore is 4. GCS motor subscore is 6.      Cranial Nerves: No cranial nerve deficit, dysarthria or facial asymmetry.   Psychiatric:         Mood and Affect: Mood normal.         ED Course & MDM   Diagnoses as of 05/16/24 0550   Altered mental status, unspecified altered mental status type       Medical Decision Making  Patient was recently here with concern for possible opiate or alcohol overuse.  Considered UTI.  Patient is not giving urine here.   Patient is wandering the hallways.  Given that she was abnormal acting on her last visit I feel that she may no longer be safe for assisted living.  Will admit for observation at this time.  Possible placement.     EKG interpreted by myself.  Normal sinus rhythm at a rate of 93 bpm.  Normal intervals.  Normal axis.  No signs of acute ischemia.      Procedure  Procedures     Charles English MD  05/10/24 0049       Charles English MD  05/16/24 0550

## 2024-05-10 NOTE — CARE PLAN
The patient's goals for the shift include  pain control.    The clinical goals for the shift include Pt will remain hemodynamically stable    Over the shift, the patient did not make progress toward the following goals. Barriers to progression include NA. Recommendations to address these barriers include NA.

## 2024-05-10 NOTE — NURSING NOTE
"Patient awaken and jumped out of bed screaming, yelling, and swinging her arms. I the nurse asked patient \"do you remember where you are\" Patient ignored my question and asked me, \"please don't comfort me, Im tried of getting hurt\". This was able redirect back to bed approximally 5 minutes of reminding patient where she was. Patient now have a sitter due to impulsive behavior and being hard to redirect while receiving IV therapy.   "

## 2024-05-10 NOTE — PROGRESS NOTES
[facility]     05/10/24 0950   Current Planned Discharge Disposition   Current Planned Discharge Disposition Home

## 2024-05-10 NOTE — PROGRESS NOTES
05/10/24 1359   Discharge Planning   Living Arrangements Alone   Support Systems Children   Care Facility Name [facility]   Home or Post Acute Services Post acute facilities (Rehab/SNF/etc)   Type of Post Acute Facility Services Assisted living   [facility] to [facility]     Left VM with patient's daughterAllison ( ) with my name and phone number and asked for return call to discuss discharge plans.  Attempted numerous times to call [facility] ( [phone])--press 2 for all other inquiries and then the phone disconnects.   Will attempt to try again.   Sent return referral to [facility] with no response and see they are OFFLINE.     1410 DaughterAllison returned my call--she confirmed patient will return to [facility].  [place] is from [place] but they are working with the facility to get more help for patient.  Patient started pt last week and was given a walker.  She was not using one prior to that.  Family will be at the hospital around 430 today--updates patient's nurse Hacel.

## 2024-05-10 NOTE — PROGRESS NOTES
Transitional Care Coordination Progress Note:  Plan per Medical/Surgical team: treatment of UTI with IV ATB, IV fluids  Status: Observation  Payor source: medicare A/B, [place]  Discharge disposition: [facility]  Potential Barriers: confusion  ADOD: 5/11/2024  JOAQUIN Gomez RN, BSN Transitional Care Coordinator ED# [phone]      05/10/24 0950   Discharge Planning   Living Arrangements Alone   Support Systems Children   Assistance Needed IV ATB, IV fluids   Type of Residence Assisted living   Do you have animals or pets at home? No   Care Facility Name [facility]   Home or Post Acute Services Post acute facilities (Rehab/SNF/etc)   Type of Post Acute Facility Services Assisted living   Patient expects to be discharged to: [facility]   Does the patient need discharge transport arranged? Yes   RoundTrip coordination needed? Yes   Has discharge transport been arranged? No   Financial Resource Strain   How hard is it for you to pay for the very basics like food, housing, medical care, and heating? Not hard   Housing Stability   In the last 12 months, was there a time when you were not able to pay the mortgage or rent on time? N   In the last 12 months, how many places have you lived? 1   In the last 12 months, was there a time when you did not have a steady place to sleep or slept in a shelter (including now)? N   Transportation Needs   In the past 12 months, has lack of transportation kept you from medical appointments or from getting medications? no   In the past 12 months, has lack of transportation kept you from meetings, work, or from getting things needed for daily living? No

## 2024-05-10 NOTE — ED TRIAGE NOTES
Pt came in via EMS for AMS change. EMS stated that she is normally A&OX4 but she is now A&OX2. Last know well is this morning. Negative stroke after Dr. Schneider exam ed her.

## 2024-05-11 VITALS
HEIGHT: 60 IN | HEART RATE: 81 BPM | WEIGHT: 103 LBS | OXYGEN SATURATION: 96 % | SYSTOLIC BLOOD PRESSURE: 143 MMHG | TEMPERATURE: 96.8 F | RESPIRATION RATE: 16 BRPM | DIASTOLIC BLOOD PRESSURE: 79 MMHG | BODY MASS INDEX: 20.22 KG/M2

## 2024-05-11 LAB — BACTERIA UR CULT: NORMAL

## 2024-05-11 PROCEDURE — 2500000001 HC RX 250 WO HCPCS SELF ADMINISTERED DRUGS (ALT 637 FOR MEDICARE OP): Performed by: HOSPITALIST

## 2024-05-11 PROCEDURE — 96372 THER/PROPH/DIAG INJ SC/IM: CPT | Performed by: HOSPITALIST

## 2024-05-11 PROCEDURE — G0378 HOSPITAL OBSERVATION PER HR: HCPCS

## 2024-05-11 PROCEDURE — 2500000001 HC RX 250 WO HCPCS SELF ADMINISTERED DRUGS (ALT 637 FOR MEDICARE OP): Performed by: STUDENT IN AN ORGANIZED HEALTH CARE EDUCATION/TRAINING PROGRAM

## 2024-05-11 PROCEDURE — 2500000004 HC RX 250 GENERAL PHARMACY W/ HCPCS (ALT 636 FOR OP/ED): Performed by: HOSPITALIST

## 2024-05-11 PROCEDURE — 2500000006 HC RX 250 W HCPCS SELF ADMINISTERED DRUGS (ALT 637 FOR ALL PAYERS): Performed by: STUDENT IN AN ORGANIZED HEALTH CARE EDUCATION/TRAINING PROGRAM

## 2024-05-11 PROCEDURE — 99239 HOSP IP/OBS DSCHRG MGMT >30: CPT | Performed by: STUDENT IN AN ORGANIZED HEALTH CARE EDUCATION/TRAINING PROGRAM

## 2024-05-11 PROCEDURE — 96366 THER/PROPH/DIAG IV INF ADDON: CPT | Mod: 59

## 2024-05-11 RX ORDER — PREGABALIN 75 MG/1
75 CAPSULE ORAL DAILY
Qty: 90 CAPSULE | Refills: 0 | Status: SHIPPED | OUTPATIENT
Start: 2024-05-11

## 2024-05-11 RX ORDER — SULFAMETHOXAZOLE AND TRIMETHOPRIM 800; 160 MG/1; MG/1
1 TABLET ORAL 2 TIMES DAILY
Qty: 2 TABLET | Refills: 0 | Status: SHIPPED | OUTPATIENT
Start: 2024-05-11

## 2024-05-11 RX ORDER — VALSARTAN 160 MG/1
160 TABLET ORAL DAILY
Qty: 90 TABLET | Refills: 0 | Status: SHIPPED | OUTPATIENT
Start: 2024-05-11

## 2024-05-11 RX ADMIN — PREGABALIN 100 MG: 100 CAPSULE ORAL at 08:58

## 2024-05-11 RX ADMIN — ENOXAPARIN SODIUM 40 MG: 40 INJECTION SUBCUTANEOUS at 08:59

## 2024-05-11 RX ADMIN — VALSARTAN 160 MG: 80 TABLET, FILM COATED ORAL at 08:59

## 2024-05-11 RX ADMIN — CARBIDOPA AND LEVODOPA 1 TABLET: 25; 100 TABLET ORAL at 08:58

## 2024-05-11 RX ADMIN — DULOXETINE HYDROCHLORIDE 30 MG: 30 CAPSULE, DELAYED RELEASE ORAL at 08:58

## 2024-05-11 RX ADMIN — PANTOPRAZOLE SODIUM 40 MG: 40 TABLET, DELAYED RELEASE ORAL at 06:56

## 2024-05-11 RX ADMIN — DORZOLAMIDE HYDROCHLORIDE AND TIMOLOL MALEATE 1 DROP: 22.3; 6.8 SOLUTION/ DROPS OPHTHALMIC at 09:00

## 2024-05-11 RX ADMIN — BRIMONIDINE TARTRATE 1 DROP: 2 SOLUTION/ DROPS OPHTHALMIC at 09:00

## 2024-05-11 RX ADMIN — CEFTRIAXONE SODIUM 1 G: 1 INJECTION, SOLUTION INTRAVENOUS at 03:40

## 2024-05-11 RX ADMIN — ASPIRIN 81 MG: 81 TABLET, COATED ORAL at 08:58

## 2024-05-11 RX ADMIN — POLYETHYLENE GLYCOL 3350 17 G: 17 POWDER, FOR SOLUTION ORAL at 08:58

## 2024-05-11 ASSESSMENT — ENCOUNTER SYMPTOMS
ABDOMINAL DISTENTION: 0
ABDOMINAL PAIN: 0
FEVER: 0
VOMITING: 0
SHORTNESS OF BREATH: 0

## 2024-05-11 ASSESSMENT — PAIN SCALES - WONG BAKER
WONGBAKER_NUMERICALRESPONSE: NO HURT
WONGBAKER_NUMERICALRESPONSE: NO HURT

## 2024-05-11 ASSESSMENT — COGNITIVE AND FUNCTIONAL STATUS - GENERAL
HELP NEEDED FOR BATHING: A LITTLE
DAILY ACTIVITIY SCORE: 22
CLIMB 3 TO 5 STEPS WITH RAILING: A LITTLE
MOBILITY SCORE: 23
DRESSING REGULAR UPPER BODY CLOTHING: A LITTLE

## 2024-05-11 ASSESSMENT — PAIN SCALES - GENERAL
PAINLEVEL_OUTOF10: 0 - NO PAIN
PAINLEVEL_OUTOF10: 0 - NO PAIN

## 2024-05-11 NOTE — DISCHARGE SUMMARY
[facility] Hospitalist Discharge Summary        Lewis Graham: 1935MRN: [id number]    ADMIT DATE: ISCHARGE DATE: 2024    PRIMARYCARE PHYSICIAN: Nilesh Carlos MD    VISIT STATUS: Inpatient    CODE STATUS: Full Code    DISCHARGE DIAGNOSES:    Principal Problem:    Altered mental status      CONSULTANTS:  None    HOSPITAL COURSE:    Admitted for altered mental status in setting of dementia. Possibly related to dehydration from hydrochlorothiazide, medications like lyrica/tramadol (uses for back pain), or uncontrolled pain. Discussed with daughter, we agreed to lower lyrica dosage and discontinue hydrochlorothiazide. We also are treating a possible UTI, patient will complete 1 more day of bactrim. Discussed all this with daughter Karishma, patient will also follow up with geriatric team. Patient DC back to independent living in stable medical condition.     DAY OF DISCHARGE:  Review of Systems   Constitutional:  Negative for fever.   Respiratory:  Negative for shortness of breath.    Cardiovascular:  Negative for chest pain.   Gastrointestinal:  Negative for abdominal distention, abdominal pain and vomiting.       Patient Vitals for the past 24 hrs:   BP Temp Temp src Pulse Resp SpO2   24 0758 132/80 36 °C (96.8 °F) Temporal 77 18 96 %   24 0446 158/85 36.1 °C (96.9 °F) Temporal 70 18 95 %   24 0005 124/77 36.4 °C (97.6 °F) Temporal 74 17 96 %   05/10/24 2213 136/72 36.1 °C (97 °F) Temporal 94 17 96 %   05/10/24 1558 (!) 183/106 35.5 °C (95.9 °F) Temporal 93 16 96 %   05/10/24 1231 (!) 164/94 36.4 °C (97.5 °F) Temporal 88 18 95 %       Average, Min, and Max forlast 24 hours Vitals:  TEMPERATURE: Temp  Av.1 °C (97 °F)  Min: 35.5 °C (95.9 °F)  Max: 36.4 °C (97.6 °F)    RESPIRATIONS RANGE: Resp  Av.3  Min: 16  Max: 18    PULSE RANGE: Pulse  Av.7  Min: 70  Max: 94    BLOOD PRESSURE RANGE: Systolic (24hrs), Av , Min:124 , Max:183   ; Diastolic (24hrs), Av, Min:72,  Max:106      PULSE OXIMETRYRANGE: SpO2  Av.7 %  Min: 95 %  Max: 96 %    I/O last 3 completed shifts:  In: 1.3 (43.7 mL/kg) [I.V.:941.3 (20.1 mL/kg); IV Piggyback:1100]  Out: - (0 mL/kg)   Weight: 46.7 kg     Physical Exam  Constitutional:       General: She is not in acute distress.  Cardiovascular:      Rate and Rhythm: Normal rate and regular rhythm.   Pulmonary:      Effort: Pulmonary effort is normal.      Breath sounds: Normal breath sounds.   Abdominal:      General: There is no distension.      Palpations: Abdomen is soft.   Neurological:      Mental Status: She is alert. Mental status is at baseline.           Discharge Meds       Your medication list        START taking these medications        Instructions Last Dose Given Next Dose Due   sulfamethoxazole-trimethoprim 800-160 mg tablet  Commonly known as: Bactrim DS      Take 1 tablet by mouth 2 times a day.              CHANGE how you take these medications        Instructions Last Dose Given Next Dose Due   pregabalin 75 mg capsule  Commonly known as: Lyrica  What changed:   medication strength  how much to take      Take 1 capsule (75 mg) by mouth once daily.       valsartan 160 mg tablet  Commonly known as: Diovan  What changed:   medication strength  how much to take      Take 1 tablet (160 mg) by mouth once daily.              CONTINUE taking these medications        Instructions Last Dose Given Next Dose Due   acetaminophen 500 mg tablet  Commonly known as: Tylenol           ammonium lactate 12 % cream  Commonly known as: Amlactin           aspirin 81 mg EC tablet           brimonidine 0.2 % ophthalmic solution  Commonly known as: AlphaGAN      ADMINISTER 1 DROP INTO BOTH EYES TWICE DAILY       carbidopa-levodopa  mg tablet  Commonly known as: Sinemet           dorzolamide-timoloL 22.3-6.8 mg/mL ophthalmic solution  Commonly known as: Cosopt      Administer 1 drop into both eyes 2 times a day.       DULoxetine 30 mg DR capsule  Commonly  known as: Cymbalta      TAKE 1 CAPSULE (30 MG) BY MOUTH ONCE DAILY. DO NOT CRUSH OR CHEW.       levothyroxine 75 mcg tablet  Commonly known as: Synthroid, Levoxyl      TAKE 1 TABLET BY MOUTH EVERY DAY TAKE 6 DAYS PER WEEK ONLY       omeprazole OTC 20 mg EC tablet  Commonly known as: PriLOSEC OTC           traMADol 50 mg tablet  Commonly known as: Ultram      Take 1 tablet (50 mg) by mouth 3 times a day as needed for moderate pain (4 - 6).       triamcinolone 0.1 % cream  Commonly known as: Kenalog                  STOP taking these medications      hydroCHLOROthiazide 25 mg tablet  Commonly known as: HYDRODiuril                  Where to Get Your Medications        These medications were sent to [facility]/pharmacy #[id number] - [place] - [address]  [address] [place] [place] [address]      Phone: [phone]   pregabalin 75 mg capsule  sulfamethoxazole-trimethoprim 800-160 mg tablet  valsartan 160 mg tablet           FOLLOW UP TESTING, PENDING RESULTS OR REFERRALS AT FOLLOW UP VISIT:   [X] Yes as outlined above   [ ] No    DISPOSITION: [place]    FACILITY NAME: [facility]    Follow up with PCP Nilesh Carlos MD    Outpatient Follow-Up Appointments  Future Appointments   Date Time Provider Department [place]   6/6/2024  1:30 PM KEMAR Prado-CNP [id number] [facility]   6/12/2024  1:40 PM Sagar Gabriel DO [id number] [facility]   6/17/2024  3:30 PM Neville Markham MD [id number] [facility]   9/17/2024  2:00 PM Chiquis Laughlin MD [id number] [facility]   10/15/2024  1:00 PM Saul Mckeon [id number] [id number] personally examined the patient and reviewed chart.  Plan of care was discussed with patient and daughter Karishma, all questions answered.    DISCHARGE TIME: > 30 minutes providing counseling or in coordination of care. Total time on this day of visit includes record and documentation review before and after visit including documentation and time not explicitly included on EMR time  areli.    Daniel Thapa MD  [facility]

## 2024-05-11 NOTE — CARE PLAN
The patient's goals for the shift include      The clinical goals for the shift include remain hemodynamically stable    Over the shift, the patient did not make progress toward the following goals.   Problem: Nutrition  Goal: Less than 5 days NPO/clear liquids  Outcome: Progressing  Goal: Oral intake greater than 50%  Outcome: Progressing  Goal: Oral intake greater 75%  Outcome: Progressing  Goal: Consume prescribed supplement  Outcome: Progressing  Goal: Adequate PO fluid intake  Outcome: Progressing  Goal: Nutrition support goals are met within 48 hrs  Outcome: Progressing  Goal: Nutrition support is meeting 75% of nutrient needs  Outcome: Progressing  Goal: Tube feed tolerance  Outcome: Progressing  Goal: BG  mg/dL  Outcome: Progressing  Goal: Lab values WNL  Outcome: Progressing  Goal: Electrolytes WNL  Outcome: Progressing  Goal: Maintain stable weight  Outcome: Progressing  Goal: Reduce weight from edema/fluid  Outcome: Progressing  Goal: Gradual weight gain  Outcome: Progressing     Problem: Pain - Adult  Goal: Verbalizes/displays adequate comfort level or baseline comfort level  Outcome: Progressing     Problem: Safety - Adult  Goal: Free from fall injury  Outcome: Progressing     Problem: Discharge Planning  Goal: Discharge to home or other facility with appropriate resources  Outcome: Progressing     Problem: Chronic Conditions and Co-morbidities  Goal: Patient's chronic conditions and co-morbidity symptoms are monitored and maintained or improved  Outcome: Progressing     Problem: Diabetes  Goal: Achieve decreasing blood glucose levels by end of shift  Outcome: Progressing  Goal: Increase stability of blood glucose readings by end of shift  Outcome: Progressing  Goal: Decrease in ketones present in urine by end of shift  Outcome: Progressing  Goal: Maintain electrolyte levels within acceptable range throughout shift  Outcome: Progressing  Goal: Maintain glucose levels >70mg/dl to <250mg/dl  throughout shift  Outcome: Progressing  Goal: No changes in neurological exam by end of shift  Outcome: Progressing  Goal: Learn about and adhere to nutrition recommendations by end of shift  Outcome: Progressing  Goal: Vital signs within normal range for age by end of shift  Outcome: Progressing  Goal: Increase self care and/or family involovement by end of shift  Outcome: Progressing  Goal: Receive DSME education by end of shift  Outcome: Progressing     Problem: Pain  Goal: Takes deep breaths with improved pain control throughout the shift  Outcome: Progressing  Goal: Turns in bed with improved pain control throughout the shift  Outcome: Progressing  Goal: Walks with improved pain control throughout the shift  Outcome: Progressing  Goal: Performs ADL's with improved pain control throughout shift  Outcome: Progressing  Goal: Participates in PT with improved pain control throughout the shift  Outcome: Progressing  Goal: Free from opioid side effects throughout the shift  Outcome: Progressing  Goal: Free from acute confusion related to pain meds throughout the shift  Outcome: Progressing

## 2024-05-11 NOTE — CARE PLAN
The patient's goals for the shift include  be able to go home today    The clinical goals for the shift include Pt will remain free from injury    Over the shift, the patient did not make progress toward the following goals. Barriers to progression include NA. Recommendations to address these barriers include NA.

## 2024-05-20 DIAGNOSIS — E11.51 TYPE 2 DIABETES MELLITUS WITH DIABETIC PERIPHERAL ANGIOPATHY WITHOUT GANGRENE, WITHOUT LONG-TERM CURRENT USE OF INSULIN (MULTI): ICD-10-CM

## 2024-05-20 DIAGNOSIS — I10 BENIGN ESSENTIAL HYPERTENSION: ICD-10-CM

## 2024-05-20 DIAGNOSIS — F01.B0 VASCULAR DEMENTIA, MODERATE, WITHOUT BEHAVIORAL DISTURBANCE, PSYCHOTIC DISTURBANCE, MOOD DISTURBANCE, AND ANXIETY (MULTI): Primary | ICD-10-CM

## 2024-05-20 DIAGNOSIS — E11.51 TYPE 2 DIABETES MELLITUS WITH DIABETIC PERIPHERAL ANGIOPATHY WITHOUT GANGRENE, WITHOUT LONG-TERM CURRENT USE OF INSULIN (MULTI): Primary | ICD-10-CM

## 2024-06-06 ENCOUNTER — OFFICE VISIT (OUTPATIENT)
Dept: GERIATRIC MEDICINE | Facility: CLINIC | Age: 89
End: 2024-06-06
Payer: MEDICARE

## 2024-06-06 DIAGNOSIS — I10 BENIGN ESSENTIAL HYPERTENSION: ICD-10-CM

## 2024-06-06 DIAGNOSIS — G89.29 CHRONIC LOW BACK PAIN, UNSPECIFIED BACK PAIN LATERALITY, UNSPECIFIED WHETHER SCIATICA PRESENT: Primary | ICD-10-CM

## 2024-06-06 DIAGNOSIS — Z01.89 ENCOUNTER FOR GERIATRIC ASSESSMENT: ICD-10-CM

## 2024-06-06 DIAGNOSIS — G20.A2 PARKINSON'S DISEASE WITH FLUCTUATING MANIFESTATIONS, UNSPECIFIED WHETHER DYSKINESIA PRESENT (MULTI): ICD-10-CM

## 2024-06-06 DIAGNOSIS — M54.50 CHRONIC LOW BACK PAIN, UNSPECIFIED BACK PAIN LATERALITY, UNSPECIFIED WHETHER SCIATICA PRESENT: Primary | ICD-10-CM

## 2024-06-06 DIAGNOSIS — E86.0 DEHYDRATION: ICD-10-CM

## 2024-06-06 PROCEDURE — 1123F ACP DISCUSS/DSCN MKR DOCD: CPT | Performed by: NURSE PRACTITIONER

## 2024-06-06 PROCEDURE — 99214 OFFICE O/P EST MOD 30 MIN: CPT | Performed by: NURSE PRACTITIONER

## 2024-06-06 PROCEDURE — 1157F ADVNC CARE PLAN IN RCRD: CPT | Performed by: NURSE PRACTITIONER

## 2024-06-06 PROCEDURE — 1126F AMNT PAIN NOTED NONE PRSNT: CPT | Performed by: NURSE PRACTITIONER

## 2024-06-06 PROCEDURE — 3079F DIAST BP 80-89 MM HG: CPT | Performed by: NURSE PRACTITIONER

## 2024-06-06 PROCEDURE — 1159F MED LIST DOCD IN RCRD: CPT | Performed by: NURSE PRACTITIONER

## 2024-06-06 PROCEDURE — 3077F SYST BP >= 140 MM HG: CPT | Performed by: NURSE PRACTITIONER

## 2024-06-06 PROCEDURE — 1160F RVW MEDS BY RX/DR IN RCRD: CPT | Performed by: NURSE PRACTITIONER

## 2024-06-06 ASSESSMENT — ENCOUNTER SYMPTOMS
LOSS OF SENSATION IN FEET: 0
OCCASIONAL FEELINGS OF UNSTEADINESS: 0
DEPRESSION: 0

## 2024-06-06 ASSESSMENT — PATIENT HEALTH QUESTIONNAIRE - PHQ9
SUM OF ALL RESPONSES TO PHQ9 QUESTIONS 1 AND 2: 0
2. FEELING DOWN, DEPRESSED OR HOPELESS: NOT AT ALL
1. LITTLE INTEREST OR PLEASURE IN DOING THINGS: NOT AT ALL

## 2024-06-06 ASSESSMENT — PAIN SCALES - GENERAL: PAINLEVEL: 0-NO PAIN

## 2024-06-06 NOTE — PROGRESS NOTES
Subjective   Patient ID: Lewis Rose is a 89 y.o. female who presents for Follow-up.  Lewis Rose is an 89 y.o. female who presents today for geriatric follow-up. She is accompanied by a hired  who is also her . Patient transported to ER 5/9 due to complaint made by [place] neighbor who reported she was trying to break into/force her way into his home. Patient reports she was having trouble with her air conditioning and when her apartment became too warm she asked her neighbor if he would allow her dog to stay in his apartment while she contacted maintenance dept. Instead neighbor contacted [facility].  Patient was unaware this had occurred until EMS arrived at her apartment. Patient's BP was elevated and she was transported to ER. Patient stated her BP was elevated d/t her back pain. She reports BP will return to normal once increased pain begins to resolve. Which is what happen while she was in the ER. Patient continues to have episodic bouts of lower back back pain. She was ordered PT but has been in too pain to go to PT therapy session. Has used back massager on lower back. She has also tried lying flat on her back which helps more than anything else.      Review of Systems   Musculoskeletal:  Positive for back pain.       Objective   Visit Vitals  BP (!) 172/92   Pulse 73   Temp 36.6 °C (97.8 °F) (Tympanic)   Resp 16   Wt 47.4 kg (104 lb 6.4 oz)   BMI 20.39 kg/m²   OB Status Postmenopausal   Smoking Status Never   BSA 1.42 m²     Current Outpatient Medications on File Prior to Visit   Medication Sig Dispense Refill    acetaminophen (Tylenol) 500 mg tablet Take 1 tablet (500 mg) by mouth every 6 hours if needed for mild pain (1 - 3).      ammonium lactate (Amlactin) 12 % cream Apply topically if needed.      aspirin 81 mg EC tablet Take 1 tablet (81 mg) by mouth once daily.      brimonidine (AlphaGAN) 0.2 % ophthalmic solution ADMINISTER 1 DROP INTO BOTH EYES TWICE DAILY 30 mL 1     carbidopa-levodopa (Sinemet)  mg tablet Take 1 tablet by mouth 3 times a day.      dorzolamide-timoloL (Cosopt) 22.3-6.8 mg/mL ophthalmic solution Administer 1 drop into both eyes 2 times a day. 30 mL 1    DULoxetine (Cymbalta) 30 mg DR capsule TAKE 1 CAPSULE (30 MG) BY MOUTH ONCE DAILY. DO NOT CRUSH OR CHEW. 90 capsule 1    levothyroxine (Synthroid, Levoxyl) 75 mcg tablet TAKE 1 TABLET BY MOUTH EVERY DAY TAKE 6 DAYS PER WEEK ONLY 90 tablet 1    omeprazole OTC (PriLOSEC OTC) 20 mg EC tablet Take 1 tablet (20 mg) by mouth once daily in the morning. Take before meals. Do not crush, chew, or split.      pregabalin (Lyrica) 75 mg capsule Take 1 capsule (75 mg) by mouth once daily. 90 capsule 0    traMADol (Ultram) 50 mg tablet Take 1 tablet (50 mg) by mouth 3 times a day as needed for moderate pain (4 - 6). 90 tablet 1    triamcinolone (Kenalog) 0.1 % cream       valsartan (Diovan) 160 mg tablet Take 1 tablet (160 mg) by mouth once daily. 90 tablet 0    [DISCONTINUED] sulfamethoxazole-trimethoprim (Bactrim DS) 800-160 mg tablet Take 1 tablet by mouth 2 times a day. (Patient not taking: Reported on 6/12/2024) 2 tablet 0     No current facility-administered medications on file prior to visit.     Recent Results (from the past 2688 hour(s))   CBC and Auto Differential    Collection Time: 03/07/24  4:15 PM   Result Value Ref Range    WBC 6.0 4.4 - 11.3 x10*3/uL    nRBC 0.0 0.0 - 0.0 /100 WBCs    RBC 4.11 4.00 - 5.20 x10*6/uL    Hemoglobin 12.7 12.0 - 16.0 g/dL    Hematocrit 38.9 36.0 - 46.0 %    MCV 95 80 - 100 fL    MCH 30.9 26.0 - 34.0 pg    MCHC 32.6 32.0 - 36.0 g/dL    RDW 11.9 11.5 - 14.5 %    Platelets 284 150 - 450 x10*3/uL    Neutrophils % 55.1 40.0 - 80.0 %    Immature Granulocytes %, Automated 0.5 0.0 - 0.9 %    Lymphocytes % 35.3 13.0 - 44.0 %    Monocytes % 7.6 2.0 - 10.0 %    Eosinophils % 1.0 0.0 - 6.0 %    Basophils % 0.5 0.0 - 2.0 %    Neutrophils Absolute 3.28 1.60 - 5.50 x10*3/uL    Immature  Granulocytes Absolute, Automated 0.03 0.00 - 0.50 x10*3/uL    Lymphocytes Absolute 2.10 0.80 - 3.00 x10*3/uL    Monocytes Absolute 0.45 0.05 - 0.80 x10*3/uL    Eosinophils Absolute 0.06 0.00 - 0.40 x10*3/uL    Basophils Absolute 0.03 0.00 - 0.10 x10*3/uL   CBC and Auto Differential    Collection Time: 04/21/24  4:11 AM   Result Value Ref Range    WBC 9.6 4.4 - 11.3 x10*3/uL    RBC 4.70 4.00 - 5.20 x10*6/uL    Hemoglobin 14.5 12.0 - 16.0 g/dL    Hematocrit 41.3 36.0 - 46.0 %    MCV 88 80 - 100 fL    MCH 30.9 26.0 - 34.0 pg    MCHC 35.1 32.0 - 36.0 g/dL    RDW 11.9 11.5 - 14.5 %    Platelets 275 150 - 450 x10*3/uL    MPV 9.0 7.5 - 11.5 fL    Neutrophils % 77.2 40.0 - 80.0 %    Immature Granulocytes %, Automated 0.3 0.0 - 0.9 %    Lymphocytes % 16.5 13.0 - 44.0 %    Monocytes % 5.4 2.0 - 10.0 %    Eosinophils % 0.2 0.0 - 6.0 %    Basophils % 0.4 0.0 - 2.0 %    Neutrophils Absolute 7.38 (H) 1.60 - 5.50 x10*3/uL    Immature Granulocytes Absolute, Automated 0.03 0.00 - 0.50 x10*3/uL    Lymphocytes Absolute 1.58 0.80 - 3.00 x10*3/uL    Monocytes Absolute 0.52 0.05 - 0.80 x10*3/uL    Eosinophils Absolute 0.02 0.00 - 0.40 x10*3/uL    Basophils Absolute 0.04 0.00 - 0.10 x10*3/uL   Comprehensive metabolic panel    Collection Time: 04/21/24  4:11 AM   Result Value Ref Range    Glucose 123 (H) 74 - 99 mg/dL    Sodium 130 (L) 136 - 145 mmol/L    Potassium 3.6 3.5 - 5.3 mmol/L    Chloride 92 (L) 98 - 107 mmol/L    Bicarbonate 22 21 - 32 mmol/L    Anion Gap 20 10 - 20 mmol/L    Urea Nitrogen 22 6 - 23 mg/dL    Creatinine 0.81 0.50 - 1.05 mg/dL    eGFR 70 >60 mL/min/1.73m*2    Calcium 9.3 8.6 - 10.3 mg/dL    Albumin 4.3 3.4 - 5.0 g/dL    Alkaline Phosphatase 86 33 - 136 U/L    Total Protein 7.0 6.4 - 8.2 g/dL    AST 17 9 - 39 U/L    Bilirubin, Total 0.9 0.0 - 1.2 mg/dL    ALT 14 7 - 45 U/L   Troponin I, High Sensitivity    Collection Time: 04/21/24  4:11 AM   Result Value Ref Range    Troponin I, High Sensitivity 16 (H) 0 - 13 ng/L    Sars-CoV-2 and Influenza A/B PCR    Collection Time: 04/21/24  4:11 AM   Result Value Ref Range    Flu A Result Not Detected Not Detected    Flu B Result Not Detected Not Detected    Coronavirus 2019, PCR Not Detected Not Detected   Acute Toxicology Panel, Blood    Collection Time: 04/21/24  4:11 AM   Result Value Ref Range    Acetaminophen <10.0 10.0 - 30.0 ug/mL    Salicylate  <3 4 - 20 mg/dL    Alcohol <10 <=10 mg/dL   Troponin I, High Sensitivity    Collection Time: 04/21/24  5:54 AM   Result Value Ref Range    Troponin I, High Sensitivity 22 (H) 0 - 13 ng/L   CBC and Auto Differential    Collection Time: 05/09/24  8:28 PM   Result Value Ref Range    WBC 7.8 4.4 - 11.3 x10*3/uL    nRBC 0.0 0.0 - 0.0 /100 WBCs    RBC 4.62 4.00 - 5.20 x10*6/uL    Hemoglobin 14.6 12.0 - 16.0 g/dL    Hematocrit 42.8 36.0 - 46.0 %    MCV 93 80 - 100 fL    MCH 31.6 26.0 - 34.0 pg    MCHC 34.1 32.0 - 36.0 g/dL    RDW 12.7 11.5 - 14.5 %    Platelets 325 150 - 450 x10*3/uL    Neutrophils % 75.6 40.0 - 80.0 %    Immature Granulocytes %, Automated 0.3 0.0 - 0.9 %    Lymphocytes % 17.6 13.0 - 44.0 %    Monocytes % 5.8 2.0 - 10.0 %    Eosinophils % 0.4 0.0 - 6.0 %    Basophils % 0.3 0.0 - 2.0 %    Neutrophils Absolute 5.88 (H) 1.60 - 5.50 x10*3/uL    Immature Granulocytes Absolute, Automated 0.02 0.00 - 0.50 x10*3/uL    Lymphocytes Absolute 1.37 0.80 - 3.00 x10*3/uL    Monocytes Absolute 0.45 0.05 - 0.80 x10*3/uL    Eosinophils Absolute 0.03 0.00 - 0.40 x10*3/uL    Basophils Absolute 0.02 0.00 - 0.10 x10*3/uL   Comprehensive metabolic panel    Collection Time: 05/09/24  8:28 PM   Result Value Ref Range    Glucose 118 (H) 74 - 99 mg/dL    Sodium 134 (L) 136 - 145 mmol/L    Potassium 3.9 3.5 - 5.3 mmol/L    Chloride 96 (L) 98 - 107 mmol/L    Bicarbonate 25 21 - 32 mmol/L    Anion Gap 17 10 - 20 mmol/L    Urea Nitrogen 19 6 - 23 mg/dL    Creatinine 0.93 0.50 - 1.05 mg/dL    eGFR 60 (L) >60 mL/min/1.73m*2    Calcium 9.6 8.6 - 10.3 mg/dL     Albumin 4.5 3.4 - 5.0 g/dL    Alkaline Phosphatase 89 33 - 136 U/L    Total Protein 7.6 6.4 - 8.2 g/dL    AST 16 9 - 39 U/L    Bilirubin, Total 0.7 0.0 - 1.2 mg/dL    ALT 11 7 - 45 U/L   Sars-CoV-2 PCR    Collection Time: 05/09/24  8:28 PM   Result Value Ref Range    Coronavirus 2019, PCR Not Detected Not Detected   Troponin I, High Sensitivity    Collection Time: 05/09/24  8:28 PM   Result Value Ref Range    Troponin I, High Sensitivity 6 0 - 13 ng/L   Acute Toxicology Panel, Blood    Collection Time: 05/09/24  8:28 PM   Result Value Ref Range    Acetaminophen <10.0 10.0 - 30.0 ug/mL    Salicylate  <3 4 - 20 mg/dL    Alcohol <10 <=10 mg/dL   Ethanol    Collection Time: 05/09/24  8:28 PM   Result Value Ref Range    Alcohol <10 <=10 mg/dL   TSH    Collection Time: 05/09/24  8:28 PM   Result Value Ref Range    Thyroid Stimulating Hormone 1.61 0.44 - 3.98 mIU/L   ECG 12 lead    Collection Time: 05/09/24  8:43 PM   Result Value Ref Range    Ventricular Rate 93 BPM    Atrial Rate 93 BPM    [place] Interval 168 ms    QRS Duration 120 ms    QT Interval 400 ms    QTC Calculation(Bazett) 497 ms    P Axis 65 degrees    R Axis 8 degrees    T Axis 54 degrees    QRS Count 15 beats    Q Onset 220 ms    P Onset 136 ms    P Offset 192 ms    T Offset 420 ms    QTC Fredericia 463 ms   Urinalysis with Reflex Culture and Microscopic    Collection Time: 05/09/24 11:51 PM   Result Value Ref Range    Color, Urine Light-Yellow Light-Yellow, Yellow, Dark-Yellow    Appearance, Urine Clear Clear    Specific Gravity, Urine 1.011 1.005 - 1.035    pH, Urine 6.0 5.0, 5.5, 6.0, 6.5, 7.0, 7.5, 8.0    Protein, Urine NEGATIVE NEGATIVE, 10 (TRACE), 20 (TRACE) mg/dL    Glucose, Urine Normal Normal mg/dL    Blood, Urine NEGATIVE NEGATIVE    Ketones, Urine 20 (1+) (A) NEGATIVE mg/dL    Bilirubin, Urine NEGATIVE NEGATIVE    Urobilinogen, Urine Normal Normal mg/dL    Nitrite, Urine NEGATIVE NEGATIVE    Leukocyte Esterase, Urine 250 [place]/µL (A) NEGATIVE   Drug  Screen, Urine    Collection Time: 05/09/24 11:51 PM   Result Value Ref Range    Amphetamine Screen, Urine Presumptive Negative Presumptive Negative    Barbiturate Screen, Urine Presumptive Negative Presumptive Negative    Benzodiazepines Screen, Urine Presumptive Negative Presumptive Negative    Cannabinoid Screen, Urine Presumptive Negative Presumptive Negative    Cocaine Metabolite Screen, Urine Presumptive Negative Presumptive Negative    Fentanyl Screen, Urine Presumptive Negative Presumptive Negative    Opiate Screen, Urine Presumptive Negative Presumptive Negative    Oxycodone Screen, Urine Presumptive Negative Presumptive Negative    PCP Screen, Urine Presumptive Negative Presumptive Negative    Methadone Screen, Urine Presumptive Negative Presumptive Negative   Microscopic Only, Urine    Collection Time: 05/09/24 11:51 PM   Result Value Ref Range    WBC, Urine 6-10 (A) 1-5, NONE /HPF    RBC, Urine 1-2 NONE, 1-2, 3-5 /HPF    Squamous Epithelial Cells, Urine 1-9 (SPARSE) Reference range not established. /HPF    Bacteria, Urine 1+ (A) NONE SEEN /HPF   Urine Culture    Collection Time: 05/09/24 11:51 PM    Specimen: Clean Catch/Voided; Urine   Result Value Ref Range    Urine Culture Normal genitourinary kirstie    Drug Screen, Urine    Collection Time: 05/09/24 11:51 PM   Result Value Ref Range    Amphetamine Screen, Urine Presumptive Negative Presumptive Negative    Barbiturate Screen, Urine Presumptive Negative Presumptive Negative    Benzodiazepines Screen, Urine Presumptive Negative Presumptive Negative    Cannabinoid Screen, Urine Presumptive Negative Presumptive Negative    Cocaine Metabolite Screen, Urine Presumptive Negative Presumptive Negative    Fentanyl Screen, Urine Presumptive Negative Presumptive Negative    Opiate Screen, Urine Presumptive Negative Presumptive Negative    Oxycodone Screen, Urine Presumptive Negative Presumptive Negative    PCP Screen, Urine Presumptive Negative Presumptive Negative     Methadone Screen, Urine Presumptive Negative Presumptive Negative   CBC and Auto Differential    Collection Time: 05/10/24  4:27 AM   Result Value Ref Range    WBC 6.3 4.4 - 11.3 x10*3/uL    nRBC 0.0 0.0 - 0.0 /100 WBCs    RBC 4.29 4.00 - 5.20 x10*6/uL    Hemoglobin 13.3 12.0 - 16.0 g/dL    Hematocrit 40.8 36.0 - 46.0 %    MCV 95 80 - 100 fL    MCH 31.0 26.0 - 34.0 pg    MCHC 32.6 32.0 - 36.0 g/dL    RDW 12.8 11.5 - 14.5 %    Platelets 303 150 - 450 x10*3/uL    Neutrophils % 53.2 40.0 - 80.0 %    Immature Granulocytes %, Automated 0.3 0.0 - 0.9 %    Lymphocytes % 36.8 13.0 - 44.0 %    Monocytes % 8.6 2.0 - 10.0 %    Eosinophils % 0.3 0.0 - 6.0 %    Basophils % 0.8 0.0 - 2.0 %    Neutrophils Absolute 3.35 1.60 - 5.50 x10*3/uL    Immature Granulocytes Absolute, Automated 0.02 0.00 - 0.50 x10*3/uL    Lymphocytes Absolute 2.32 0.80 - 3.00 x10*3/uL    Monocytes Absolute 0.54 0.05 - 0.80 x10*3/uL    Eosinophils Absolute 0.02 0.00 - 0.40 x10*3/uL    Basophils Absolute 0.05 0.00 - 0.10 x10*3/uL   Basic metabolic panel    Collection Time: 05/10/24  4:27 AM   Result Value Ref Range    Glucose 85 74 - 99 mg/dL    Sodium 136 136 - 145 mmol/L    Potassium 3.8 3.5 - 5.3 mmol/L    Chloride 101 98 - 107 mmol/L    Bicarbonate 24 21 - 32 mmol/L    Anion Gap 15 10 - 20 mmol/L    Urea Nitrogen 16 6 - 23 mg/dL    Creatinine 0.79 0.50 - 1.05 mg/dL    eGFR 73 >60 mL/min/1.73m*2    Calcium 9.1 8.6 - 10.3 mg/dL   Magnesium    Collection Time: 05/10/24  4:27 AM   Result Value Ref Range    Magnesium 1.80 1.60 - 2.40 mg/dL   Hemoglobin A1C    Collection Time: 05/10/24  4:27 AM   Result Value Ref Range    Hemoglobin A1C 6.0 (H) see below %    Estimated Average Glucose 126 Not Established mg/dL       Physical Exam  Vitals (Elevated /92, retaken later in visit 166/84) reviewed.   Constitutional:       Appearance: Normal appearance.   HENT:      Head: Normocephalic.      Nose: Nose normal.   Cardiovascular:      Rate and Rhythm: Normal  rate and regular rhythm.   Abdominal:      General: Abdomen is flat.   Genitourinary:     Comments: Deferred  Musculoskeletal:         General: Normal range of motion.      Cervical back: Normal range of motion.   Skin:     General: Skin is warm.   Neurological:      Mental Status: She is alert. Mental status is at baseline.   Psychiatric:         Mood and Affect: Mood normal.         Assessment/Plan   Problem List Items Addressed This Visit             ICD-10-CM    Benign essential hypertension I10     Elevated /92, retaken later in visit 166/84. Patient attributes elevated BPs on increased lower back pain, and traffic on highway on the way to today's appointment.  Continue with valsartan (Diovan) 160 mg once daily         Chronic back pain - Primary M54.9, G89.29     Continues with acetaminophen 500 mg q6h PRN  Continues with duloxetine 30 mg  once daily  Continues with tramadol 30 mg q8h PRN         Relevant Orders    Follow Up In Geriatrics    Parkinsons disease (Multi) G20.A1     Continues with carbidopa-Levodopa  mgTID          Relevant Orders    Follow Up In Geriatrics    Encounter for geriatric assessment Z01.89    Dehydration E86.0     Assessed how much fluids patient is drinking: Less than 4 cups of water a day  Encouraged to drink 8 cups of water each day         Relevant Orders    Follow Up In Geriatrics            Time Spent  Prep time on day of patient encounter: 0 minutes  Time spent directly with patient, family or caregiver: 40 minutes  Additional Time Spent on Patient Care Activities: 0 minutes  Documentation Time: 30 minutes  Other Time Spent: 0 minutes  Total: 70 minutes      ERICA Prado 06/13/24 1:36 AM

## 2024-06-11 NOTE — PROGRESS NOTES
Subjective   Patient ID: Lewis Rose is a 88 y.o. female who presents for Osteoarthritis and Pain (FUV-Taking Tramadol 50 PRN and Extra Strength Tylenol PRN. Tox screen up to date. CSA due today. Also, would like to discuss Lyrica.).    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      ammonium lactate (Amlactin) 12 % cream Apply topically if needed.      aspirin 81 mg EC tablet Take 1 tablet (81 mg) by mouth once daily.      brimonidine (AlphaGAN) 0.2 % ophthalmic solution Administer 1 drop into both eyes 2 times a day. 30 mL 0    carbidopa-levodopa (Sinemet)  mg tablet Take 1 tablet by mouth 3 times a day.      DULoxetine (Cymbalta) 30 mg DR capsule Take 1 capsule (30 mg) by mouth once daily. Do not crush or chew.      hydroCHLOROthiazide (HYDRODiuril) 25 mg tablet TAKE 1 TABLET BY MOUTH EVERY DAY 90 tablet 1    levothyroxine (Synthroid, Levoxyl) 75 mcg tablet TAKE 1 TABLET BY MOUTH EVERY DAY TAKE 6 DAYS PER WEEK ONLY 90 tablet 1    omeprazole OTC (PriLOSEC OTC) 20 mg EC tablet Take 1 tablet (20 mg) by mouth once daily in the morning. Take before meals. Do not crush, chew, or split.      timolol (Timoptic) 0.25 % ophthalmic solution INSTILL 1 DROP IN BOTH EYES TWICE DAILY 10 mL 0    valsartan (Diovan) 80 mg tablet TAKE 1 TABLET BY MOUTH ONCE DAILY. 30  tablet 5    pregabalin (Lyrica) 150 mg capsule Take 1 capsule (150 mg) by mouth once daily. 90 capsule 1    [START ON 1/11/2024] traMADol (Ultram) 50 mg tablet Take 1 tablet (50 mg) by mouth 3 times a day as needed for moderate pain (4 - 6). Do not start before January 11, 2024. 90 tablet 1    triamcinolone (Kenalog) 0.1 % cream        No current facility-administered medications for this visit.         has a past medical history of Age-related nuclear cataract, left eye (04/04/2016), Age-related nuclear cataract, right eye (04/04/2016), Anxiety, Arthritis, Conjunctival cysts, left eye (10/02/2015), Cortical age-related cataract, left eye (10/02/2015), Cortical age-related cataract, right eye (10/08/2015), Diabetes mellitus (CMS/[facility]), Disease of thyroid gland, Dry eye syndrome of bilateral lacrimal glands, Encounter for general adult medical examination without abnormal findings (09/26/2017), Encounter for other procedures for purposes other than remedying health state (10/13/2015), Glaucoma, Meibomian gland dysfunction right eye, upper and lower eyelids (08/30/2022), Other conditions influencing health status, Other specified anxiety disorders (05/20/2021), Pain in unspecified hip (07/10/2020), Pain in unspecified hip (07/02/2019), Pain in unspecified knee (06/09/2021), Parkinson's disease, Personal history of malignant melanoma of skin (04/04/2016), Personal history of other diseases of the musculoskeletal system and connective tissue (06/03/2018), Personal history of other diseases of the nervous system and sense organs, Personal history of other diseases of urinary system, Personal history of other drug therapy (08/19/2021), Personal history of other endocrine, nutritional and metabolic disease (05/20/2021), Personal history of other endocrine, nutritional and metabolic disease (04/04/2016), Personal history of other infectious and parasitic diseases (05/05/2017), Personal history of other mental and behavioral  disorders (04/04/2016), Persons encountering health services in other specified circumstances (05/20/2021), Preglaucoma, unspecified, 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   Past Surgical History:   Procedure Laterality Date    APPENDECTOMY  04/04/2016    Appendectomy    BELT ABDOMINOPLASTY  04/04/2016    Abdominoplasty    CATARACT EXTRACTION  04/04/2016    Cataract Surgery    OTHER SURGICAL HISTORY  07/01/2022    Knee replacement    TOTAL THYROIDECTOMY  04/04/2016    Thyroid Surgery Total Thyroidectomy      Social History     Tobacco Use    Smoking status: Never    Smokeless tobacco: Never   Substance Use Topics    Alcohol use: Not Currently    Drug use: Never      family history includes Accidental death in her father; Breast cancer in her mother; CVA in her brother.  Pain Management Panel  More data exists         Latest Ref Rng & Units 8/31/2023 9/7/2022   Pain Management Panel   Amphetamine Screen, Urine NEGATIVE PRESUMPTIVE NEGATIVE  PRESUMPTIVE NEGATIVE    Barbiturate Screen, Urine NEGATIVE PRESUMPTIVE NEGATIVE  PRESUMPTIVE NEGATIVE    Codeine IgE Cutoff <50 ng/mL <50  <50    Hydromorphone Urine Cutoff <25 ng/mL <25  <25    Morphine  Cutoff <50 ng/mL <50  <50         Vitals:    12/11/23 1342   BP: 150/86   Pulse: 97     Lab Results   Component Value Date    TSH 1.35 08/22/2023       PHYSICAL EXAM      NODES   HEART  LUNGS  ABDOMEN   VASCULAR  NEURO   SKIN  JOINTS     PLAN  Diagnoses and all orders for this visit:  Generalized osteoarthritis of multiple sites  -     Drug Screen, Urine With Reflex to Confirmation; Future  -     pregabalin (Lyrica) 150 mg capsule; Take 1 capsule (150 mg) by mouth once daily.  -     traMADol (Ultram) 50 mg tablet;  Take 1 tablet (50 mg) by mouth 3 times a day as needed for moderate pain (4 - 6). Do not start before January 11, 2024.  Arthralgia, unspecified joint  -     Drug Screen, Urine With Reflex to Confirmation; Future    Routine follow-up for her chronic pain which includes generalized osteoarthritis and degenerative disc disease  Her Parkinson's is stable and her meds have been decreased because of potential side effect  Her PCP is no longer able to give her Lyrica so I will prescribe that for her.  She takes 150 mg/day I gave her a 3-month supply and she filled out a pain contract Lyrica has now been decreased to 75 mg daily and she states that it is no better with pain relief.  Her PCP is managing this and of course if it is not helpful she should slowly wean herself off of the Lyrica.  She will continue with her tramadol her urine tox screen is up-to-date   Her OARRS was reviewed and is okay  I will see her back in 3 months

## 2024-06-12 ENCOUNTER — APPOINTMENT (OUTPATIENT)
Dept: RHEUMATOLOGY | Facility: CLINIC | Age: 89
End: 2024-06-12
Payer: MEDICARE

## 2024-06-12 VITALS
OXYGEN SATURATION: 97 % | HEART RATE: 74 BPM | WEIGHT: 103.4 LBS | BODY MASS INDEX: 20.19 KG/M2 | SYSTOLIC BLOOD PRESSURE: 157 MMHG | DIASTOLIC BLOOD PRESSURE: 90 MMHG

## 2024-06-12 DIAGNOSIS — Z96.651 STATUS POST RIGHT KNEE REPLACEMENT: ICD-10-CM

## 2024-06-12 DIAGNOSIS — M15.9 GENERALIZED OSTEOARTHRITIS OF MULTIPLE SITES: Primary | ICD-10-CM

## 2024-06-12 DIAGNOSIS — M54.6 THORACIC SPINE PAIN: ICD-10-CM

## 2024-06-12 PROBLEM — Z20.822 CONTACT WITH AND (SUSPECTED) EXPOSURE TO COVID-19: Status: ACTIVE | Noted: 2022-12-06

## 2024-06-12 PROBLEM — Z86.69 HISTORY OF CATARACT: Status: ACTIVE | Noted: 2024-06-12

## 2024-06-12 PROBLEM — E78.5 ELEVATED LIPIDS: Status: ACTIVE | Noted: 2018-06-08

## 2024-06-12 PROBLEM — M25.50 ARTHRALGIA: Status: ACTIVE | Noted: 2024-06-12

## 2024-06-12 PROBLEM — H90.0 CONDUCTIVE HEARING LOSS, BILATERAL: Status: ACTIVE | Noted: 2024-06-12

## 2024-06-12 PROBLEM — R09.89 HEMODYNAMIC INSTABILITY: Status: ACTIVE | Noted: 2024-06-12

## 2024-06-12 PROBLEM — B02.9 HERPES ZOSTER: Status: ACTIVE | Noted: 2024-06-12

## 2024-06-12 PROBLEM — B37.0 CANDIDIASIS OF MOUTH: Status: ACTIVE | Noted: 2024-06-12

## 2024-06-12 PROBLEM — Z85.820 HISTORY OF MALIGNANT MELANOMA: Status: ACTIVE | Noted: 2024-06-12

## 2024-06-12 PROBLEM — K57.90 DIVERTICULAR DISEASE: Status: ACTIVE | Noted: 2024-06-12

## 2024-06-12 PROCEDURE — 1159F MED LIST DOCD IN RCRD: CPT | Performed by: INTERNAL MEDICINE

## 2024-06-12 PROCEDURE — 1157F ADVNC CARE PLAN IN RCRD: CPT | Performed by: INTERNAL MEDICINE

## 2024-06-12 PROCEDURE — 1036F TOBACCO NON-USER: CPT | Performed by: INTERNAL MEDICINE

## 2024-06-12 PROCEDURE — 1123F ACP DISCUSS/DSCN MKR DOCD: CPT | Performed by: INTERNAL MEDICINE

## 2024-06-12 PROCEDURE — 99214 OFFICE O/P EST MOD 30 MIN: CPT | Performed by: INTERNAL MEDICINE

## 2024-06-12 PROCEDURE — 3080F DIAST BP >= 90 MM HG: CPT | Performed by: INTERNAL MEDICINE

## 2024-06-12 PROCEDURE — 3077F SYST BP >= 140 MM HG: CPT | Performed by: INTERNAL MEDICINE

## 2024-06-12 PROCEDURE — 1126F AMNT PAIN NOTED NONE PRSNT: CPT | Performed by: INTERNAL MEDICINE

## 2024-06-12 RX ORDER — HYDROCHLOROTHIAZIDE 25 MG/1
1 TABLET ORAL
COMMUNITY
Start: 2024-06-03

## 2024-06-12 RX ORDER — CHOLECALCIFEROL (VITAMIN D3) 125 MCG
125 CAPSULE ORAL DAILY
COMMUNITY

## 2024-06-12 RX ORDER — AMLODIPINE BESYLATE 5 MG/1
5 TABLET ORAL DAILY
COMMUNITY

## 2024-06-12 ASSESSMENT — PAIN SCALES - GENERAL: PAINLEVEL: 0-NO PAIN

## 2024-06-16 VITALS
HEART RATE: 73 BPM | SYSTOLIC BLOOD PRESSURE: 166 MMHG | BODY MASS INDEX: 20.39 KG/M2 | RESPIRATION RATE: 16 BRPM | WEIGHT: 104.4 LBS | DIASTOLIC BLOOD PRESSURE: 84 MMHG | TEMPERATURE: 97.8 F

## 2024-06-16 PROBLEM — E86.0 DEHYDRATION: Status: ACTIVE | Noted: 2024-06-16

## 2024-06-16 ASSESSMENT — ENCOUNTER SYMPTOMS: BACK PAIN: 1

## 2024-06-16 NOTE — ASSESSMENT & PLAN NOTE
Elevated /92, retaken later in visit 166/84. Patient attributes elevated BPs on increased lower back pain, and traffic on highway on the way to today's appointment.  Continue with valsartan (Diovan) 160 mg once daily

## 2024-06-16 NOTE — ASSESSMENT & PLAN NOTE
Assessed how much fluids patient is drinking: Less than 4 cups of water a day  Encouraged to drink 8 cups of water each day

## 2024-06-17 ENCOUNTER — APPOINTMENT (OUTPATIENT)
Dept: GASTROENTEROLOGY | Facility: CLINIC | Age: 89
End: 2024-06-17
Payer: MEDICARE

## 2024-06-25 ENCOUNTER — TELEPHONE (OUTPATIENT)
Dept: GERIATRIC MEDICINE | Facility: CLINIC | Age: 89
End: 2024-06-25

## 2024-07-03 DIAGNOSIS — I10 BENIGN ESSENTIAL HYPERTENSION: Primary | ICD-10-CM

## 2024-07-05 ENCOUNTER — TELEPHONE (OUTPATIENT)
Dept: RHEUMATOLOGY | Facility: CLINIC | Age: 89
End: 2024-07-05
Payer: MEDICARE

## 2024-07-05 DIAGNOSIS — M15.9 GENERALIZED OSTEOARTHRITIS OF MULTIPLE SITES: ICD-10-CM

## 2024-07-05 RX ORDER — TRAMADOL HYDROCHLORIDE 50 MG/1
50 TABLET ORAL 3 TIMES DAILY
Qty: 90 TABLET | Refills: 1 | Status: SHIPPED | OUTPATIENT
Start: 2024-07-05

## 2024-07-10 RX ORDER — AMLODIPINE BESYLATE 5 MG/1
5 TABLET ORAL DAILY
Qty: 180 TABLET | Refills: 0 | Status: SHIPPED | OUTPATIENT
Start: 2024-07-10 | End: 2025-01-06

## 2024-07-10 RX ORDER — TRAMADOL HYDROCHLORIDE 50 MG/1
50 TABLET ORAL 3 TIMES DAILY PRN
Qty: 90 TABLET | Refills: 1 | OUTPATIENT
Start: 2024-07-10

## 2024-07-10 RX ORDER — HYDROCHLOROTHIAZIDE 25 MG/1
25 TABLET ORAL
Qty: 180 TABLET | Refills: 0 | Status: SHIPPED | OUTPATIENT
Start: 2024-07-10 | End: 2025-01-06

## 2024-07-17 ENCOUNTER — APPOINTMENT (OUTPATIENT)
Dept: GASTROENTEROLOGY | Facility: CLINIC | Age: 89
End: 2024-07-17
Payer: MEDICARE

## 2024-07-17 VITALS — BODY MASS INDEX: 19.44 KG/M2 | WEIGHT: 99 LBS | HEIGHT: 60 IN | HEART RATE: 75 BPM

## 2024-07-17 DIAGNOSIS — K21.9 GASTROESOPHAGEAL REFLUX DISEASE WITHOUT ESOPHAGITIS: Primary | ICD-10-CM

## 2024-07-17 DIAGNOSIS — R13.12 OROPHARYNGEAL DYSPHAGIA: ICD-10-CM

## 2024-07-17 DIAGNOSIS — K22.70 BARRETT'S ESOPHAGUS WITHOUT DYSPLASIA: ICD-10-CM

## 2024-07-17 PROCEDURE — 1159F MED LIST DOCD IN RCRD: CPT | Performed by: INTERNAL MEDICINE

## 2024-07-17 PROCEDURE — 1123F ACP DISCUSS/DSCN MKR DOCD: CPT | Performed by: INTERNAL MEDICINE

## 2024-07-17 PROCEDURE — 1157F ADVNC CARE PLAN IN RCRD: CPT | Performed by: INTERNAL MEDICINE

## 2024-07-17 PROCEDURE — 99204 OFFICE O/P NEW MOD 45 MIN: CPT | Performed by: INTERNAL MEDICINE

## 2024-07-17 ASSESSMENT — ENCOUNTER SYMPTOMS
HEMATOLOGIC/LYMPHATIC NEGATIVE: 1
NEUROLOGICAL NEGATIVE: 1
ALLERGIC/IMMUNOLOGIC NEGATIVE: 1
CARDIOVASCULAR NEGATIVE: 1
GASTROINTESTINAL NEGATIVE: 1
EYES NEGATIVE: 1
CONSTITUTIONAL NEGATIVE: 1
MUSCULOSKELETAL NEGATIVE: 1
ENDOCRINE NEGATIVE: 1
PSYCHIATRIC NEGATIVE: 1
RESPIRATORY NEGATIVE: 1

## 2024-07-17 NOTE — ASSESSMENT & PLAN NOTE
Patient is a 89-year-old with a history of Parkinson's disease, cataracts, hyperlipidemia, coronary artery disease, hypothyroidism, and who has Keyes's esophagus.  She has not had an endoscopy for 10 years.  She is largely asymptomatic with occasional issues of swallowing up in the back of the throat but no esophageal dysphagia.  She denies nausea, vomiting or GI bleeding.  We had a discussion concerning the surveillance of Keyes's esophagus and we have decided not to pursue this due to the risk outweighing the benefit of upper endoscopy.  If she did have cancer of the esophagus she states she would not do anything about it anyway at her age of 89.  I am recommending Prilosec on a daily basis long-term.  I be happy to talk to her she or her daughters about this as well.  She is comfortable with this approach.

## 2024-07-17 NOTE — PROGRESS NOTES
Keyes's esophagus  Gastroesophageal reflux  Oropharyngeal dysphagia    History of Present Illness:   Lewis Rose is a 89 y.o. female with PMHx of Parkinson's disease, coronary artery disease, adenomatous polyps, hypothyroidism, and who has Keyes's esophagus who presents to GI clinic for consultation.  Patient has not had an endoscopy for greater than 10 years and the same with colonoscopy.  She is doing well without difficulty swallowing, pain on swallowing, vomiting blood, coffee-ground emesis or abdominal pain.  She denies any other complaints other than occasional hoarseness.  She is doing well in assisted living taking Tylenol as needed.  She has no other complaints.    Review of Systems  Review of Systems   Constitutional: Negative.    HENT: Negative.     Eyes: Negative.    Respiratory: Negative.     Cardiovascular: Negative.    Gastrointestinal: Negative.    Endocrine: Negative.    Genitourinary: Negative.    Musculoskeletal: Negative.    Skin: Negative.    Allergic/Immunologic: Negative.    Neurological: Negative.    Hematological: Negative.    Psychiatric/Behavioral: Negative.     All other systems reviewed and are negative.      Past Medical History  she  has a past medical history of Age-related nuclear cataract, left eye (04/04/2016), Age-related nuclear cataract, right eye (04/04/2016), Anxiety, Arthritis, Conjunctival cysts, left eye (10/02/2015), Cortical age-related cataract, left eye (10/02/2015), Cortical age-related cataract, right eye (10/08/2015), Diabetes mellitus (Multi), Disease of thyroid gland, Dry eye syndrome of bilateral lacrimal glands, Encounter for general adult medical examination without abnormal findings (09/26/2017), Encounter for other procedures for purposes other than remedying health state (10/13/2015), Glaucoma, Meibomian gland dysfunction right eye, upper and lower eyelids (08/30/2022), Other conditions influencing health status, Other specified anxiety disorders  (05/20/2021), Pain in unspecified hip (07/10/2020), Pain in unspecified hip (07/02/2019), Pain in unspecified knee (06/09/2021), Parkinson's disease (Multi), Personal history of malignant melanoma of skin (04/04/2016), Personal history of other diseases of the musculoskeletal system and connective tissue (06/03/2018), Personal history of other diseases of the nervous system and sense organs, Personal history of other diseases of urinary system, Personal history of other drug therapy (08/19/2021), Personal history of other endocrine, nutritional and metabolic disease (05/20/2021), Personal history of other endocrine, nutritional and metabolic disease (04/04/2016), Personal history of other infectious and parasitic diseases (05/05/2017), Personal history of other mental and behavioral disorders (04/04/2016), Persons encountering health services in other specified circumstances (05/20/2021), Preglaucoma, unspecified, 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     Social History  she  reports that she has never smoked. She has never used smokeless tobacco. She reports that she does not currently use alcohol. She reports that she does not use drugs.     Family History  her family history includes Accidental death in her father; Breast cancer in her mother; CVA in her brother.     Allergies  Allergies   Allergen Reactions    Adhesive Tape-Silicones Hives    Sitagliptin Unknown     Blistering rash       Medications  Current Outpatient Medications   Medication Instructions    acetaminophen (TYLENOL) 500 mg, oral, Every 6 hours PRN    amLODIPine (NORVASC) 5 mg, oral, Daily    ammonium lactate (Amlactin) 12 % cream Topical, As needed    aspirin 81 mg, oral, Daily     brimonidine (AlphaGAN) 0.2 % ophthalmic solution ADMINISTER 1 DROP INTO BOTH EYES TWICE DAILY    carbidopa-levodopa (Sinemet)  mg tablet 1 tablet, oral, 3 times daily    cholecalciferol (VITAMIN D-3) 125 mcg, oral, Daily    dorzolamide-timoloL (Cosopt) 22.3-6.8 mg/mL ophthalmic solution 1 drop, Both Eyes, 2 times daily    DULoxetine (CYMBALTA) 30 mg, oral, Daily, Do not crush or chew.    hydroCHLOROthiazide (HYDRODIURIL) 25 mg, oral, Daily (0630)    levothyroxine (Synthroid, Levoxyl) 75 mcg tablet TAKE 1 TABLET BY MOUTH EVERY DAY TAKE 6 DAYS PER WEEK ONLY    omeprazole OTC (PRILOSEC OTC) 20 mg, oral, Daily before breakfast, Do not crush, chew, or split.    pregabalin (LYRICA) 75 mg, oral, Daily    traMADol (ULTRAM) 50 mg, oral, 3 times daily    triamcinolone (Kenalog) 0.1 % cream     valsartan (DIOVAN) 160 mg, oral, Daily        Objective   Visit Vitals  Pulse 75      Physical Exam  Constitutional:       Appearance: Normal appearance.   Eyes:      Conjunctiva/sclera: Conjunctivae normal.   Cardiovascular:      Rate and Rhythm: Normal rate and regular rhythm.   Pulmonary:      Effort: Pulmonary effort is normal.      Breath sounds: Normal breath sounds.   Abdominal:      General: Abdomen is flat. Bowel sounds are normal.      Palpations: Abdomen is soft.   Musculoskeletal:         General: Normal range of motion.      Cervical back: Normal range of motion.   Neurological:      Mental Status: She is alert.           Lab Results   Component Value Date    WBC 6.3 05/10/2024    WBC 7.8 05/09/2024    WBC 9.6 04/21/2024    HGB 13.3 05/10/2024    HGB 14.6 05/09/2024    HGB 14.5 04/21/2024    HCT 40.8 05/10/2024    HCT 42.8 05/09/2024    HCT 41.3 04/21/2024     05/10/2024     05/09/2024     04/21/2024     Lab Results   Component Value Date     05/10/2024     (L) 05/09/2024     (L) 04/21/2024    K 3.8 05/10/2024    K 3.9 05/09/2024    K 3.6 04/21/2024     05/10/2024    CL 96  (L) 05/09/2024    CL 92 (L) 04/21/2024    CO2 24 05/10/2024    CO2 25 05/09/2024    CO2 22 04/21/2024    BUN 16 05/10/2024    BUN 19 05/09/2024    BUN 22 04/21/2024    CREATININE 0.79 05/10/2024    CREATININE 0.93 05/09/2024    CREATININE 0.81 04/21/2024    CALCIUM 9.1 05/10/2024    CALCIUM 9.6 05/09/2024    CALCIUM 9.3 04/21/2024    PROT 7.6 05/09/2024    PROT 7.0 04/21/2024    PROT 7.1 08/22/2023    BILITOT 0.7 05/09/2024    BILITOT 0.9 04/21/2024    BILITOT 0.5 08/22/2023    ALKPHOS 89 05/09/2024    ALKPHOS 86 04/21/2024    ALKPHOS 95 08/22/2023    ALT 11 05/09/2024    ALT 14 04/21/2024    ALT 9 08/22/2023    AST 16 05/09/2024    AST 17 04/21/2024    AST 16 08/22/2023    GLUCOSE 85 05/10/2024    GLUCOSE 118 (H) 05/09/2024    GLUCOSE 123 (H) 04/21/2024           Lewis Rose is a 89 y.o. female who presents to GI clinic for Keyes's esophagus and chronic gastroesophageal reflux.    Keyes's esophagus without dysplasia  Patient is a 89-year-old with a history of Parkinson's disease, cataracts, hyperlipidemia, coronary artery disease, hypothyroidism, and who has Keyes's esophagus.  She has not had an endoscopy for 10 years.  She is largely asymptomatic with occasional issues of swallowing up in the back of the throat but no esophageal dysphagia.  She denies nausea, vomiting or GI bleeding.  We had a discussion concerning the surveillance of Keyes's esophagus and we have decided not to pursue this due to the risk outweighing the benefit of upper endoscopy.  If she did have cancer of the esophagus she states she would not do anything about it anyway at her age of 89.  I am recommending Prilosec on a daily basis long-term.  I be happy to talk to her she or her daughters about this as well.  She is comfortable with this approach.    Gastroesophageal reflux disease with esophagitis  See previous discussion    Oropharyngeal dysphagia  Patient is an 89-year-old with Parkinson's who rarely has some feeling of  difficulty swallowing in the back of the throat.  I did offer an esophagram and she will consider and talk to her daughters.  This could be a noninvasive measure to also assess the esophagus and also indirectly rule out an esophageal mass.  She has no symptoms with respect to any esophageal dysphagia or alarming symptoms as noted above.       Neville Markham MD

## 2024-07-17 NOTE — ASSESSMENT & PLAN NOTE
Patient is an 89-year-old with Parkinson's who rarely has some feeling of difficulty swallowing in the back of the throat.  I did offer an esophagram and she will consider and talk to her daughters.  This could be a noninvasive measure to also assess the esophagus and also indirectly rule out an esophageal mass.  She has no symptoms with respect to any esophageal dysphagia or alarming symptoms as noted above.   Czech creole

## 2024-07-25 ENCOUNTER — APPOINTMENT (OUTPATIENT)
Dept: AUDIOLOGY | Facility: CLINIC | Age: 89
End: 2024-07-25
Payer: MEDICARE

## 2024-07-29 ENCOUNTER — APPOINTMENT (OUTPATIENT)
Dept: RHEUMATOLOGY | Facility: CLINIC | Age: 89
End: 2024-07-29
Payer: MEDICARE

## 2024-07-30 DIAGNOSIS — I10 BENIGN ESSENTIAL HYPERTENSION: ICD-10-CM

## 2024-07-31 RX ORDER — VALSARTAN 160 MG/1
160 TABLET ORAL DAILY
Qty: 90 TABLET | Refills: 3 | Status: SHIPPED | OUTPATIENT
Start: 2024-07-31

## 2024-08-10 DIAGNOSIS — G89.29 CHRONIC LOW BACK PAIN WITH SCIATICA, SCIATICA LATERALITY UNSPECIFIED, UNSPECIFIED BACK PAIN LATERALITY: ICD-10-CM

## 2024-08-10 DIAGNOSIS — M54.40 CHRONIC LOW BACK PAIN WITH SCIATICA, SCIATICA LATERALITY UNSPECIFIED, UNSPECIFIED BACK PAIN LATERALITY: ICD-10-CM

## 2024-08-12 RX ORDER — DULOXETIN HYDROCHLORIDE 30 MG/1
30 CAPSULE, DELAYED RELEASE ORAL DAILY
Qty: 90 CAPSULE | Refills: 1 | Status: SHIPPED | OUTPATIENT
Start: 2024-08-12

## 2024-08-21 ENCOUNTER — APPOINTMENT (OUTPATIENT)
Dept: RHEUMATOLOGY | Facility: CLINIC | Age: 89
End: 2024-08-21
Payer: MEDICARE

## 2024-08-21 VITALS
BODY MASS INDEX: 18.79 KG/M2 | DIASTOLIC BLOOD PRESSURE: 76 MMHG | OXYGEN SATURATION: 100 % | WEIGHT: 96.2 LBS | HEART RATE: 83 BPM | SYSTOLIC BLOOD PRESSURE: 122 MMHG

## 2024-08-21 DIAGNOSIS — M15.9 GENERALIZED OSTEOARTHRITIS OF MULTIPLE SITES: ICD-10-CM

## 2024-08-21 DIAGNOSIS — M54.50 CHRONIC LOW BACK PAIN, UNSPECIFIED BACK PAIN LATERALITY, UNSPECIFIED WHETHER SCIATICA PRESENT: Primary | ICD-10-CM

## 2024-08-21 DIAGNOSIS — G89.29 CHRONIC LOW BACK PAIN, UNSPECIFIED BACK PAIN LATERALITY, UNSPECIFIED WHETHER SCIATICA PRESENT: Primary | ICD-10-CM

## 2024-08-21 DIAGNOSIS — G20.A2 PARKINSON'S DISEASE WITH FLUCTUATING MANIFESTATIONS, UNSPECIFIED WHETHER DYSKINESIA PRESENT (MULTI): ICD-10-CM

## 2024-08-21 PROCEDURE — 1157F ADVNC CARE PLAN IN RCRD: CPT | Performed by: INTERNAL MEDICINE

## 2024-08-21 PROCEDURE — 3074F SYST BP LT 130 MM HG: CPT | Performed by: INTERNAL MEDICINE

## 2024-08-21 PROCEDURE — 1159F MED LIST DOCD IN RCRD: CPT | Performed by: INTERNAL MEDICINE

## 2024-08-21 PROCEDURE — 99214 OFFICE O/P EST MOD 30 MIN: CPT | Performed by: INTERNAL MEDICINE

## 2024-08-21 PROCEDURE — 1126F AMNT PAIN NOTED NONE PRSNT: CPT | Performed by: INTERNAL MEDICINE

## 2024-08-21 PROCEDURE — 1123F ACP DISCUSS/DSCN MKR DOCD: CPT | Performed by: INTERNAL MEDICINE

## 2024-08-21 PROCEDURE — 3078F DIAST BP <80 MM HG: CPT | Performed by: INTERNAL MEDICINE

## 2024-08-21 PROCEDURE — 1036F TOBACCO NON-USER: CPT | Performed by: INTERNAL MEDICINE

## 2024-08-21 ASSESSMENT — PAIN SCALES - GENERAL: PAINLEVEL: 0-NO PAIN

## 2024-08-21 NOTE — PROGRESS NOTES
Subjective   Patient ID: Lewis Rose is a 88 y.o. female who presents for Osteoarthritis and Pain (FUV-Taking Tramadol 50 PRN and Extra Strength Tylenol PRN. Tox screen up to date. CSA due today. Also, would like to discuss Lyrica.).    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         ROS      Current Outpatient Medications   Medication Sig Dispense Refill    acetaminophen (Tylenol) 500 mg tablet Take 1 tablet (500 mg) by mouth every 6 hours if needed for mild pain (1 - 3).      ammonium lactate (Amlactin) 12 % cream Apply topically if needed.      aspirin 81 mg EC tablet Take 1 tablet (81 mg) by mouth once daily.      brimonidine (AlphaGAN) 0.2 % ophthalmic solution Administer 1 drop into both eyes 2 times a day. 30 mL 0    carbidopa-levodopa (Sinemet)  mg tablet Take 1 tablet by mouth 3 times a day.      DULoxetine (Cymbalta) 30 mg DR capsule Take 1 capsule (30 mg) by mouth once daily. Do not crush or chew.      hydroCHLOROthiazide (HYDRODiuril) 25 mg tablet TAKE 1 TABLET BY MOUTH EVERY DAY 90 tablet 1    levothyroxine (Synthroid, Levoxyl) 75 mcg tablet TAKE 1 TABLET BY MOUTH EVERY DAY TAKE 6 DAYS PER WEEK ONLY 90 tablet 1    omeprazole OTC (PriLOSEC OTC) 20 mg EC tablet Take 1 tablet (20 mg) by mouth once daily in the morning. Take before meals. Do not crush, chew, or split.      timolol (Timoptic) 0.25 % ophthalmic solution INSTILL 1 DROP IN BOTH EYES TWICE DAILY 10 mL 0    valsartan (Diovan) 80 mg tablet TAKE 1 TABLET BY MOUTH ONCE DAILY. 30 tablet 5    pregabalin (Lyrica) 150 mg capsule Take 1 capsule (150  mg) by mouth once daily. 90 capsule 1    [START ON 1/11/2024] traMADol (Ultram) 50 mg tablet Take 1 tablet (50 mg) by mouth 3 times a day as needed for moderate pain (4 - 6). Do not start before January 11, 2024. 90 tablet 1    triamcinolone (Kenalog) 0.1 % cream        No current facility-administered medications for this visit.         has a past medical history of Age-related nuclear cataract, left eye (04/04/2016), Age-related nuclear cataract, right eye (04/04/2016), Anxiety, Arthritis, Conjunctival cysts, left eye (10/02/2015), Cortical age-related cataract, left eye (10/02/2015), Cortical age-related cataract, right eye (10/08/2015), Diabetes mellitus (CMS/[facility]), Disease of thyroid gland, Dry eye syndrome of bilateral lacrimal glands, Encounter for general adult medical examination without abnormal findings (09/26/2017), Encounter for other procedures for purposes other than remedying health state (10/13/2015), Glaucoma, Meibomian gland dysfunction right eye, upper and lower eyelids (08/30/2022), Other conditions influencing health status, Other specified anxiety disorders (05/20/2021), Pain in unspecified hip (07/10/2020), Pain in unspecified hip (07/02/2019), Pain in unspecified knee (06/09/2021), Parkinson's disease, Personal history of malignant melanoma of skin (04/04/2016), Personal history of other diseases of the musculoskeletal system and connective tissue (06/03/2018), Personal history of other diseases of the nervous system and sense organs, Personal history of other diseases of urinary system, Personal history of other drug therapy (08/19/2021), Personal history of other endocrine, nutritional and metabolic disease (05/20/2021), Personal history of other endocrine, nutritional and metabolic disease (04/04/2016), Personal history of other infectious and parasitic diseases (05/05/2017), Personal history of other mental and behavioral disorders (04/04/2016), Persons encountering health services in  other specified circumstances (05/20/2021), Preglaucoma, unspecified, 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   Past Surgical History:   Procedure Laterality Date    APPENDECTOMY  04/04/2016    Appendectomy    BELT ABDOMINOPLASTY  04/04/2016    Abdominoplasty    CATARACT EXTRACTION  04/04/2016    Cataract Surgery    OTHER SURGICAL HISTORY  07/01/2022    Knee replacement    TOTAL THYROIDECTOMY  04/04/2016    Thyroid Surgery Total Thyroidectomy      Social History     Tobacco Use    Smoking status: Never    Smokeless tobacco: Never   Substance Use Topics    Alcohol use: Not Currently    Drug use: Never      family history includes Accidental death in her father; Breast cancer in her mother; CVA in her brother.  Pain Management Panel  More data exists         Latest Ref Rng & Units 8/31/2023 9/7/2022   Pain Management Panel   Amphetamine Screen, Urine NEGATIVE PRESUMPTIVE NEGATIVE  PRESUMPTIVE NEGATIVE    Barbiturate Screen, Urine NEGATIVE PRESUMPTIVE NEGATIVE  PRESUMPTIVE NEGATIVE    Codeine IgE Cutoff <50 ng/mL <50  <50    Hydromorphone Urine Cutoff <25 ng/mL <25  <25    Morphine  Cutoff <50 ng/mL <50  <50         Vitals:    12/11/23 1342   BP: 150/86   Pulse: 97     Lab Results   Component Value Date    TSH 1.35 08/22/2023       PHYSICAL EXAM      NODES   HEART  LUNGS  ABDOMEN   VASCULAR  NEURO   SKIN  JOINTS joint are oa only and no synovitis     PLAN  Diagnoses and all orders for this visit:  Generalized osteoarthritis of multiple sites  -     Drug Screen, Urine With Reflex to Confirmation; Future  -     pregabalin (Lyrica) 150 mg capsule; Take 1 capsule (150 mg) by mouth once daily.  -     traMADol (Ultram) 50 mg tablet; Take 1 tablet (50 mg) by mouth  3 times a day as needed for moderate pain (4 - 6). Do not start before January 11, 2024.  Arthralgia, unspecified joint  -     Drug Screen, Urine With Reflex to Confirmation; Future    Routine follow-up for her chronic pain which includes generalized osteoarthritis and degenerative disc disease  Her Parkinson's is stable and her meds have been decreased because of potential side effect    She will continue with her tramadol her urine tox screen is up-to-date   Her OARRS was reviewed and is okay    No need for Lyrica   I will see her back in 3 months

## 2024-08-22 ENCOUNTER — OFFICE VISIT (OUTPATIENT)
Dept: GERIATRIC MEDICINE | Facility: CLINIC | Age: 89
End: 2024-08-22
Payer: MEDICARE

## 2024-08-22 VITALS
HEART RATE: 83 BPM | SYSTOLIC BLOOD PRESSURE: 126 MMHG | BODY MASS INDEX: 18.83 KG/M2 | WEIGHT: 96.4 LBS | DIASTOLIC BLOOD PRESSURE: 78 MMHG | TEMPERATURE: 96.8 F

## 2024-08-22 DIAGNOSIS — F01.B0 VASCULAR DEMENTIA, MODERATE, WITHOUT BEHAVIORAL DISTURBANCE, PSYCHOTIC DISTURBANCE, MOOD DISTURBANCE, AND ANXIETY (MULTI): Primary | ICD-10-CM

## 2024-08-22 DIAGNOSIS — L85.3 DRY SKIN: ICD-10-CM

## 2024-08-22 DIAGNOSIS — I10 PRIMARY HYPERTENSION: ICD-10-CM

## 2024-08-22 DIAGNOSIS — L57.9 SKIN CHANGES DUE TO CHRONIC EXPOSURE TO NONIONIZING RADIATION, UNSPECIFIED: ICD-10-CM

## 2024-08-22 PROCEDURE — 99215 OFFICE O/P EST HI 40 MIN: CPT | Performed by: NURSE PRACTITIONER

## 2024-08-22 PROCEDURE — 1123F ACP DISCUSS/DSCN MKR DOCD: CPT | Performed by: NURSE PRACTITIONER

## 2024-08-22 PROCEDURE — 1157F ADVNC CARE PLAN IN RCRD: CPT | Performed by: NURSE PRACTITIONER

## 2024-08-22 PROCEDURE — 1159F MED LIST DOCD IN RCRD: CPT | Performed by: NURSE PRACTITIONER

## 2024-08-22 PROCEDURE — 1036F TOBACCO NON-USER: CPT | Performed by: NURSE PRACTITIONER

## 2024-08-22 PROCEDURE — 3078F DIAST BP <80 MM HG: CPT | Performed by: NURSE PRACTITIONER

## 2024-08-22 PROCEDURE — 1160F RVW MEDS BY RX/DR IN RCRD: CPT | Performed by: NURSE PRACTITIONER

## 2024-08-22 PROCEDURE — 1126F AMNT PAIN NOTED NONE PRSNT: CPT | Performed by: NURSE PRACTITIONER

## 2024-08-22 PROCEDURE — 3074F SYST BP LT 130 MM HG: CPT | Performed by: NURSE PRACTITIONER

## 2024-08-22 ASSESSMENT — PATIENT HEALTH QUESTIONNAIRE - PHQ9
2. FEELING DOWN, DEPRESSED OR HOPELESS: NOT AT ALL
1. LITTLE INTEREST OR PLEASURE IN DOING THINGS: NOT AT ALL
SUM OF ALL RESPONSES TO PHQ9 QUESTIONS 1 AND 2: 0

## 2024-08-22 ASSESSMENT — ENCOUNTER SYMPTOMS
OCCASIONAL FEELINGS OF UNSTEADINESS: 0
LOSS OF SENSATION IN FEET: 0

## 2024-08-22 ASSESSMENT — PAIN SCALES - GENERAL: PAINLEVEL: 0-NO PAIN

## 2024-08-22 NOTE — PROGRESS NOTES
Subjective   Patient ID: Lewis Rose is a 89 y.o. female who presents for Follow-up.  126/78    Lewis Rose is a pleasant 89 y.o.  female who presents for geriatric medicine follow-up. She is accompanied by sheridan hired  and . Last visit was 6/6/2024. uPt and  deny any acute concerns/issues. MA reports pt with elevated BP, but was 126/78 when retaken later with manual cuff. Measurement similar to previous day's BP. Patient down 3 lbs since July. Denies change in appetite, illness. Briefly discussed BP medications and confirmed decreasing Diovan from 160 mg to 80 mg.      Current Outpatient Medications on File Prior to Visit   Medication Sig Dispense Refill    acetaminophen (Tylenol) 500 mg tablet Take 1 tablet (500 mg) by mouth every 6 hours if needed for mild pain (1 - 3).      amLODIPine (Norvasc) 5 mg tablet Take 1 tablet (5 mg) by mouth once daily. 180 tablet 0    ammonium lactate (Amlactin) 12 % cream Apply topically if needed.      brimonidine (AlphaGAN) 0.2 % ophthalmic solution ADMINISTER 1 DROP INTO BOTH EYES TWICE DAILY 30 mL 1    carbidopa-levodopa (Sinemet)  mg tablet Take 1 tablet by mouth 3 times a day.      cholecalciferol (Vitamin D-3) 125 MCG (5000 UT) capsule Take 1 capsule (125 mcg) by mouth once daily.      dorzolamide-timoloL (Cosopt) 22.3-6.8 mg/mL ophthalmic solution Administer 1 drop into both eyes 2 times a day. 30 mL 1    DULoxetine (Cymbalta) 30 mg DR capsule TAKE 1 CAPSULE (30 MG) BY MOUTH ONCE DAILY. DO NOT CRUSH OR CHEW. 90 capsule 1    hydroCHLOROthiazide (HYDRODiuril) 25 mg tablet Take 1 tablet (25 mg) by mouth early in the morning.. 180 tablet 0    levothyroxine (Synthroid, Levoxyl) 75 mcg tablet TAKE 1 TABLET BY MOUTH EVERY DAY TAKE 6 DAYS PER WEEK ONLY 90 tablet 1    omeprazole OTC (PriLOSEC OTC) 20 mg EC tablet Take 1 tablet (20 mg) by mouth once daily in the morning. Take before meals. Do not crush, chew, or split.      traMADol (Ultram) 50  mg tablet Take 1 tablet (50 mg) by mouth 3 times a day. 90 tablet 1    triamcinolone (Kenalog) 0.1 % cream if needed.      valsartan (Diovan) 80 mg tablet TAKE 1 TABLET BY MOUTH EVERY DAY 90 tablet 0    [DISCONTINUED] aspirin 81 mg EC tablet Take 1 tablet (81 mg) by mouth once daily.       No current facility-administered medications on file prior to visit.         Review of Systems   All other systems reviewed and are negative.      Objective   Vitals:    08/22/24 1136   BP: 126/78   BP Location: Left arm  Comment: Manual cuff   Patient Position: Sitting   BP Cuff Size: Adult   Pulse: 83   Temp: 36 °C (96.8 °F)   TempSrc: Tympanic   Weight: (!) 43.7 kg (96 lb 6.4 oz)       Physical Exam  Vitals (BMI 18.83) reviewed.   Constitutional:       Appearance: Normal appearance. She is well-groomed and underweight. She is not ill-appearing, toxic-appearing or diaphoretic.   HENT:      Nose: Nose normal.   Eyes:      Conjunctiva/sclera: Conjunctivae normal.   Cardiovascular:      Pulses: Normal pulses.      Heart sounds: Normal heart sounds.   Pulmonary:      Effort: Pulmonary effort is normal.      Breath sounds: Normal breath sounds.   Abdominal:      General: Abdomen is flat. Bowel sounds are normal.   Musculoskeletal:         General: Normal range of motion.   Skin:     General: Skin is warm and dry.      Coloration: Skin is ashen.      Findings: Rash present. No ecchymosis or erythema. Rash is crusting and scaling. Rash is not macular, nodular, papular, purpuric, pustular, urticarial or vesicular.             Comments: Generalized dry, flaky, friable   Neurological:      Mental Status: She is alert. Mental status is at baseline.   Psychiatric:         Attention and Perception: Attention normal.         Mood and Affect: Mood normal.         Speech: Speech normal.         Behavior: Behavior normal. Behavior is cooperative.         Thought Content: Thought content normal.         Cognition and Memory: Cognition normal. She  exhibits impaired recent memory.         Assessment/Plan   Problem List Items Addressed This Visit             ICD-10-CM    Primary hypertension I10     Continue with Diovan 80 mg once daily  Continue with levothyroxine 75 mcg once daily  Continue with amlodipine 5 mg once daily  Will continue to monitor medication management with [facility] Clinical Staff         Vascular dementia, moderate, without behavioral disturbance, psychotic disturbance, mood disturbance, and anxiety (Multi) - Primary F01.B0     Patient currently living at care facility at [facility] with option to move up in acuity including on site memory care unit. On site  service. Private pay . Family involved.         Relevant Orders    Follow Up In Geriatrics    Skin changes due to chronic exposure to nonionizing radiation, unspecified L57.9     Patient reports having skin changes across lower neck and upper chest. Previous used Amlactin for a few days but stopped when it did not produce any noticeable changes in skin texture (skin did not become smooth) as soon as patient thought it should. Discussed how skin products work slowly overtime and how patient is needed when a large area of the skin is involved. Patient stated she was willing to try product again.  Continue with ammonium lactate 12% cream BID as needed until skin condition resolved         Dry skin L85.3     Recommend trying 100% Glycerin oil (Now Brand). Apply immediately after bath/shower. Pat skin dry,            Time Spent  Prep time on day of patient encounter: 2 minutes  Time spent directly with patient, family or caregiver: 31 minutes  Additional Time Spent on Patient Care Activities: 0 minutes  Documentation Time: 30 minutes  Other Time Spent: 0 minutes  Total: 63 minutes        ERICA Prado 08/28/24 10:53 AM

## 2024-08-28 PROBLEM — L85.3 DRY SKIN: Status: ACTIVE | Noted: 2024-08-28

## 2024-08-28 PROBLEM — L57.9 SKIN CHANGES DUE TO CHRONIC EXPOSURE TO NONIONIZING RADIATION, UNSPECIFIED: Status: ACTIVE | Noted: 2024-08-28

## 2024-08-28 NOTE — ASSESSMENT & PLAN NOTE
Patient reports having skin changes across lower neck and upper chest. Previous used Amlactin for a few days but stopped when it did not produce any noticeable changes in skin texture (skin did not become smooth) as soon as patient thought it should. Discussed how skin products work slowly overtime and how patient is needed when a large area of the skin is involved. Patient stated she was willing to try product again.  Continue with ammonium lactate 12% cream BID as needed until skin condition resolved

## 2024-08-28 NOTE — ASSESSMENT & PLAN NOTE
Recommend trying 100% Glycerin oil (Now Brand). Apply immediately after bath/shower. Pat skin dry,

## 2024-08-28 NOTE — ASSESSMENT & PLAN NOTE
Continue with Diovan 80 mg once daily  Continue with levothyroxine 75 mcg once daily  Continue with amlodipine 5 mg once daily  Will continue to monitor medication management with [facility] Clinical Staff

## 2024-08-28 NOTE — ASSESSMENT & PLAN NOTE
Patient currently living at care facility at [facility] with option to move up in acuity including on site memory care unit. On site  service. Private pay . Family involved.

## 2024-09-17 ENCOUNTER — APPOINTMENT (OUTPATIENT)
Dept: OPHTHALMOLOGY | Facility: CLINIC | Age: 89
End: 2024-09-17
Payer: MEDICARE

## 2024-09-27 DIAGNOSIS — I10 BENIGN ESSENTIAL HYPERTENSION: ICD-10-CM

## 2024-09-28 RX ORDER — HYDROCHLOROTHIAZIDE 25 MG/1
25 TABLET ORAL
Qty: 90 TABLET | Refills: 1 | Status: SHIPPED | OUTPATIENT
Start: 2024-09-28 | End: 2025-03-27

## 2024-10-07 DIAGNOSIS — I10 BENIGN ESSENTIAL HYPERTENSION: ICD-10-CM

## 2024-10-09 DIAGNOSIS — I10 BENIGN ESSENTIAL HYPERTENSION: ICD-10-CM

## 2024-10-09 RX ORDER — AMLODIPINE BESYLATE 5 MG/1
5 TABLET ORAL DAILY
Qty: 90 TABLET | Refills: 3 | Status: SHIPPED | OUTPATIENT
Start: 2024-10-09 | End: 2025-10-09

## 2024-10-25 DIAGNOSIS — E03.8 OTHER SPECIFIED HYPOTHYROIDISM: ICD-10-CM

## 2024-10-27 RX ORDER — LEVOTHYROXINE SODIUM 75 UG/1
TABLET ORAL
Qty: 77 TABLET | Refills: 2 | Status: SHIPPED | OUTPATIENT
Start: 2024-10-27

## 2024-11-19 NOTE — PROGRESS NOTES
Subjective   Patient ID: Lewis Rose is a 88 y.o. female who presents for Osteoarthritis and Pain (FUV-Taking Tramadol 50 PRN and Extra Strength Tylenol PRN. Tox screen up to date. CSA due today. Also, would like to discuss Lyrica.).    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      ammonium lactate (Amlactin) 12 % cream Apply topically if needed.      aspirin 81 mg EC tablet Take 1 tablet (81 mg) by mouth once daily.      brimonidine (AlphaGAN) 0.2 % ophthalmic solution Administer 1 drop into both eyes 2 times a day. 30 mL 0    carbidopa-levodopa (Sinemet)  mg tablet Take 1 tablet by mouth 3 times a day.      DULoxetine (Cymbalta) 30 mg DR capsule Take 1 capsule (30 mg) by mouth once daily. Do not crush or chew.      hydroCHLOROthiazide (HYDRODiuril) 25 mg tablet TAKE 1 TABLET BY MOUTH EVERY DAY 90 tablet 1    levothyroxine (Synthroid, Levoxyl) 75 mcg tablet TAKE 1 TABLET BY MOUTH EVERY DAY TAKE 6 DAYS PER WEEK ONLY 90 tablet 1    omeprazole OTC (PriLOSEC OTC) 20 mg EC tablet Take 1 tablet (20 mg) by mouth once daily in the morning. Take before meals. Do not crush, chew, or split.      timolol (Timoptic) 0.25 % ophthalmic solution INSTILL 1 DROP IN BOTH EYES TWICE DAILY 10 mL 0    valsartan (Diovan) 80 mg tablet TAKE 1 TABLET BY MOUTH ONCE DAILY. 30 tablet 5    pregabalin (Lyrica) 150 mg capsule Take 1 capsule (150 mg) by mouth once daily. 90 capsule 1     [START ON 1/11/2024] traMADol (Ultram) 50 mg tablet Take 1 tablet (50 mg) by mouth 3 times a day as needed for moderate pain (4 - 6). Do not start before January 11, 2024. 90 tablet 1    triamcinolone (Kenalog) 0.1 % cream        No current facility-administered medications for this visit.         has a past medical history of Age-related nuclear cataract, left eye (04/04/2016), Age-related nuclear cataract, right eye (04/04/2016), Anxiety, Arthritis, Conjunctival cysts, left eye (10/02/2015), Cortical age-related cataract, left eye (10/02/2015), Cortical age-related cataract, right eye (10/08/2015), Diabetes mellitus (CMS/[facility]), Disease of thyroid gland, Dry eye syndrome of bilateral lacrimal glands, Encounter for general adult medical examination without abnormal findings (09/26/2017), Encounter for other procedures for purposes other than remedying health state (10/13/2015), Glaucoma, Meibomian gland dysfunction right eye, upper and lower eyelids (08/30/2022), Other conditions influencing health status, Other specified anxiety disorders (05/20/2021), Pain in unspecified hip (07/10/2020), Pain in unspecified hip (07/02/2019), Pain in unspecified knee (06/09/2021), Parkinson's disease, Personal history of malignant melanoma of skin (04/04/2016), Personal history of other diseases of the musculoskeletal system and connective tissue (06/03/2018), Personal history of other diseases of the nervous system and sense organs, Personal history of other diseases of urinary system, Personal history of other drug therapy (08/19/2021), Personal history of other endocrine, nutritional and metabolic disease (05/20/2021), Personal history of other endocrine, nutritional and metabolic disease (04/04/2016), Personal history of other infectious and parasitic diseases (05/05/2017), Personal history of other mental and behavioral disorders (04/04/2016), Persons encountering health services in other specified circumstances (05/20/2021),  Preglaucoma, unspecified, 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   Past Surgical History:   Procedure Laterality Date    APPENDECTOMY  04/04/2016    Appendectomy    BELT ABDOMINOPLASTY  04/04/2016    Abdominoplasty    CATARACT EXTRACTION  04/04/2016    Cataract Surgery    OTHER SURGICAL HISTORY  07/01/2022    Knee replacement    TOTAL THYROIDECTOMY  04/04/2016    Thyroid Surgery Total Thyroidectomy      Social History     Tobacco Use    Smoking status: Never    Smokeless tobacco: Never   Substance Use Topics    Alcohol use: Not Currently    Drug use: Never      family history includes Accidental death in her father; Breast cancer in her mother; CVA in her brother.  Pain Management Panel  More data exists         Latest Ref Rng & Units 8/31/2023 9/7/2022   Pain Management Panel   Amphetamine Screen, Urine NEGATIVE PRESUMPTIVE NEGATIVE  PRESUMPTIVE NEGATIVE    Barbiturate Screen, Urine NEGATIVE PRESUMPTIVE NEGATIVE  PRESUMPTIVE NEGATIVE    Codeine IgE Cutoff <50 ng/mL <50  <50    Hydromorphone Urine Cutoff <25 ng/mL <25  <25    Morphine  Cutoff <50 ng/mL <50  <50         Vitals:    12/11/23 1342   BP: 150/86   Pulse: 97     Lab Results   Component Value Date    TSH 1.35 08/22/2023       PHYSICAL EXAM      NODES   HEART  LUNGS  ABDOMEN   VASCULAR  NEURO   SKIN  JOINTS joint are oa only and no synovitis     PLAN  Diagnoses and all orders for this visit:  Generalized osteoarthritis of multiple sites  -     Drug Screen, Urine With Reflex to Confirmation; Future  -     pregabalin (Lyrica) 150 mg capsule; Take 1 capsule (150 mg) by mouth once daily.  -     traMADol (Ultram) 50 mg tablet; Take 1 tablet (50 mg) by mouth 3 times a day as needed for moderate pain (4  - 6). Do not start before January 11, 2024.  Arthralgia, unspecified joint  -     Drug Screen, Urine With Reflex to Confirmation; Future    Routine follow-up for her chronic pain which includes generalized osteoarthritis and degenerative disc disease  Her Parkinson's is stable and her meds have been decreased because of potential side effect    She will continue with her tramadol her urine tox screen is up-to-date   Her OARRS was reviewed and is okay    No need for Lyrica   I will see her back in 3 months

## 2024-11-20 ENCOUNTER — APPOINTMENT (OUTPATIENT)
Dept: RHEUMATOLOGY | Facility: CLINIC | Age: 89
End: 2024-11-20
Payer: MEDICARE

## 2024-11-20 VITALS
DIASTOLIC BLOOD PRESSURE: 67 MMHG | HEIGHT: 60 IN | WEIGHT: 91 LBS | SYSTOLIC BLOOD PRESSURE: 125 MMHG | OXYGEN SATURATION: 96 % | BODY MASS INDEX: 17.87 KG/M2 | HEART RATE: 87 BPM

## 2024-11-20 DIAGNOSIS — M54.50 CHRONIC LOW BACK PAIN, UNSPECIFIED BACK PAIN LATERALITY, UNSPECIFIED WHETHER SCIATICA PRESENT: Primary | ICD-10-CM

## 2024-11-20 DIAGNOSIS — Z96.651 STATUS POST RIGHT KNEE REPLACEMENT: ICD-10-CM

## 2024-11-20 DIAGNOSIS — G89.29 CHRONIC LOW BACK PAIN, UNSPECIFIED BACK PAIN LATERALITY, UNSPECIFIED WHETHER SCIATICA PRESENT: Primary | ICD-10-CM

## 2024-11-20 DIAGNOSIS — M15.9 GENERALIZED OSTEOARTHRITIS OF MULTIPLE SITES: ICD-10-CM

## 2024-11-20 PROCEDURE — 1157F ADVNC CARE PLAN IN RCRD: CPT | Performed by: INTERNAL MEDICINE

## 2024-11-20 PROCEDURE — 1123F ACP DISCUSS/DSCN MKR DOCD: CPT | Performed by: INTERNAL MEDICINE

## 2024-11-20 PROCEDURE — 3074F SYST BP LT 130 MM HG: CPT | Performed by: INTERNAL MEDICINE

## 2024-11-20 PROCEDURE — 1125F AMNT PAIN NOTED PAIN PRSNT: CPT | Performed by: INTERNAL MEDICINE

## 2024-11-20 PROCEDURE — 99213 OFFICE O/P EST LOW 20 MIN: CPT | Performed by: INTERNAL MEDICINE

## 2024-11-20 PROCEDURE — 3078F DIAST BP <80 MM HG: CPT | Performed by: INTERNAL MEDICINE

## 2024-11-20 PROCEDURE — 1159F MED LIST DOCD IN RCRD: CPT | Performed by: INTERNAL MEDICINE

## 2024-11-20 PROCEDURE — 1036F TOBACCO NON-USER: CPT | Performed by: INTERNAL MEDICINE

## 2024-11-20 RX ORDER — TRAMADOL HYDROCHLORIDE 50 MG/1
50 TABLET ORAL 3 TIMES DAILY
Qty: 90 TABLET | Refills: 0 | Status: SHIPPED | OUTPATIENT
Start: 2024-11-20

## 2024-11-20 ASSESSMENT — PAIN SCALES - GENERAL: PAINLEVEL_OUTOF10: 3

## 2024-11-23 ASSESSMENT — SLIT LAMP EXAM - LIDS
COMMENTS: GOOD POSITION, MILD MGD
COMMENTS: GOOD POSITION, MILD MGD

## 2024-11-23 ASSESSMENT — CUP TO DISC RATIO
OS_RATIO: .5
OD_RATIO: .65

## 2024-11-23 ASSESSMENT — EXTERNAL EXAM - RIGHT EYE: OD_EXAM: NORMAL

## 2024-11-23 ASSESSMENT — EXTERNAL EXAM - LEFT EYE: OS_EXAM: NORMAL

## 2024-11-23 NOTE — PROGRESS NOTES
Primary open angle glaucoma of right eye, moderate [id number]  Primary open angle glaucoma of left eye, mild [id number]  -(+)FH glaucoma - brother  -Pachymetry - 500/497  -OCT RNFL (11/26/24) - OD: Thin S/I. OS: Bord T. Stable from 1/30/24, possible mild progression OD compared to 8/30/22.    -HVF 24-2 (11/26/24) - OD: 5/16FL 3%FP 2%FN. Scattered, possible inf/sup arcuate. OS: 1/15FL. Scattered. Stable compared to 9/3/19.   -Plan: Disc appearance stable. Reports good compliance with drops. Patient with mild increase in IOP s/p steroid injection in back on 3/20/21 (dexamethasone, last injection was methylprednisolone 2/20/23). Per patient, she is no longer getting steroid injections.  -Patient was on Timolol 0.25% OU BID. Added Brimonidine 0.2% OU BID (3/21/23). IOP higher than ideal with mild progression on OCT RNFL. Would recommend continuing Brimonidine OU BID. Stopped Timolol (1/30/24), and started dorzolamide-timolol (1/30/24) OU BID. IOP improved. Previously discussed eye drop administration device to help with squeezing bottle due to arthritis. History of putting brimonidine bottle in hot water to make it easier to squeeze, recommended not doing this due to risk of altering medication/contamination, etc.   -IOP and testing stable.   -F/u 6 months for IOP check and OCT RNFL.    Diabetes ueiwdoyzY38.9  -HbA1c= 6.0 (5/10/24). No diabetic retinopathy seen on last dilated exam. Continue close monitoring of blood glucose, blood pressure, and cholesterol. Plan for annual dilated eye exam.    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 Monitor.     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   -Refraction performed today for diagnostic purposes only and without specific intent to dispense Rx. Glasses Rx not dispensed today.

## 2024-11-26 ENCOUNTER — APPOINTMENT (OUTPATIENT)
Dept: OPHTHALMOLOGY | Facility: CLINIC | Age: 89
End: 2024-11-26
Payer: MEDICARE

## 2024-11-26 DIAGNOSIS — H52.13 MYOPIA OF BOTH EYES: ICD-10-CM

## 2024-11-26 DIAGNOSIS — E11.9 DIABETES MELLITUS WITHOUT COMPLICATION (MULTI): ICD-10-CM

## 2024-11-26 DIAGNOSIS — H52.201 ASTIGMATISM OF RIGHT EYE, UNSPECIFIED TYPE: ICD-10-CM

## 2024-11-26 DIAGNOSIS — Z96.1 PSEUDOPHAKIA: ICD-10-CM

## 2024-11-26 DIAGNOSIS — H40.1121 PRIMARY OPEN ANGLE GLAUCOMA OF LEFT EYE, MILD STAGE: ICD-10-CM

## 2024-11-26 DIAGNOSIS — H02.889 MEIBOMIAN GLAND DYSFUNCTION: ICD-10-CM

## 2024-11-26 DIAGNOSIS — H40.1112 PRIMARY OPEN ANGLE GLAUCOMA OF RIGHT EYE, MODERATE STAGE: Primary | ICD-10-CM

## 2024-11-26 DIAGNOSIS — H52.4 PRESBYOPIA: ICD-10-CM

## 2024-11-26 PROCEDURE — 92083 EXTENDED VISUAL FIELD XM: CPT | Performed by: OPHTHALMOLOGY

## 2024-11-26 PROCEDURE — 99214 OFFICE O/P EST MOD 30 MIN: CPT | Performed by: OPHTHALMOLOGY

## 2024-11-26 PROCEDURE — 92133 CPTRZD OPH DX IMG PST SGM ON: CPT | Performed by: OPHTHALMOLOGY

## 2024-11-26 RX ORDER — DORZOLAMIDE HYDROCHLORIDE AND TIMOLOL MALEATE 20; 5 MG/ML; MG/ML
1 SOLUTION/ DROPS OPHTHALMIC 2 TIMES DAILY
Qty: 30 ML | Refills: 1 | Status: SHIPPED | OUTPATIENT
Start: 2024-11-26 | End: 2025-11-26

## 2024-11-26 RX ORDER — BRIMONIDINE TARTRATE 2 MG/ML
1 SOLUTION/ DROPS OPHTHALMIC 2 TIMES DAILY
Qty: 30 ML | Refills: 1 | Status: SHIPPED | OUTPATIENT
Start: 2024-11-26

## 2024-11-26 ASSESSMENT — REFRACTION_MANIFEST
OD_ADD: +2.50
OS_CYLINDER: SPHERE
OS_ADD: +2.50
OD_AXIS: 075
OS_SPHERE: -0.25
OD_SPHERE: -0.50
OD_CYLINDER: -0.75

## 2024-11-26 ASSESSMENT — ENCOUNTER SYMPTOMS
CONSTITUTIONAL NEGATIVE: 0
HEMATOLOGIC/LYMPHATIC NEGATIVE: 0
PSYCHIATRIC NEGATIVE: 0
NEUROLOGICAL NEGATIVE: 0
RESPIRATORY NEGATIVE: 0
MUSCULOSKELETAL NEGATIVE: 0
ENDOCRINE NEGATIVE: 0
ALLERGIC/IMMUNOLOGIC NEGATIVE: 0
GASTROINTESTINAL NEGATIVE: 0
CARDIOVASCULAR NEGATIVE: 0

## 2024-11-26 ASSESSMENT — VISUAL ACUITY
OS_SC: 20/40
METHOD: SNELLEN - LINEAR
OD_SC: 20/40

## 2024-11-26 ASSESSMENT — TONOMETRY
IOP_METHOD: GOLDMANN APPLANATION
OD_IOP_MMHG: 15
OS_IOP_MMHG: 14

## 2024-11-26 ASSESSMENT — REFRACTION_WEARINGRX
OD_SPHERE: OTC NVO
OS_SPHERE: OTC NVO

## 2024-12-10 DIAGNOSIS — G89.29 CHRONIC LOW BACK PAIN WITH SCIATICA, SCIATICA LATERALITY UNSPECIFIED, UNSPECIFIED BACK PAIN LATERALITY: ICD-10-CM

## 2024-12-10 DIAGNOSIS — M54.40 CHRONIC LOW BACK PAIN WITH SCIATICA, SCIATICA LATERALITY UNSPECIFIED, UNSPECIFIED BACK PAIN LATERALITY: ICD-10-CM

## 2024-12-11 RX ORDER — DULOXETIN HYDROCHLORIDE 30 MG/1
30 CAPSULE, DELAYED RELEASE ORAL DAILY
Qty: 90 CAPSULE | Refills: 1 | Status: SHIPPED | OUTPATIENT
Start: 2024-12-11

## 2024-12-19 DIAGNOSIS — I10 BENIGN ESSENTIAL HYPERTENSION: ICD-10-CM

## 2024-12-21 RX ORDER — VALSARTAN 80 MG/1
80 TABLET ORAL DAILY
Qty: 90 TABLET | Refills: 0 | OUTPATIENT
Start: 2024-12-21

## 2024-12-22 DIAGNOSIS — H40.1121 PRIMARY OPEN ANGLE GLAUCOMA OF LEFT EYE, MILD STAGE: ICD-10-CM

## 2024-12-22 DIAGNOSIS — H40.1112 PRIMARY OPEN ANGLE GLAUCOMA OF RIGHT EYE, MODERATE STAGE: ICD-10-CM

## 2024-12-22 RX ORDER — DORZOLAMIDE HYDROCHLORIDE AND TIMOLOL MALEATE 20; 5 MG/ML; MG/ML
1 SOLUTION/ DROPS OPHTHALMIC 2 TIMES DAILY
Qty: 30 ML | Refills: 1 | Status: SHIPPED | OUTPATIENT
Start: 2024-12-22 | End: 2025-12-22

## 2024-12-26 DIAGNOSIS — I10 BENIGN ESSENTIAL HYPERTENSION: ICD-10-CM

## 2024-12-26 RX ORDER — VALSARTAN 80 MG/1
80 TABLET ORAL DAILY
Qty: 90 TABLET | Refills: 3 | Status: SHIPPED | OUTPATIENT
Start: 2024-12-26 | End: 2025-12-26

## 2024-12-26 RX ORDER — VALSARTAN 80 MG/1
80 TABLET ORAL DAILY
Qty: 90 TABLET | Refills: 0 | OUTPATIENT
Start: 2024-12-26

## 2025-02-06 ENCOUNTER — OFFICE VISIT (OUTPATIENT)
Dept: GERIATRIC MEDICINE | Facility: CLINIC | Age: 89
End: 2025-02-06
Payer: MEDICARE

## 2025-02-06 VITALS
BODY MASS INDEX: 18.01 KG/M2 | WEIGHT: 92.2 LBS | DIASTOLIC BLOOD PRESSURE: 96 MMHG | RESPIRATION RATE: 16 BRPM | HEART RATE: 76 BPM | TEMPERATURE: 97.9 F | SYSTOLIC BLOOD PRESSURE: 141 MMHG

## 2025-02-06 DIAGNOSIS — E11.51 TYPE 2 DIABETES MELLITUS WITH DIABETIC PERIPHERAL ANGIOPATHY WITHOUT GANGRENE, WITHOUT LONG-TERM CURRENT USE OF INSULIN (MULTI): ICD-10-CM

## 2025-02-06 DIAGNOSIS — F01.B0 VASCULAR DEMENTIA, MODERATE, WITHOUT BEHAVIORAL DISTURBANCE, PSYCHOTIC DISTURBANCE, MOOD DISTURBANCE, AND ANXIETY: Primary | ICD-10-CM

## 2025-02-06 DIAGNOSIS — I10 PRIMARY HYPERTENSION: ICD-10-CM

## 2025-02-06 DIAGNOSIS — N18.9 CHRONIC KIDNEY DISEASE, UNSPECIFIED CKD STAGE: ICD-10-CM

## 2025-02-06 DIAGNOSIS — E46 PROTEIN-CALORIE MALNUTRITION, UNSPECIFIED SEVERITY (MULTI): ICD-10-CM

## 2025-02-06 DIAGNOSIS — K21.9 GASTROESOPHAGEAL REFLUX DISEASE WITHOUT ESOPHAGITIS: ICD-10-CM

## 2025-02-06 DIAGNOSIS — E03.9 HYPOTHYROIDISM, UNSPECIFIED TYPE: ICD-10-CM

## 2025-02-06 DIAGNOSIS — Z13.21 ENCOUNTER FOR VITAMIN DEFICIENCY SCREENING: ICD-10-CM

## 2025-02-06 DIAGNOSIS — M79.10 SORE MUSCLES: ICD-10-CM

## 2025-02-06 DIAGNOSIS — E86.0 DEHYDRATION: ICD-10-CM

## 2025-02-06 DIAGNOSIS — E55.9 VITAMIN D DEFICIENCY, UNSPECIFIED: ICD-10-CM

## 2025-02-06 PROCEDURE — 3077F SYST BP >= 140 MM HG: CPT | Performed by: NURSE PRACTITIONER

## 2025-02-06 PROCEDURE — 1157F ADVNC CARE PLAN IN RCRD: CPT | Performed by: NURSE PRACTITIONER

## 2025-02-06 PROCEDURE — 1123F ACP DISCUSS/DSCN MKR DOCD: CPT | Performed by: NURSE PRACTITIONER

## 2025-02-06 PROCEDURE — 99215 OFFICE O/P EST HI 40 MIN: CPT | Performed by: NURSE PRACTITIONER

## 2025-02-06 PROCEDURE — 3080F DIAST BP >= 90 MM HG: CPT | Performed by: NURSE PRACTITIONER

## 2025-02-06 ASSESSMENT — ENCOUNTER SYMPTOMS
OCCASIONAL FEELINGS OF UNSTEADINESS: 0
DEPRESSION: 0
LOSS OF SENSATION IN FEET: 0

## 2025-02-06 ASSESSMENT — PATIENT HEALTH QUESTIONNAIRE - PHQ9
1. LITTLE INTEREST OR PLEASURE IN DOING THINGS: NOT AT ALL
SUM OF ALL RESPONSES TO PHQ9 QUESTIONS 1 AND 2: 0
2. FEELING DOWN, DEPRESSED OR HOPELESS: NOT AT ALL

## 2025-02-06 NOTE — PROGRESS NOTES
Subjective   Patient ID: Lewis Rose is a 89 y.o. female who presents for Follow-up.      Lewis Rose is a pleasant 89 y.o.  female who presents today for a geriatric medicine and primary care follow-up. Their last visit was 8/22/2024.  Today, patient presents for: Follow-up. Today, they are accompanied by daughter, Allison, who is their supporting historian.  Patient reports having increased soreness of upper extremities.  Unsure if it is due to lifting small dumbbell weights.  Patient's blood pressure is elevated 141/96.  After time blood pressure rechecked manually blood pressure was 146/84.  Patient reports she does not add salt to her food.  Reports recently started drinking V8 juice.  Will check for a low-salt version.  Patient encouraged to monitor blood pressure at home.  Daughter states she will assist patient with obtaining an [employer] blood pressure cuff.  Patient down 4 pounds since August.  Blood work due today.  Will check TSH, CK.    Previous visit note: 8/22/2024   Lewis Rose is a pleasant 89 y.o.  female who presents for geriatric medicine follow-up. She is accompanied by sheridan hired  and . Last visit was 6/6/2024. uPt and  deny any acute concerns/issues. MA reports pt with elevated BP, but was 126/78 when retaken later with manual cuff. Measurement similar to previous day's BP. Patient down 3 lbs since July. Denies change in appetite, illness. Briefly discussed BP medications and confirmed decreasing Diovan from 160 mg to 80 mg.      Current Outpatient Medications on File Prior to Visit   Medication Sig Dispense Refill    acetaminophen (Tylenol) 500 mg tablet Take 1 tablet (500 mg) by mouth every 6 hours if needed for mild pain (1 - 3).      amLODIPine (Norvasc) 5 mg tablet Take 1 tablet (5 mg) by mouth once daily. 90 tablet 3    ammonium lactate (Amlactin) 12 % cream Apply topically if needed.      brimonidine (AlphaGAN) 0.2 % ophthalmic solution Administer  1 drop into both eyes 2 times a day. 30 mL 1    carbidopa-levodopa (Sinemet)  mg tablet Take 1 tablet by mouth 3 times a day.      cholecalciferol (Vitamin D-3) 125 MCG (5000 UT) capsule Take 1 capsule (125 mcg) by mouth once daily.      dorzolamide-timoloL (Cosopt) 22.3-6.8 mg/mL ophthalmic solution ADMINISTER 1 DROP INTO BOTH EYES 2 TIMES A DAY. 30 mL 1    DULoxetine (Cymbalta) 30 mg DR capsule TAKE 1 CAPSULE (30 MG) BY MOUTH ONCE DAILY. DO NOT CRUSH OR CHEW. 90 capsule 1    hydroCHLOROthiazide (HYDRODiuril) 25 mg tablet Take 1 tablet (25 mg) by mouth early in the morning.. 90 tablet 1    levothyroxine (Synthroid, Levoxyl) 75 mcg tablet TAKE 1 TABLET BY MOUTH EVERY DAY TAKE 6 DAYS PER WEEK ONLY 77 tablet 2    omeprazole OTC (PriLOSEC OTC) 20 mg EC tablet Take 1 tablet (20 mg) by mouth once daily in the morning. Take before meals. Do not crush, chew, or split.      traMADol (Ultram) 50 mg tablet Take 1 tablet (50 mg) by mouth 3 times a day. 90 tablet 0    triamcinolone (Kenalog) 0.1 % cream if needed.      valsartan (Diovan) 80 mg tablet Take 1 tablet (80 mg) by mouth once daily. 90 tablet 3     No current facility-administered medications on file prior to visit.     Allergies   Allergen Reactions    Adhesive Tape-Silicones Hives    Sitagliptin Unknown     Blistering rash      Patient Active Problem List   Diagnosis    Keyes's esophagus without dysplasia    Myopia    Astigmatism    Primary hypertension    Bilateral sensorineural hearing loss    Chronic back pain    Degenerative disc disease, thoracic    Dyslipidemia    Gastroesophageal reflux disease    Generalized osteoarthritis of multiple sites    Hypothyroid    Lumbar paraspinal muscle spasm    Lumbar radiculopathy    Muscle weakness    Osteoarthritis, lower leg, localized    Parkinsons disease (Multi)    Post-menopausal osteoporosis    Primary open angle glaucoma of right eye, moderate stage    Status post right knee replacement    Thoracic spine pain     Protein-calorie malnutrition, unspecified severity (Multi)    Spinal enthesopathy, lumbosacral region (CMS-HCC)    Major depressive disorder, recurrent, mild (CMS-[facility])    Type 2 diabetes mellitus with diabetic peripheral angiopathy without gangrene, without long-term current use of insulin (Multi)    Acquired hammer toe of left foot    Agitated depression (Multi)    Anxiety disorder, unspecified    Arthralgia of left foot    Cervical spondylosis    Cervicalgia    Change in bowel habit    Chronic kidney disease    Complication of internal fixation device (CMS-[facility])    Conjunctival cysts, left eye    Coronary artery disease    Dietary fructose intolerance    Dry eyes    Disorder of bursae of shoulder region    Difficulty taking medication    Hemorrhage of rectum and anus    Nuclear sclerotic cataract of both eyes    Mixed hyperlipidemia    Meibomian gland dysfunction    Meibomian gland dysfunction (MGD) of both eyes    Malignant melanoma of skin, unspecified    Hx of adenomatous colonic polyps    Major depressive disorder, single episode, unspecified    Panic disorder without agoraphobia    Presence of left artificial knee joint    Weight loss, unintentional    Vitamin D deficiency, unspecified    Unilateral primary osteoarthritis, right knee    Diabetes mellitus without complication (Multi)    Sleep apnea    Sciatica    Primary open angle glaucoma of left eye, mild stage    Gastroesophageal reflux disease with esophagitis    Vascular dementia, moderate, without behavioral disturbance, psychotic disturbance, mood disturbance, and anxiety    Caregiver burden    Pseudophakia    Presbyopia    Medication management    Encounter for geriatric assessment    Altered mental status    Arthralgia    Candidiasis of mouth    Contact with and (suspected) exposure to covid-19    Cortical senile cataract    Diverticular disease    Elevated lipids    Hemodynamic instability    Herpes zoster    History of cataract    History of  malignant melanoma    Conductive hearing loss, bilateral    Dehydration    Oropharyngeal dysphagia    Skin changes due to chronic exposure to nonionizing radiation, unspecified    Dry skin     Past Medical History:   Diagnosis Date    Age-related nuclear cataract, left eye 04/04/2016    Age-related nuclear cataract of left eye    Age-related nuclear cataract, right eye 04/04/2016    Age-related nuclear cataract of right eye    Anxiety     Arthritis     Conjunctival cysts, left eye 10/02/2015    Conjunctival cyst of left eye    Cortical age-related cataract, left eye 10/02/2015    Cortical age-related cataract of left eye    Cortical age-related cataract, right eye 10/08/2015    Cortical age-related cataract of right eye    Diabetes mellitus (Multi)     Disease of thyroid gland     Dry eye syndrome of bilateral lacrimal glands     Dry eyes    Encounter for general adult medical examination without abnormal findings 09/26/2017    Preventative health care    Encounter for other procedures for purposes other than remedying health state 10/13/2015    Prophylactic measure    Glaucoma     Meibomian gland dysfunction right eye, upper and lower eyelids 08/30/2022    Meibomian gland dysfunction (MGD) of upper and lower lids of both eyes    Other conditions influencing health status     Denial Of Any Significant Medical History    Other specified anxiety disorders 05/20/2021    Depression with anxiety    Pain in unspecified hip 07/10/2020    Painful hip    Pain in unspecified hip 07/02/2019    Painful hip    Pain in unspecified knee 06/09/2021    Knee pain    Parkinson's disease (Multi)     Personal history of malignant melanoma of skin 04/04/2016    History of malignant melanoma    Personal history of other diseases of the musculoskeletal system and connective tissue 06/03/2018    History of neck pain    Personal history of other diseases of the nervous system and sense organs     History of cataract    Personal history of  other diseases of urinary system     History of hematuria    Personal history of other drug therapy 08/19/2021    History of drug therapy    Personal history of other endocrine, nutritional and metabolic disease 05/20/2021    History of elevated lipids    Personal history of other endocrine, nutritional and metabolic disease 04/04/2016    History of diabetes mellitus    Personal history of other infectious and parasitic diseases 05/05/2017    History of candidiasis of mouth    Personal history of other mental and behavioral disorders 04/04/2016    History of anxiety    Persons encountering health services in other specified circumstances 05/20/2021    Encounter to establish care with new doctor    Preglaucoma, unspecified, bilateral 07/30/2015    Glaucoma suspect of both eyes    Preglaucoma, unspecified, bilateral 04/04/2016    Glaucoma suspect of both eyes    Preglaucoma, unspecified, bilateral 08/03/2015    Glaucoma suspect of both eyes    Presence of intraocular lens 05/07/2018    Pseudophakia of left eye    Unspecified blepharitis left eye, unspecified eyelid 04/04/2016    Blepharitis of left eye    Unspecified blepharitis right eye, unspecified eyelid 04/04/2016    Blepharitis of right eye     Past Surgical History:   Procedure Laterality Date    APPENDECTOMY  04/04/2016    Appendectomy    BELT ABDOMINOPLASTY  04/04/2016    Abdominoplasty    CATARACT EXTRACTION  04/04/2016    Cataract Surgery    OTHER SURGICAL HISTORY  07/01/2022    Knee replacement    TOTAL THYROIDECTOMY  04/04/2016    Thyroid Surgery Total Thyroidectomy     Family History   Problem Relation Name Age of Onset    Breast cancer Mother      Accidental death Father      Other (CVA) Brother       Social Drivers of Health     Tobacco Use: Low Risk  (11/26/2024)    Patient History     Smoking Tobacco Use: Never     Smokeless Tobacco Use: Never     Passive Exposure: Not on file   Alcohol Use: Alcohol Misuse (5/10/2024)    AUDIT-C     Frequency of  Alcohol Consumption: 2-4 times a month     Average Number of Drinks: 1 or 2     Frequency of Binge Drinking: Weekly   Financial Resource Strain: Low Risk  (5/10/2024)    Overall Financial Resource Strain (CARDIA)     Difficulty of Paying Living Expenses: Not hard at all   Food Insecurity: Not on file   Transportation Needs: No Transportation Needs (5/10/2024)    PRAPARE - Transportation     Lack of Transportation (Medical): No     Lack of Transportation (Non-Medical): No   Physical Activity: Unknown (5/10/2024)    Exercise Vital Sign     Days of Exercise per Week: 0 days     Minutes of Exercise per Session: Not on file   Stress: Not on file   Social Connections: Not on file   Intimate Partner Violence: Not on file   Depression: Not at risk (2/6/2025)    PHQ-2     PHQ-2 Score: 0   Housing Stability: Low Risk  (5/10/2024)    Housing Stability Vital Sign     Unable to Pay for Housing in the Last Year: No     Number of Places Lived in the Last Year: 1     Unstable Housing in the Last Year: No   Utilities: Not on file   Digital Equity: Not on file   Health Literacy: Not on file       Review of Systems   Constitutional:  Positive for appetite change (DTR reports pt eats sml portions) and unexpected weight change (4 lb wt loss since Aug).   Musculoskeletal:  Positive for myalgias (BUE muscle aches).       Objective   Vitals:    02/06/25 1448   BP: (!) 141/96   BP Location: Left arm   Patient Position: Sitting   BP Cuff Size: Child   Pulse: 76   Resp: 16   Temp: 36.6 °C (97.9 °F)   TempSrc: Tympanic   Weight: (!) 41.8 kg (92 lb 3.2 oz)       Physical Exam  Vitals (BMI 18.83) reviewed.   Constitutional:       General: She is awake. She is not in acute distress.     Appearance: She is well-groomed and underweight. She is not ill-appearing, toxic-appearing or diaphoretic.   HENT:      Nose: Nose normal.   Eyes:      Conjunctiva/sclera: Conjunctivae normal.   Cardiovascular:      Pulses: Normal pulses.      Heart sounds: Normal  heart sounds.   Pulmonary:      Effort: Pulmonary effort is normal.      Breath sounds: Normal breath sounds.   Abdominal:      General: Abdomen is flat. Bowel sounds are normal.   Musculoskeletal:         General: Normal range of motion.   Skin:     General: Skin is warm and dry.      Coloration: Skin is ashen.      Comments: Generalized dry, flaky, friable   Neurological:      Mental Status: She is alert. Mental status is at baseline.   Psychiatric:         Attention and Perception: Attention normal.         Mood and Affect: Mood normal.         Speech: Speech normal.         Behavior: Behavior normal. Behavior is cooperative.         Thought Content: Thought content normal.         Cognition and Memory: Cognition normal. She exhibits impaired recent memory.         Assessment/Plan   Problem List Items Addressed This Visit             ICD-10-CM    Primary hypertension I10    Relevant Orders    CBC    Follow Up In Geriatrics    Gastroesophageal reflux disease K21.9    Relevant Orders    Follow Up In Geriatrics    Hypothyroid E03.9    Relevant Orders    Tsh With Reflex To Free T4 If Abnormal    Follow Up In Geriatrics    Protein-calorie malnutrition, unspecified severity (Multi) E46    Relevant Orders    CBC    Comprehensive Metabolic Panel    Follow Up In Geriatrics    Type 2 diabetes mellitus with diabetic peripheral angiopathy without gangrene, without long-term current use of insulin (Multi) E11.51    Relevant Orders    Hemoglobin A1c    Follow Up In Geriatrics    Chronic kidney disease N18.9    Relevant Orders    CBC    Comprehensive Metabolic Panel    Follow Up In Geriatrics    Vitamin D deficiency, unspecified E55.9    Relevant Orders    Follow Up In Geriatrics    Vitamin D 25-Hydroxy,Total (for eval of Vitamin D levels)    Vascular dementia, moderate, without behavioral disturbance, psychotic disturbance, mood disturbance, and anxiety - Primary F01.B0    Relevant Orders    Follow Up In Geriatrics     Dehydration E86.0    Relevant Orders    CBC    Follow Up In Geriatrics     Other Visit Diagnoses         Codes    Sore muscles     M79.10    Relevant Orders    CK    Follow Up In Geriatrics    Encounter for vitamin deficiency screening     Z13.21    Relevant Orders    Vitamin B12            Time Spent  Prep time on day of patient encounter: 0 minutes  Time spent directly with patient, family or caregiver: 40 minutes  Additional Time Spent on Patient Care Activities: 0 minutes  Documentation Time: 20 minutes  Other Time Spent: 0 minutes  Total: 60 minutes          ERICA Prado 02/09/25 6:10 PM

## 2025-02-09 ASSESSMENT — ENCOUNTER SYMPTOMS
MYALGIAS: 1
UNEXPECTED WEIGHT CHANGE: 1
APPETITE CHANGE: 1

## 2025-02-22 LAB
25(OH)D3+25(OH)D2 SERPL-MCNC: 69 NG/ML (ref 30–100)
ALBUMIN SERPL-MCNC: 4.4 G/DL (ref 3.6–5.1)
ALP SERPL-CCNC: 87 U/L (ref 37–153)
ALT SERPL-CCNC: 5 U/L (ref 6–29)
ANION GAP SERPL CALCULATED.4IONS-SCNC: 11 MMOL/L (CALC) (ref 7–17)
AST SERPL-CCNC: 11 U/L (ref 10–35)
BILIRUB SERPL-MCNC: 0.6 MG/DL (ref 0.2–1.2)
BUN SERPL-MCNC: 25 MG/DL (ref 7–25)
CALCIUM SERPL-MCNC: 10.1 MG/DL (ref 8.6–10.4)
CHLORIDE SERPL-SCNC: 96 MMOL/L (ref 98–110)
CK SERPL-CCNC: 21 U/L (ref 16–215)
CO2 SERPL-SCNC: 29 MMOL/L (ref 20–32)
CREAT SERPL-MCNC: 0.9 MG/DL (ref 0.6–0.95)
EGFRCR SERPLBLD CKD-EPI 2021: 62 ML/MIN/1.73M2
ERYTHROCYTE [DISTWIDTH] IN BLOOD BY AUTOMATED COUNT: 12 % (ref 11–15)
EST. AVERAGE GLUCOSE BLD GHB EST-MCNC: 137 MG/DL
EST. AVERAGE GLUCOSE BLD GHB EST-SCNC: 7.6 MMOL/L
GLUCOSE SERPL-MCNC: 145 MG/DL (ref 65–139)
HBA1C MFR BLD: 6.4 % OF TOTAL HGB
HCT VFR BLD AUTO: 41.3 % (ref 35–45)
HGB BLD-MCNC: 14.2 G/DL (ref 11.7–15.5)
MCH RBC QN AUTO: 32.2 PG (ref 27–33)
MCHC RBC AUTO-ENTMCNC: 34.4 G/DL (ref 32–36)
MCV RBC AUTO: 93.7 FL (ref 80–100)
PLATELET # BLD AUTO: 418 THOUSAND/UL (ref 140–400)
PMV BLD REES-ECKER: 9.2 FL (ref 7.5–12.5)
POTASSIUM SERPL-SCNC: 4 MMOL/L (ref 3.5–5.3)
PROT SERPL-MCNC: 7.2 G/DL (ref 6.1–8.1)
RBC # BLD AUTO: 4.41 MILLION/UL (ref 3.8–5.1)
SODIUM SERPL-SCNC: 136 MMOL/L (ref 135–146)
TSH SERPL-ACNC: 0.81 MIU/L (ref 0.4–4.5)
WBC # BLD AUTO: 8.5 THOUSAND/UL (ref 3.8–10.8)

## 2025-03-04 DIAGNOSIS — G20.A1 PARKINSON'S DISEASE, UNSPECIFIED WHETHER DYSKINESIA PRESENT, UNSPECIFIED WHETHER MANIFESTATIONS FLUCTUATE: ICD-10-CM

## 2025-03-04 PROBLEM — F01.A0 MILD VASCULAR DEMENTIA WITHOUT BEHAVIORAL DISTURBANCE, PSYCHOTIC DISTURBANCE, MOOD DISTURBANCE, OR ANXIETY: Status: ACTIVE | Noted: 2022-12-06

## 2025-03-04 RX ORDER — CARBIDOPA AND LEVODOPA 25; 100 MG/1; MG/1
1 TABLET ORAL 3 TIMES DAILY
Qty: 90 TABLET | Refills: 2 | Status: SHIPPED | OUTPATIENT
Start: 2025-03-04

## 2025-03-24 ENCOUNTER — APPOINTMENT (OUTPATIENT)
Dept: RHEUMATOLOGY | Facility: CLINIC | Age: 89
End: 2025-03-24
Payer: MEDICARE

## 2025-03-29 DIAGNOSIS — G20.A1 PARKINSON'S DISEASE, UNSPECIFIED WHETHER DYSKINESIA PRESENT, UNSPECIFIED WHETHER MANIFESTATIONS FLUCTUATE: ICD-10-CM

## 2025-03-31 RX ORDER — CARBIDOPA AND LEVODOPA 25; 100 MG/1; MG/1
1 TABLET ORAL 3 TIMES DAILY
Qty: 270 TABLET | Refills: 1 | Status: SHIPPED | OUTPATIENT
Start: 2025-03-31

## 2025-04-06 NOTE — PROGRESS NOTES
Subjective   Patient ID: Lewis Rose is a 88 y.o. female who presents for Osteoarthritis and Pain (FUV-Taking Tramadol 50 PRN and Extra Strength Tylenol PRN. Tox screen up to date. CSA due today. Also, would like to discuss Lyrica.).    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      ammonium lactate (Amlactin) 12 % cream Apply topically if needed.      aspirin 81 mg EC tablet Take 1 tablet (81 mg) by mouth once daily.      brimonidine (AlphaGAN) 0.2 % ophthalmic solution Administer 1 drop into both eyes 2 times a day. 30 mL 0    carbidopa-levodopa (Sinemet)  mg tablet Take 1 tablet by mouth 3 times a day.      DULoxetine (Cymbalta) 30 mg DR capsule Take 1 capsule (30 mg) by mouth once daily. Do not crush or chew.      hydroCHLOROthiazide (HYDRODiuril) 25 mg tablet TAKE 1 TABLET BY MOUTH EVERY DAY 90 tablet 1    levothyroxine (Synthroid, Levoxyl) 75 mcg tablet TAKE 1 TABLET BY MOUTH EVERY DAY TAKE 6 DAYS PER WEEK ONLY 90 tablet 1    omeprazole OTC (PriLOSEC OTC) 20 mg EC tablet Take 1 tablet (20 mg) by mouth once daily in the morning. Take before meals. Do not crush, chew, or split.      timolol (Timoptic) 0.25 % ophthalmic solution INSTILL 1 DROP IN BOTH EYES TWICE DAILY 10 mL 0    valsartan (Diovan) 80 mg tablet TAKE 1 TABLET BY MOUTH ONCE DAILY. 30 tablet 5    pregabalin (Lyrica) 150 mg capsule Take 1 capsule (150 mg) by mouth once daily. 90 capsule 1     [START ON 1/11/2024] traMADol (Ultram) 50 mg tablet Take 1 tablet (50 mg) by mouth 3 times a day as needed for moderate pain (4 - 6). Do not start before January 11, 2024. 90 tablet 1    triamcinolone (Kenalog) 0.1 % cream        No current facility-administered medications for this visit.         has a past medical history of Age-related nuclear cataract, left eye (04/04/2016), Age-related nuclear cataract, right eye (04/04/2016), Anxiety, Arthritis, Conjunctival cysts, left eye (10/02/2015), Cortical age-related cataract, left eye (10/02/2015), Cortical age-related cataract, right eye (10/08/2015), Diabetes mellitus (CMS/[facility]), Disease of thyroid gland, Dry eye syndrome of bilateral lacrimal glands, Encounter for general adult medical examination without abnormal findings (09/26/2017), Encounter for other procedures for purposes other than remedying health state (10/13/2015), Glaucoma, Meibomian gland dysfunction right eye, upper and lower eyelids (08/30/2022), Other conditions influencing health status, Other specified anxiety disorders (05/20/2021), Pain in unspecified hip (07/10/2020), Pain in unspecified hip (07/02/2019), Pain in unspecified knee (06/09/2021), Parkinson's disease, Personal history of malignant melanoma of skin (04/04/2016), Personal history of other diseases of the musculoskeletal system and connective tissue (06/03/2018), Personal history of other diseases of the nervous system and sense organs, Personal history of other diseases of urinary system, Personal history of other drug therapy (08/19/2021), Personal history of other endocrine, nutritional and metabolic disease (05/20/2021), Personal history of other endocrine, nutritional and metabolic disease (04/04/2016), Personal history of other infectious and parasitic diseases (05/05/2017), Personal history of other mental and behavioral disorders (04/04/2016), Persons encountering health services in other specified circumstances (05/20/2021),  Preglaucoma, unspecified, 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   Past Surgical History:   Procedure Laterality Date    APPENDECTOMY  04/04/2016    Appendectomy    BELT ABDOMINOPLASTY  04/04/2016    Abdominoplasty    CATARACT EXTRACTION  04/04/2016    Cataract Surgery    OTHER SURGICAL HISTORY  07/01/2022    Knee replacement    TOTAL THYROIDECTOMY  04/04/2016    Thyroid Surgery Total Thyroidectomy      Social History     Tobacco Use    Smoking status: Never    Smokeless tobacco: Never   Substance Use Topics    Alcohol use: Not Currently    Drug use: Never      family history includes Accidental death in her father; Breast cancer in her mother; CVA in her brother.  Pain Management Panel  More data exists         Latest Ref Rng & Units 8/31/2023 9/7/2022   Pain Management Panel   Amphetamine Screen, Urine NEGATIVE PRESUMPTIVE NEGATIVE  PRESUMPTIVE NEGATIVE    Barbiturate Screen, Urine NEGATIVE PRESUMPTIVE NEGATIVE  PRESUMPTIVE NEGATIVE    Codeine IgE Cutoff <50 ng/mL <50  <50    Hydromorphone Urine Cutoff <25 ng/mL <25  <25    Morphine  Cutoff <50 ng/mL <50  <50         Vitals:    12/11/23 1342   BP: 150/86   Pulse: 97     Lab Results   Component Value Date    TSH 1.35 08/22/2023       PHYSICAL EXAM      NODES   HEART  LUNGS  ABDOMEN   VASCULAR  NEURO   SKIN  JOINTS joint are oa only and no synovitis     PLAN  Diagnoses and all orders for this visit:  Generalized osteoarthritis of multiple sites  -     Drug Screen, Urine With Reflex to Confirmation; Future  -     pregabalin (Lyrica) 150 mg capsule; Take 1 capsule (150 mg) by mouth once daily.  -     traMADol (Ultram) 50 mg tablet; Take 1 tablet (50 mg) by mouth 3 times a day as needed for moderate pain (4  - 6). Do not start before January 11, 2024.  Arthralgia, unspecified joint  -     Drug Screen, Urine With Reflex to Confirmation; Future    Routine follow-up for her chronic pain which includes generalized osteoarthritis and degenerative disc disease  Her Parkinson's is stable and her meds have been decreased because of potential side effect    She will continue with her tramadol her urine tox screen is up-to-date as of today 4-7-2025  Contract signed today  Her OARRS was reviewed and is okay    No need for Lyrica   I will see her back in 3 months

## 2025-04-07 ENCOUNTER — APPOINTMENT (OUTPATIENT)
Dept: RHEUMATOLOGY | Facility: CLINIC | Age: 89
End: 2025-04-07
Payer: MEDICARE

## 2025-04-07 VITALS
SYSTOLIC BLOOD PRESSURE: 142 MMHG | HEART RATE: 76 BPM | WEIGHT: 92 LBS | OXYGEN SATURATION: 100 % | DIASTOLIC BLOOD PRESSURE: 73 MMHG | BODY MASS INDEX: 17.97 KG/M2

## 2025-04-07 DIAGNOSIS — M25.50 ARTHRALGIA, UNSPECIFIED JOINT: ICD-10-CM

## 2025-04-07 DIAGNOSIS — M54.50 CHRONIC LOW BACK PAIN, UNSPECIFIED BACK PAIN LATERALITY, UNSPECIFIED WHETHER SCIATICA PRESENT: Primary | ICD-10-CM

## 2025-04-07 DIAGNOSIS — G89.29 CHRONIC LOW BACK PAIN, UNSPECIFIED BACK PAIN LATERALITY, UNSPECIFIED WHETHER SCIATICA PRESENT: Primary | ICD-10-CM

## 2025-04-07 DIAGNOSIS — G20.A2 PARKINSON'S DISEASE WITH FLUCTUATING MANIFESTATIONS, UNSPECIFIED WHETHER DYSKINESIA PRESENT: ICD-10-CM

## 2025-04-07 DIAGNOSIS — M15.9 GENERALIZED OSTEOARTHRITIS OF MULTIPLE SITES: ICD-10-CM

## 2025-04-07 PROCEDURE — 99214 OFFICE O/P EST MOD 30 MIN: CPT | Performed by: INTERNAL MEDICINE

## 2025-04-07 PROCEDURE — 1123F ACP DISCUSS/DSCN MKR DOCD: CPT | Performed by: INTERNAL MEDICINE

## 2025-04-07 PROCEDURE — 3078F DIAST BP <80 MM HG: CPT | Performed by: INTERNAL MEDICINE

## 2025-04-07 PROCEDURE — 1159F MED LIST DOCD IN RCRD: CPT | Performed by: INTERNAL MEDICINE

## 2025-04-07 PROCEDURE — 3077F SYST BP >= 140 MM HG: CPT | Performed by: INTERNAL MEDICINE

## 2025-04-07 PROCEDURE — 1157F ADVNC CARE PLAN IN RCRD: CPT | Performed by: INTERNAL MEDICINE

## 2025-04-07 RX ORDER — TRAMADOL HYDROCHLORIDE 50 MG/1
50 TABLET ORAL 3 TIMES DAILY
Qty: 90 TABLET | Refills: 0 | Status: SHIPPED | OUTPATIENT
Start: 2025-04-07

## 2025-05-08 ENCOUNTER — OFFICE VISIT (OUTPATIENT)
Dept: GERIATRIC MEDICINE | Facility: CLINIC | Age: 89
End: 2025-05-08
Payer: MEDICARE

## 2025-05-08 VITALS
SYSTOLIC BLOOD PRESSURE: 115 MMHG | WEIGHT: 90.6 LBS | BODY MASS INDEX: 17.69 KG/M2 | HEART RATE: 87 BPM | RESPIRATION RATE: 18 BRPM | DIASTOLIC BLOOD PRESSURE: 75 MMHG | TEMPERATURE: 99.1 F

## 2025-05-08 DIAGNOSIS — E86.0 DEHYDRATION: ICD-10-CM

## 2025-05-08 DIAGNOSIS — I10 PRIMARY HYPERTENSION: ICD-10-CM

## 2025-05-08 DIAGNOSIS — E55.9 VITAMIN D DEFICIENCY, UNSPECIFIED: ICD-10-CM

## 2025-05-08 DIAGNOSIS — N18.9 CHRONIC KIDNEY DISEASE, UNSPECIFIED CKD STAGE: ICD-10-CM

## 2025-05-08 DIAGNOSIS — F32.2 AGITATED DEPRESSION (MULTI): ICD-10-CM

## 2025-05-08 DIAGNOSIS — F01.B0 VASCULAR DEMENTIA, MODERATE, WITHOUT BEHAVIORAL DISTURBANCE, PSYCHOTIC DISTURBANCE, MOOD DISTURBANCE, AND ANXIETY: ICD-10-CM

## 2025-05-08 DIAGNOSIS — E46 PROTEIN-CALORIE MALNUTRITION, UNSPECIFIED SEVERITY (MULTI): ICD-10-CM

## 2025-05-08 DIAGNOSIS — M79.10 SORE MUSCLES: ICD-10-CM

## 2025-05-08 DIAGNOSIS — K21.9 GASTROESOPHAGEAL REFLUX DISEASE WITHOUT ESOPHAGITIS: ICD-10-CM

## 2025-05-08 DIAGNOSIS — E03.9 HYPOTHYROIDISM, UNSPECIFIED TYPE: ICD-10-CM

## 2025-05-08 DIAGNOSIS — E11.51 TYPE 2 DIABETES MELLITUS WITH DIABETIC PERIPHERAL ANGIOPATHY WITHOUT GANGRENE, WITHOUT LONG-TERM CURRENT USE OF INSULIN (MULTI): ICD-10-CM

## 2025-05-08 PROCEDURE — 3078F DIAST BP <80 MM HG: CPT | Performed by: NURSE PRACTITIONER

## 2025-05-08 PROCEDURE — 1160F RVW MEDS BY RX/DR IN RCRD: CPT | Performed by: NURSE PRACTITIONER

## 2025-05-08 PROCEDURE — 99215 OFFICE O/P EST HI 40 MIN: CPT | Performed by: NURSE PRACTITIONER

## 2025-05-08 PROCEDURE — 3074F SYST BP LT 130 MM HG: CPT | Performed by: NURSE PRACTITIONER

## 2025-05-08 PROCEDURE — 1159F MED LIST DOCD IN RCRD: CPT | Performed by: NURSE PRACTITIONER

## 2025-05-08 PROCEDURE — 1126F AMNT PAIN NOTED NONE PRSNT: CPT | Performed by: NURSE PRACTITIONER

## 2025-05-08 ASSESSMENT — PATIENT HEALTH QUESTIONNAIRE - PHQ9
1. LITTLE INTEREST OR PLEASURE IN DOING THINGS: NOT AT ALL
2. FEELING DOWN, DEPRESSED OR HOPELESS: NOT AT ALL
SUM OF ALL RESPONSES TO PHQ9 QUESTIONS 1 AND 2: 0

## 2025-05-08 ASSESSMENT — ENCOUNTER SYMPTOMS
OCCASIONAL FEELINGS OF UNSTEADINESS: 0
LOSS OF SENSATION IN FEET: 0

## 2025-05-08 ASSESSMENT — PAIN SCALES - GENERAL: PAINLEVEL_OUTOF10: 0-NO PAIN

## 2025-05-08 NOTE — PROGRESS NOTES
Division: Geriatrics  Date of Service: 05/08/25   Visit Type: Geriatrics Clinic Follow-up Visit    Subjective   Ms. Lewis Rose is 89 y.o. year old female and here for f/u of Follow-up. Here with hired caregiver. Collateral history obtained due to memory impairment:    Last visit: Per pt discussion/summary:     Previous Visit Note: 2/6/2025   Lewis Rose is a pleasant 89 y.o.  female who presents today for a geriatric medicine and primary care follow-up. Their last visit was 8/22/2024.  Today, patient presents for: Follow-up. Today, they are accompanied by daughter, Allison, who is their supporting historian.  Patient reports having increased soreness of upper extremities.  Unsure if it is due to lifting small dumbbell weights.  Patient's blood pressure is elevated 141/96.  After time blood pressure rechecked manually blood pressure was 146/84.  Patient reports she does not add salt to her food.  Reports recently started drinking V8 juice.  Will check for a low-salt version.  Patient encouraged to monitor blood pressure at home.  Daughter states she will assist patient with obtaining an [employer] blood pressure cuff.  Patient down 4 pounds since August.  Blood work due today.  Will check TSH, CK.    Previous visit note: 8/22/2024   Lewis Rose is a pleasant 89 y.o.  female who presents for geriatric medicine follow-up. She is accompanied by sheridan hired  and . Last visit was 6/6/2024. uPt and  deny any acute concerns/issues. MA reports pt with elevated BP, but was 126/78 when retaken later with manual cuff. Measurement similar to previous day's BP. Patient down 3 lbs since July. Denies change in appetite, illness. Briefly discussed BP medications and confirmed decreasing Diovan from 160 mg to 80 mg.      Lewis Rose is a/an 89 y.o. female who presents today for a geriatric medicine and primary care followed for Mild vascular dementia, Depression, Hypertension, Arthralgia,  Type 2 DM, Sleep apnea, GERD, Bilateral sensorineural hearing loss, Chronic back pain, osteoarthritis, Hypothyroid, Protein-calorie malnutrition, severe, CKD, Dietary fructose intolerance   Vitamin D deficiency,    Anxiety disorder               . PMH significant for Parkinsons disease, glaucoma, Keyes's esophagus, Sciatica, Post-menopausal osteoporosis. Last brain imaging: ***. MoCA/MMSE: ***. Their last visit was *** with Patient presents with:  Follow-up  . Today, they are accompanied by *** who is their primary/supportive historian./Pt came alone so the history is as accurate as and limited by *** own report/memory.    Patient Active Problem List:     Myopia     Astigmatism       Degenerative disc disease, thoracic     Dyslipidemia     Gastroesophageal reflux disease       Lumbar paraspinal muscle spasm     Lumbar radiculopathy     Muscle weakness     Osteoarthritis, lower leg, localized       Primary open angle glaucoma of right eye, moderate stage     Status post right knee replacement     Thoracic spine pain     Spinal enthesopathy, lumbosacral region       Type 2 diabetes mellitus with diabetic peripheral angiopathy without gangrene, without long-term current use of insulin (Multi)     Acquired hammer toe of left foot     Agitated depression (Multi)     Arthralgia of left foot     Cervical spondylosis     Cervicalgia     Change in bowel habit     Complication of internal fixation device     Conjunctival cysts, left eye     Coronary artery disease       Dry eyes     Disorder of bursae of shoulder region     Difficulty taking medication     Hemorrhage of rectum and anus     Nuclear sclerotic cataract of both eyes     Mixed hyperlipidemia     Meibomian gland dysfunction     Meibomian gland dysfunction (MGD) of both eyes     Malignant melanoma of skin, unspecified     Hx of adenomatous colonic polyps     Major depressive disorder, single episode, unspecified     Panic disorder without agoraphobia     Presence  of left artificial knee joint     Weight loss, unintentional     Unilateral primary osteoarthritis, right knee       Caregiver burden     Pseudophakia     Presbyopia     Medication management     Encounter for geriatric assessment     Altered mental status     Candidiasis of mouth     Contact with and (suspected) exposure to covid-19     Cortical senile cataract     Diverticular disease     Elevated lipids     Hemodynamic instability     Herpes zoster     History of cataract     History of malignant melanoma     Conductive hearing loss, bilateral     Dehydration     Oropharyngeal dysphagia     Skin changes due to chronic exposure to nonionizing radiation, unspecified     Dry skin     Blepharitis of eyelid of left eye           History obtained from caregiver:***  Hospitalization/ER visits: No  Memory: Stable  Mood changes: Stable  Sleep: nap day, 45 mn. 9:30/10 pm bedtime. Up early but will go back to sleep  Falls:N  Med Changes/OTC meds:prilosec  Driving:Y  Pain: 5/10 - pulsating muscle spasm, takes warm shower hand held head, shower brush  BM: 05/08/25   Appetite:Good  Weight: Down, trending  Home environment/Living Situation: [place] in [place]    PCP: Kori Cuba APRN-SHANI    Medical records reviewed.  Medical History[1]  Surgical History[2]  Family History[3]  Social History     Tobacco Use   • Smoking status: Never   • Smokeless tobacco: Never   Substance Use Topics   • Alcohol use: Not Currently       Advance Directives/Legal/Financial    Patient Healthcare POA: Children         Is Healthcare POA currently scanned into patient's chart: No      DPOA for finances: Children       Legal guardian:  NA     Living Will: yes     ? no      ENCOUNTER SCREENING RESULTS             Over the past 2 weeks, how often have you been bothered by any of the following problems?  Little interest or pleasure in doing things: Not at all  Feeling down, depressed, or hopeless: Not at all  Patient Health Questionnaire-2 Score: 0    "              MIS: ***/15    Clock Drawing Test (CDT): ***/7 {AGCDT:07802}    Daily Functioning Assessment                       Safety       Living situation:       History of Abuse/Neglect/exploitation: ***      Medications reviewed and reconciled.   Current Medications[4]    Objective   /75   Pulse 87   Temp 37.3 °C (99.1 °F) (Tympanic)   Resp 18   Wt (!) 41.1 kg (90 lb 9.6 oz)   BMI 17.69 kg/m²   Physical Exam      Labs/Imaging reviewed.  Lab Results   Component Value Date    WBC 8.5 02/21/2025    HGB 14.2 02/21/2025    HCT 41.3 02/21/2025    MCV 93.7 02/21/2025     (H) 02/21/2025    LYMPHOPCT 36.8 05/10/2024    RBC 4.41 02/21/2025    MCH 32.2 02/21/2025    MCHC 34.4 02/21/2025    RDW 12.0 02/21/2025     Lab Results   Component Value Date     02/21/2025    K 4.0 02/21/2025    CL 96 (L) 02/21/2025    CO2 29 02/21/2025    BUN 25 02/21/2025    CREATININE 0.90 02/21/2025    GLUCOSE 145 (H) 02/21/2025    CALCIUM 10.1 02/21/2025    PROT 7.2 02/21/2025    BILITOT 0.6 02/21/2025    ALKPHOS 87 02/21/2025    AST 11 02/21/2025    ALT 5 (L) 02/21/2025     Lab Results   Component Value Date    TSH 0.81 02/21/2025    TSH 1.61 05/09/2024    TSH 1.35 08/22/2023     Lab Results   Component Value Date    FREET4 1.35 05/26/2021    FREET4 1.14 10/26/2017     No results found for: \"LLLNUNQH80\"  Lab Results   Component Value Date    HGBA1C 6.4 (H) 02/21/2025    HGBA1C 6.0 (H) 05/10/2024    HGBA1C 5.9 (A) 07/01/2022     Lab Results   Component Value Date    VITD25 69 02/21/2025    VITD25 36 07/01/2022    VITD25 28 (A) 05/26/2021        No results found for this or any previous visit from the past 365 days.     CT head wo IV contrast 05/09/2024    Narrative  Interpreted By:  Branden Lea,  STUDY:  CT HEAD WO IV CONTRAST;  5/9/2024 8:58 pm    INDICATION:  Signs/Symptoms:ams.    COMPARISON:  None    ACCESSION NUMBER(S):  [id number]    ORDERING CLINICIAN:  ANGEL LUIS KANG    TECHNIQUE:  Contiguous axial images " of the head were obtained without intravenous  contrast.    FINDINGS:  The examination is limited secondary to patient motion.    BRAIN PARENCHYMA:  There is cerebral atrophy and chronic  periventricular white matter small vessel ischemic change. Small  hypodensity in posterior limb of left internal capsule compatible  with old lacunar infarct. The gray white matter differentiation is  preserved.  No mass effect or midline shift.    HEMORRHAGE:  No evidence of acute intracranial hemorrhage.  VENTRICLES AND EXTRA-AXIAL SPACES:  The ventricles are within normal  limits in size for brain volume.  No evidence of abnormal extraaxial  fluid collection. EXTRACRANIAL SOFT TISSUES:  Within normal limits.  PARANASAL SINUSES/MASTOIDS:  The visualized paranasal sinuses and  mastoid air cells are clear and well pneumatized. CALVARIUM:  No  evidence of depressed calvarial fracture.    OTHER FINDINGS:  None    Impression  Cerebral atrophy and chronic periventricular white matter small  vessel ischemic change.    No evidence of acute intracranial hemorrhage.        MACRO:  None    Signed by: Branden Lea 5/9/2024 9:10 PM  Dictation workstation:   [id number]     No results found for this or any previous visit from the past 365 days.      Assessment/Plan    {Assess/PlanSmartLinks:64024}    Discussed case with: {AGHXFROM:77392}    ***          Electronically signed by: KEMAR Prado-SHANI           [1]  Past Medical History:  Diagnosis Date   • Age-related nuclear cataract, left eye 04/04/2016    Age-related nuclear cataract of left eye   • Age-related nuclear cataract, right eye 04/04/2016    Age-related nuclear cataract of right eye   • Anxiety    • Arthritis    • Conjunctival cysts, left eye 10/02/2015    Conjunctival cyst of left eye   • Cortical age-related cataract, left eye 10/02/2015    Cortical age-related cataract of left eye   • Cortical age-related cataract, right eye 10/08/2015    Cortical age-related cataract of  right eye   • Diabetes mellitus (Multi)    • Disease of thyroid gland    • Dry eye syndrome of bilateral lacrimal glands     Dry eyes   • Encounter for general adult medical examination without abnormal findings 09/26/2017    Preventative health care   • Encounter for other procedures for purposes other than remedying health state 10/13/2015    Prophylactic measure   • Glaucoma    • Meibomian gland dysfunction right eye, upper and lower eyelids 08/30/2022    Meibomian gland dysfunction (MGD) of upper and lower lids of both eyes   • Other conditions influencing health status     Denial Of Any Significant Medical History   • Other specified anxiety disorders 05/20/2021    Depression with anxiety   • Pain in unspecified hip 07/10/2020    Painful hip   • Pain in unspecified hip 07/02/2019    Painful hip   • Pain in unspecified knee 06/09/2021    Knee pain   • Parkinson's disease    • Personal history of malignant melanoma of skin 04/04/2016    History of malignant melanoma   • Personal history of other diseases of the musculoskeletal system and connective tissue 06/03/2018    History of neck pain   • Personal history of other diseases of the nervous system and sense organs     History of cataract   • Personal history of other diseases of urinary system     History of hematuria   • Personal history of other drug therapy 08/19/2021    History of drug therapy   • Personal history of other endocrine, nutritional and metabolic disease 05/20/2021    History of elevated lipids   • Personal history of other endocrine, nutritional and metabolic disease 04/04/2016    History of diabetes mellitus   • Personal history of other infectious and parasitic diseases 05/05/2017    History of candidiasis of mouth   • Personal history of other mental and behavioral disorders 04/04/2016    History of anxiety   • Persons encountering health services in other specified circumstances 05/20/2021    Encounter to establish care with new doctor   •  Preglaucoma, unspecified, bilateral 07/30/2015    Glaucoma suspect of both eyes   • Preglaucoma, unspecified, bilateral 04/04/2016    Glaucoma suspect of both eyes   • Preglaucoma, unspecified, bilateral 08/03/2015    Glaucoma suspect of both eyes   • Presence of intraocular lens 05/07/2018    Pseudophakia of left eye   • Unspecified blepharitis left eye, unspecified eyelid 04/04/2016    Blepharitis of left eye   • Unspecified blepharitis right eye, unspecified eyelid 04/04/2016    Blepharitis of right eye   [2]  Past Surgical History:  Procedure Laterality Date   • APPENDECTOMY  04/04/2016    Appendectomy   • BELT ABDOMINOPLASTY  04/04/2016    Abdominoplasty   • CATARACT EXTRACTION  04/04/2016    Cataract Surgery   • OTHER SURGICAL HISTORY  07/01/2022    Knee replacement   • TOTAL THYROIDECTOMY  04/04/2016    Thyroid Surgery Total Thyroidectomy   [3]  Family History  Problem Relation Name Age of Onset   • Breast cancer Mother     • Accidental death Father     • Other (CVA) Brother     [4]  Current Outpatient Medications   Medication Sig Dispense Refill   • acetaminophen (Tylenol) 500 mg tablet Take 1 tablet (500 mg) by mouth every 6 hours if needed for mild pain (1 - 3).     • amLODIPine (Norvasc) 5 mg tablet Take 1 tablet (5 mg) by mouth once daily. 90 tablet 3   • ammonium lactate (Amlactin) 12 % cream Apply topically if needed.     • brimonidine (AlphaGAN) 0.2 % ophthalmic solution Administer 1 drop into both eyes 2 times a day. 30 mL 1   • carbidopa-levodopa (Sinemet)  mg tablet TAKE 1 TABLET BY MOUTH THREE TIMES A  tablet 1   • cholecalciferol (Vitamin D-3) 125 MCG (5000 UT) capsule Take 1 capsule (125 mcg) by mouth once daily.     • dorzolamide-timoloL (Cosopt) 22.3-6.8 mg/mL ophthalmic solution Administer 1 drop into both eyes 2 times a day. 30 mL 0   • DULoxetine (Cymbalta) 30 mg DR capsule TAKE 1 CAPSULE (30 MG) BY MOUTH ONCE DAILY. DO NOT CRUSH OR CHEW. 90 capsule 1   • levothyroxine  (Synthroid, Levoxyl) 75 mcg tablet TAKE 1 TABLET BY MOUTH EVERY DAY TAKE 6 DAYS PER WEEK ONLY 77 tablet 2   • omeprazole OTC (PriLOSEC OTC) 20 mg EC tablet Take 1 tablet (20 mg) by mouth once daily in the morning. Take before meals. Do not crush, chew, or split.     • traMADol (Ultram) 50 mg tablet Take 1 tablet (50 mg) by mouth 3 times a day. 90 tablet 0   • triamcinolone (Kenalog) 0.1 % cream if needed.     • valsartan (Diovan) 80 mg tablet Take 1 tablet (80 mg) by mouth once daily. 90 tablet 3   • hydroCHLOROthiazide (HYDRODiuril) 25 mg tablet Take 1 tablet (25 mg) by mouth early in the morning.. 90 tablet 1     No current facility-administered medications for this visit.

## 2025-05-08 NOTE — PATIENT INSTRUCTIONS
Thank you for allowing me to care for you today. Below is what we discussed and additional information you should be aware of before our next visit. If you have any questions, please don't hesitate to call the office.    Follow-up in 6 months     Weight loss concern: Drink 3 Ensures each day. Increase protein intake: chicken, salmon, nuts, seeds, cottage cheese, yogurt, cheese   your healthcare team if you have questions     ©[facility], 2022

## 2025-05-19 PROBLEM — F01.B0: Status: ACTIVE | Noted: 2022-12-06

## 2025-05-19 PROBLEM — M79.10 SORE MUSCLES: Status: ACTIVE | Noted: 2025-05-19

## 2025-05-27 ENCOUNTER — APPOINTMENT (OUTPATIENT)
Dept: OPHTHALMOLOGY | Facility: CLINIC | Age: 89
End: 2025-05-27
Payer: MEDICARE

## 2025-06-30 ENCOUNTER — TELEPHONE (OUTPATIENT)
Dept: RHEUMATOLOGY | Facility: CLINIC | Age: 89
End: 2025-06-30
Payer: MEDICARE

## 2025-07-07 ENCOUNTER — APPOINTMENT (OUTPATIENT)
Dept: RHEUMATOLOGY | Facility: CLINIC | Age: 89
End: 2025-07-07
Payer: MEDICARE

## 2025-07-13 NOTE — PROGRESS NOTES
Subjective   Patient ID: Lewis Rose is a 88 y.o. female who presents for Osteoarthritis and Pain (FUV-Taking Tramadol 50 PRN and Extra Strength Tylenol PRN. Tox screen up to date. CSA due today. Also, would like to discuss Lyrica.).    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      ammonium lactate (Amlactin) 12 % cream Apply topically if needed.      aspirin 81 mg EC tablet Take 1 tablet (81 mg) by mouth once daily.      brimonidine (AlphaGAN) 0.2 % ophthalmic solution Administer 1 drop into both eyes 2 times a day. 30 mL 0    carbidopa-levodopa (Sinemet)  mg tablet Take 1 tablet by mouth 3 times a day.      DULoxetine (Cymbalta) 30 mg DR capsule Take 1 capsule (30 mg) by mouth once daily. Do not crush or chew.      hydroCHLOROthiazide (HYDRODiuril) 25 mg tablet TAKE 1 TABLET BY MOUTH EVERY DAY 90 tablet 1    levothyroxine (Synthroid, Levoxyl) 75 mcg tablet TAKE 1 TABLET BY MOUTH EVERY DAY TAKE 6 DAYS PER WEEK ONLY 90 tablet 1    omeprazole OTC (PriLOSEC OTC) 20 mg EC tablet Take 1 tablet (20 mg) by mouth once daily in the morning. Take before meals. Do not crush, chew, or split.      timolol (Timoptic) 0.25 % ophthalmic solution INSTILL 1 DROP IN BOTH EYES TWICE DAILY 10 mL 0    valsartan (Diovan) 80 mg tablet TAKE 1 TABLET BY MOUTH ONCE DAILY. 30 tablet 5    pregabalin (Lyrica) 150 mg capsule Take 1 capsule (150 mg) by mouth once daily. 90 capsule 1     [START ON 1/11/2024] traMADol (Ultram) 50 mg tablet Take 1 tablet (50 mg) by mouth 3 times a day as needed for moderate pain (4 - 6). Do not start before January 11, 2024. 90 tablet 1    triamcinolone (Kenalog) 0.1 % cream        No current facility-administered medications for this visit.         has a past medical history of Age-related nuclear cataract, left eye (04/04/2016), Age-related nuclear cataract, right eye (04/04/2016), Anxiety, Arthritis, Conjunctival cysts, left eye (10/02/2015), Cortical age-related cataract, left eye (10/02/2015), Cortical age-related cataract, right eye (10/08/2015), Diabetes mellitus (CMS/[facility]), Disease of thyroid gland, Dry eye syndrome of bilateral lacrimal glands, Encounter for general adult medical examination without abnormal findings (09/26/2017), Encounter for other procedures for purposes other than remedying health state (10/13/2015), Glaucoma, Meibomian gland dysfunction right eye, upper and lower eyelids (08/30/2022), Other conditions influencing health status, Other specified anxiety disorders (05/20/2021), Pain in unspecified hip (07/10/2020), Pain in unspecified hip (07/02/2019), Pain in unspecified knee (06/09/2021), Parkinson's disease, Personal history of malignant melanoma of skin (04/04/2016), Personal history of other diseases of the musculoskeletal system and connective tissue (06/03/2018), Personal history of other diseases of the nervous system and sense organs, Personal history of other diseases of urinary system, Personal history of other drug therapy (08/19/2021), Personal history of other endocrine, nutritional and metabolic disease (05/20/2021), Personal history of other endocrine, nutritional and metabolic disease (04/04/2016), Personal history of other infectious and parasitic diseases (05/05/2017), Personal history of other mental and behavioral disorders (04/04/2016), Persons encountering health services in other specified circumstances (05/20/2021),  Preglaucoma, unspecified, 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   Past Surgical History:   Procedure Laterality Date    APPENDECTOMY  04/04/2016    Appendectomy    BELT ABDOMINOPLASTY  04/04/2016    Abdominoplasty    CATARACT EXTRACTION  04/04/2016    Cataract Surgery    OTHER SURGICAL HISTORY  07/01/2022    Knee replacement    TOTAL THYROIDECTOMY  04/04/2016    Thyroid Surgery Total Thyroidectomy      Social History     Tobacco Use    Smoking status: Never    Smokeless tobacco: Never   Substance Use Topics    Alcohol use: Not Currently    Drug use: Never      family history includes Accidental death in her father; Breast cancer in her mother; CVA in her brother.  Pain Management Panel  More data exists         Latest Ref Rng & Units 8/31/2023 9/7/2022   Pain Management Panel   Amphetamine Screen, Urine NEGATIVE PRESUMPTIVE NEGATIVE  PRESUMPTIVE NEGATIVE    Barbiturate Screen, Urine NEGATIVE PRESUMPTIVE NEGATIVE  PRESUMPTIVE NEGATIVE    Codeine IgE Cutoff <50 ng/mL <50  <50    Hydromorphone Urine Cutoff <25 ng/mL <25  <25    Morphine  Cutoff <50 ng/mL <50  <50         Vitals:    12/11/23 1342   BP: 150/86   Pulse: 97     Lab Results   Component Value Date    TSH 1.35 08/22/2023       PHYSICAL EXAM      NODES   HEART  LUNGS  ABDOMEN   VASCULAR  NEURO   SKIN  JOINTS joint are oa only and no synovitis     PLAN  Diagnoses and all orders for this visit:  Generalized osteoarthritis of multiple sites  -     Drug Screen, Urine With Reflex to Confirmation; Future  -     pregabalin (Lyrica) 150 mg capsule; Take 1 capsule (150 mg) by mouth once daily.  -     traMADol (Ultram) 50 mg tablet; Take 1 tablet (50 mg) by mouth 3 times a day as needed for moderate pain (4  - 6). Do not start before January 11, 2024.  Arthralgia, unspecified joint  -     Drug Screen, Urine With Reflex to Confirmation; Future    Routine follow-up for her chronic pain which includes generalized osteoarthritis and degenerative disc disease  Her Parkinson's is stable and her meds have been decreased because of potential side effect    She will continue with her tramadol her urine tox screen is up-to-date as of today 4-7-2025  Contract signed today  Her OARRS was reviewed and is okay    No need for Lyrica   I will see her back in 3 months-virtual         Patient was identified as a fall risk. Risk prevention instructions provided.

## 2025-07-14 ENCOUNTER — APPOINTMENT (OUTPATIENT)
Dept: RHEUMATOLOGY | Facility: CLINIC | Age: 89
End: 2025-07-14
Payer: MEDICARE

## 2025-07-14 VITALS
OXYGEN SATURATION: 98 % | DIASTOLIC BLOOD PRESSURE: 82 MMHG | BODY MASS INDEX: 17.97 KG/M2 | WEIGHT: 92 LBS | SYSTOLIC BLOOD PRESSURE: 157 MMHG | HEART RATE: 83 BPM

## 2025-07-14 DIAGNOSIS — Z96.652 PRESENCE OF LEFT ARTIFICIAL KNEE JOINT: ICD-10-CM

## 2025-07-14 DIAGNOSIS — M15.9 GENERALIZED OSTEOARTHRITIS OF MULTIPLE SITES: ICD-10-CM

## 2025-07-14 DIAGNOSIS — Z79.899 MEDICATION MANAGEMENT: Primary | ICD-10-CM

## 2025-07-14 DIAGNOSIS — M54.50 CHRONIC LOW BACK PAIN, UNSPECIFIED BACK PAIN LATERALITY, UNSPECIFIED WHETHER SCIATICA PRESENT: ICD-10-CM

## 2025-07-14 DIAGNOSIS — G89.29 CHRONIC LOW BACK PAIN, UNSPECIFIED BACK PAIN LATERALITY, UNSPECIFIED WHETHER SCIATICA PRESENT: ICD-10-CM

## 2025-07-14 DIAGNOSIS — G20.A2 PARKINSON'S DISEASE WITH FLUCTUATING MANIFESTATIONS, UNSPECIFIED WHETHER DYSKINESIA PRESENT: ICD-10-CM

## 2025-07-14 DIAGNOSIS — C43.9 MALIGNANT MELANOMA OF SKIN, UNSPECIFIED: ICD-10-CM

## 2025-07-14 PROCEDURE — 1159F MED LIST DOCD IN RCRD: CPT | Performed by: INTERNAL MEDICINE

## 2025-07-14 PROCEDURE — 3077F SYST BP >= 140 MM HG: CPT | Performed by: INTERNAL MEDICINE

## 2025-07-14 PROCEDURE — 1125F AMNT PAIN NOTED PAIN PRSNT: CPT | Performed by: INTERNAL MEDICINE

## 2025-07-14 PROCEDURE — 3079F DIAST BP 80-89 MM HG: CPT | Performed by: INTERNAL MEDICINE

## 2025-07-14 PROCEDURE — 1036F TOBACCO NON-USER: CPT | Performed by: INTERNAL MEDICINE

## 2025-07-14 PROCEDURE — 99214 OFFICE O/P EST MOD 30 MIN: CPT | Performed by: INTERNAL MEDICINE

## 2025-07-14 RX ORDER — TRAMADOL HYDROCHLORIDE 50 MG/1
50 TABLET, FILM COATED ORAL 3 TIMES DAILY
Qty: 90 TABLET | Refills: 2 | Status: SHIPPED | OUTPATIENT
Start: 2025-07-14

## 2025-07-14 ASSESSMENT — PAIN SCALES - GENERAL: PAINLEVEL_OUTOF10: 5

## 2025-07-21 DIAGNOSIS — G20.A1 PARKINSON'S DISEASE, UNSPECIFIED WHETHER DYSKINESIA PRESENT, UNSPECIFIED WHETHER MANIFESTATIONS FLUCTUATE: ICD-10-CM

## 2025-07-21 DIAGNOSIS — H40.1121 PRIMARY OPEN ANGLE GLAUCOMA OF LEFT EYE, MILD STAGE: ICD-10-CM

## 2025-07-21 DIAGNOSIS — H40.1112 PRIMARY OPEN ANGLE GLAUCOMA OF RIGHT EYE, MODERATE STAGE: ICD-10-CM

## 2025-07-21 DIAGNOSIS — I10 BENIGN ESSENTIAL HYPERTENSION: ICD-10-CM

## 2025-07-21 RX ORDER — DORZOLAMIDE HYDROCHLORIDE AND TIMOLOL MALEATE 20; 5 MG/ML; MG/ML
1 SOLUTION/ DROPS OPHTHALMIC 2 TIMES DAILY
Qty: 10 ML | Refills: 0 | Status: SHIPPED | OUTPATIENT
Start: 2025-07-21

## 2025-07-22 DIAGNOSIS — I10 BENIGN ESSENTIAL HYPERTENSION: ICD-10-CM

## 2025-07-22 RX ORDER — CARBIDOPA AND LEVODOPA 25; 100 MG/1; MG/1
1 TABLET ORAL 3 TIMES DAILY
Qty: 270 TABLET | Refills: 1 | Status: SHIPPED | OUTPATIENT
Start: 2025-07-22

## 2025-07-23 DIAGNOSIS — I10 BENIGN ESSENTIAL HYPERTENSION: ICD-10-CM

## 2025-07-23 RX ORDER — HYDROCHLOROTHIAZIDE 25 MG/1
25 TABLET ORAL
Qty: 90 TABLET | Refills: 3 | Status: SHIPPED | OUTPATIENT
Start: 2025-07-23 | End: 2026-07-18

## 2025-07-23 RX ORDER — HYDROCHLOROTHIAZIDE 25 MG/1
TABLET ORAL
Qty: 90 TABLET | Refills: 1 | OUTPATIENT
Start: 2025-07-23

## 2025-07-23 RX ORDER — HYDROCHLOROTHIAZIDE 25 MG/1
25 TABLET ORAL
Qty: 90 TABLET | Refills: 1 | OUTPATIENT
Start: 2025-07-23 | End: 2026-01-19

## 2025-07-26 ASSESSMENT — SLIT LAMP EXAM - LIDS
COMMENTS: GOOD POSITION, MILD MGD
COMMENTS: GOOD POSITION, MILD MGD

## 2025-07-26 ASSESSMENT — EXTERNAL EXAM - RIGHT EYE: OD_EXAM: NORMAL

## 2025-07-26 ASSESSMENT — EXTERNAL EXAM - LEFT EYE: OS_EXAM: NORMAL

## 2025-07-26 ASSESSMENT — CUP TO DISC RATIO
OD_RATIO: .65
OS_RATIO: .5

## 2025-07-26 NOTE — PROGRESS NOTES
Primary open angle glaucoma of right eye, moderate [id number]  Primary open angle glaucoma of left eye, mild [id number]  -(+)FH glaucoma - brother  -Pachymetry - 500/497  -OCT RNFL (11/26/24) - OD: Thin S/I. OS: Bord T. Stable from 1/30/24, possible mild progression OD compared to 8/30/22.    -HVF 24-2 (11/26/24) - OD: 5/16FL 3%FP 2%FN. Scattered, possible inf/sup arcuate. OS: 1/15FL. Scattered. Stable compared to 9/3/19.   -Plan: Disc appearance stable. Reports good compliance with drops. Patient with mild increase in IOP s/p steroid injection in back on 3/20/21 (dexamethasone, last injection was methylprednisolone 2/20/23). Per patient, she is no longer getting steroid injections.  -Patient was on Timolol 0.25% OU BID. Added Brimonidine 0.2% OU BID (3/21/23). IOP higher than ideal with mild progression on OCT RNFL. Would recommend continuing Brimonidine OU BID. Stopped Timolol (1/30/24), and started dorzolamide-timolol (1/30/24) OU BID. IOP improved. Previously discussed eye drop administration device to help with squeezing bottle due to arthritis. History of putting brimonidine bottle in hot water to make it easier to squeeze, recommended not doing this due to risk of altering medication/contamination, etc.   -IOP and testing stable.   -F/u 6 months for IOP check and OCT RNFL.    Diabetes [id number]  -HbA1c= 6.4 (2/21/25). No diabetic retinopathy seen on last dilated exam. Continue close monitoring of blood glucose, blood pressure, and cholesterol. Plan for annual dilated eye exam.    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 Monitor.     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   -Refraction performed today for diagnostic purposes only and without specific intent to dispense Rx. Glasses Rx not dispensed today.    PRN    DuunwaV06.10  [id number]  Presbyopia  -Continue OTC reading glasses.   -Refraction performed today for diagnostic purposes only and without specific intent to dispense Rx. Glasses Rx not dispensed today.

## 2025-07-29 ENCOUNTER — APPOINTMENT (OUTPATIENT)
Dept: OPHTHALMOLOGY | Facility: CLINIC | Age: 89
End: 2025-07-29
Payer: MEDICARE

## 2025-07-29 DIAGNOSIS — H40.1121 PRIMARY OPEN ANGLE GLAUCOMA OF LEFT EYE, MILD STAGE: ICD-10-CM

## 2025-07-29 DIAGNOSIS — E11.9 DIABETES MELLITUS WITHOUT COMPLICATION: ICD-10-CM

## 2025-07-29 DIAGNOSIS — H52.4 PRESBYOPIA: ICD-10-CM

## 2025-07-29 DIAGNOSIS — H52.201 ASTIGMATISM OF RIGHT EYE, UNSPECIFIED TYPE: ICD-10-CM

## 2025-07-29 DIAGNOSIS — Z96.1 PSEUDOPHAKIA: ICD-10-CM

## 2025-07-29 DIAGNOSIS — H52.13 MYOPIA OF BOTH EYES: ICD-10-CM

## 2025-07-29 DIAGNOSIS — H02.884 MEIBOMIAN GLAND DYSFUNCTION (MGD) OF UPPER EYELIDS OF BOTH EYES: ICD-10-CM

## 2025-07-29 DIAGNOSIS — H40.1112 PRIMARY OPEN ANGLE GLAUCOMA OF RIGHT EYE, MODERATE STAGE: Primary | ICD-10-CM

## 2025-07-29 DIAGNOSIS — H02.881 MEIBOMIAN GLAND DYSFUNCTION (MGD) OF UPPER EYELIDS OF BOTH EYES: ICD-10-CM

## 2025-07-29 PROCEDURE — 92133 CPTRZD OPH DX IMG PST SGM ON: CPT | Performed by: OPHTHALMOLOGY

## 2025-07-29 PROCEDURE — 99213 OFFICE O/P EST LOW 20 MIN: CPT | Performed by: OPHTHALMOLOGY

## 2025-07-29 PROCEDURE — G2211 COMPLEX E/M VISIT ADD ON: HCPCS | Performed by: OPHTHALMOLOGY

## 2025-07-29 RX ORDER — DORZOLAMIDE HYDROCHLORIDE AND TIMOLOL MALEATE 20; 5 MG/ML; MG/ML
1 SOLUTION/ DROPS OPHTHALMIC 2 TIMES DAILY
Qty: 30 ML | Refills: 1 | Status: SHIPPED | OUTPATIENT
Start: 2025-07-29

## 2025-07-29 RX ORDER — BRIMONIDINE TARTRATE 2 MG/ML
1 SOLUTION/ DROPS OPHTHALMIC 2 TIMES DAILY
Qty: 30 ML | Refills: 1 | Status: SHIPPED | OUTPATIENT
Start: 2025-07-29

## 2025-07-29 ASSESSMENT — ENCOUNTER SYMPTOMS
MUSCULOSKELETAL NEGATIVE: 0
CARDIOVASCULAR NEGATIVE: 0
NEUROLOGICAL NEGATIVE: 0
CONSTITUTIONAL NEGATIVE: 0
EYES NEGATIVE: 1
ENDOCRINE NEGATIVE: 0
PSYCHIATRIC NEGATIVE: 0
RESPIRATORY NEGATIVE: 0
HEMATOLOGIC/LYMPHATIC NEGATIVE: 0
GASTROINTESTINAL NEGATIVE: 0
ALLERGIC/IMMUNOLOGIC NEGATIVE: 0

## 2025-07-29 ASSESSMENT — REFRACTION_MANIFEST
OS_SPHERE: -0.25
OD_SPHERE: -0.50
OD_ADD: +2.50
OD_CYLINDER: -0.75
OS_ADD: +2.50
OS_CYLINDER: SPHERE
OD_AXIS: 050

## 2025-07-29 ASSESSMENT — VISUAL ACUITY
OS_SC: 20/40
OD_SC: 20/50
METHOD: SNELLEN - LINEAR

## 2025-07-29 ASSESSMENT — TONOMETRY
IOP_METHOD: GOLDMANN APPLANATION
OD_IOP_MMHG: 12
OS_IOP_MMHG: 12

## 2025-07-29 ASSESSMENT — REFRACTION_WEARINGRX
OS_SPHERE: OTC NVO
OD_SPHERE: OTC NVO

## 2025-08-25 DIAGNOSIS — Z79.899 ON LONG TERM DRUG THERAPY: Primary | ICD-10-CM

## 2025-08-25 DIAGNOSIS — M54.40 CHRONIC LOW BACK PAIN WITH SCIATICA, SCIATICA LATERALITY UNSPECIFIED, UNSPECIFIED BACK PAIN LATERALITY: ICD-10-CM

## 2025-08-25 DIAGNOSIS — G89.29 CHRONIC LOW BACK PAIN WITH SCIATICA, SCIATICA LATERALITY UNSPECIFIED, UNSPECIFIED BACK PAIN LATERALITY: ICD-10-CM

## 2025-08-25 RX ORDER — DULOXETIN HYDROCHLORIDE 30 MG/1
30 CAPSULE, DELAYED RELEASE ORAL DAILY
Qty: 90 CAPSULE | Refills: 1 | Status: SHIPPED | OUTPATIENT
Start: 2025-08-25

## 2025-10-15 ENCOUNTER — APPOINTMENT (OUTPATIENT)
Dept: RHEUMATOLOGY | Facility: CLINIC | Age: 89
End: 2025-10-15
Payer: MEDICARE

## 2025-10-30 ENCOUNTER — APPOINTMENT (OUTPATIENT)
Dept: OPHTHALMOLOGY | Facility: CLINIC | Age: 89
End: 2025-10-30
Payer: MEDICARE

## 2026-03-03 ENCOUNTER — APPOINTMENT (OUTPATIENT)
Dept: OPHTHALMOLOGY | Facility: CLINIC | Age: 89
End: 2026-03-03
Payer: MEDICARE